# Patient Record
Sex: MALE | Race: WHITE | NOT HISPANIC OR LATINO | Employment: FULL TIME | URBAN - METROPOLITAN AREA
[De-identification: names, ages, dates, MRNs, and addresses within clinical notes are randomized per-mention and may not be internally consistent; named-entity substitution may affect disease eponyms.]

---

## 2020-07-28 PROCEDURE — U0003 INFECTIOUS AGENT DETECTION BY NUCLEIC ACID (DNA OR RNA); SEVERE ACUTE RESPIRATORY SYNDROME CORONAVIRUS 2 (SARS-COV-2) (CORONAVIRUS DISEASE [COVID-19]), AMPLIFIED PROBE TECHNIQUE, MAKING USE OF HIGH THROUGHPUT TECHNOLOGIES AS DESCRIBED BY CMS-2020-01-R: HCPCS | Performed by: PHYSICIAN ASSISTANT

## 2020-07-30 ENCOUNTER — TELEPHONE (OUTPATIENT)
Dept: URGENT CARE | Facility: CLINIC | Age: 50
End: 2020-07-30

## 2020-08-12 ENCOUNTER — OFFICE VISIT (OUTPATIENT)
Dept: INTERNAL MEDICINE | Facility: CLINIC | Age: 50
End: 2020-08-12

## 2020-08-12 VITALS
OXYGEN SATURATION: 98 % | BODY MASS INDEX: 28.92 KG/M2 | RESPIRATION RATE: 18 BRPM | WEIGHT: 202 LBS | HEIGHT: 70 IN | HEART RATE: 76 BPM | DIASTOLIC BLOOD PRESSURE: 98 MMHG | TEMPERATURE: 98 F | SYSTOLIC BLOOD PRESSURE: 170 MMHG

## 2020-08-12 DIAGNOSIS — M54.50 ACUTE RIGHT-SIDED LOW BACK PAIN WITHOUT SCIATICA: Primary | ICD-10-CM

## 2020-08-12 DIAGNOSIS — I10 ESSENTIAL HYPERTENSION: ICD-10-CM

## 2020-08-12 DIAGNOSIS — F40.298 NEEDLE PHOBIA: ICD-10-CM

## 2020-08-12 PROCEDURE — 99214 OFFICE O/P EST MOD 30 MIN: CPT | Performed by: NURSE PRACTITIONER

## 2020-08-12 RX ORDER — CYCLOBENZAPRINE HCL 5 MG
5 TABLET ORAL 3 TIMES DAILY PRN
Qty: 15 TABLET | Refills: 0 | Status: SHIPPED | OUTPATIENT
Start: 2020-08-12 | End: 2020-09-09

## 2020-08-12 RX ORDER — PREDNISONE 10 MG/1
10 TABLET ORAL DAILY
Qty: 5 TABLET | Refills: 0 | Status: SHIPPED | OUTPATIENT
Start: 2020-08-12 | End: 2020-09-09

## 2020-08-12 RX ORDER — LISINOPRIL 20 MG/1
20 TABLET ORAL DAILY
Qty: 30 TABLET | Refills: 1 | Status: SHIPPED | OUTPATIENT
Start: 2020-08-12 | End: 2020-10-02

## 2020-08-12 NOTE — PROGRESS NOTES
Subjective   Denny Hutchins is a 50 y.o. male here to establish care.  Chief Complaint   Patient presents with   • [facility] on 7/28   • Hypertension   • Back Pain     lower right back pain pain started this morning.  Had another episode last week       Hypertension   This is a new problem. The current episode started 1 to 4 weeks ago. The problem is unchanged. The problem is uncontrolled. Pertinent negatives include no anxiety, blurred vision, chest pain, headaches, malaise/fatigue, neck pain, orthopnea, palpitations, peripheral edema, PND, shortness of breath or sweats. Agents associated with hypertension include NSAIDs. Past treatments include nothing. Current antihypertension treatment includes nothing. The current treatment provides no improvement. Compliance problems include exercise and diet.  There is no history of angina, CAD/MI, CVA or heart failure. Identifiable causes of hypertension include a hypertension causing med.   Back Pain   This is a new problem. The current episode started today. The problem occurs constantly. The problem has been gradually worsening since onset. The pain is present in the lumbar spine. The quality of the pain is described as aching and cramping. The pain does not radiate. The pain is at a severity of 8/10. The pain is the same all the time. The symptoms are aggravated by bending, sitting and twisting. Stiffness is present all day. Pertinent negatives include no abdominal pain, bladder incontinence, bowel incontinence, chest pain, fever, headaches, leg pain, numbness, paresis, pelvic pain, perianal numbness, tingling, weakness or weight loss. Risk factors include lack of exercise and poor posture. He has tried NSAIDs for the symptoms. The treatment provided mild relief.      He has had no recent labs or work-up from primary care provider.  He is reluctant to have these done as he is afraid of needles.  He is not fasting today.  He has had no evaluation of his lipids in  the past.        The following portions of the patient's history were reviewed and updated as appropriate: allergies, current medications, past family history, past medical history, past social history, past surgical history and problem list.    Review of Systems   Constitutional: Negative for fatigue, fever, malaise/fatigue and unexpected weight loss.   HENT: Negative.    Eyes: Negative for blurred vision, pain and visual disturbance.   Respiratory: Negative for cough, chest tightness and shortness of breath.    Cardiovascular: Negative for chest pain, palpitations, orthopnea, leg swelling and PND.   Gastrointestinal: Negative for abdominal pain, bowel incontinence, constipation and diarrhea.   Genitourinary: Negative.  Negative for difficulty urinating, pelvic pain and urinary incontinence.   Musculoskeletal: Positive for back pain. Negative for arthralgias, myalgias and neck pain.   Skin: Negative for color change and rash.   Neurological: Negative for dizziness, tingling, weakness, light-headedness, numbness, headache and confusion.   Hematological: Does not bruise/bleed easily.           No Known Allergies  History reviewed. No pertinent past medical history.  Past Surgical History:   Procedure Laterality Date   • NO PAST SURGERIES       Family History   Problem Relation Age of Onset   • Diabetes Mother      Social History     Socioeconomic History   • Marital status: Single     Spouse name: Not on file   • Number of children: Not on file   • Years of education: Not on file   • Highest education level: Not on file   Occupational History   • Occupation: carpet installing   Social Needs   • Financial resource strain: Not hard at all   • Food insecurity:     Worry: Never true     Inability: Never true   • Transportation needs:     Medical: No     Non-medical: No   Tobacco Use   • Smoking status: Current Every Day Smoker     Packs/day: 1.00     Types: Cigarettes   • Smokeless tobacco: Never Used   Substance and  "Sexual Activity   • Alcohol use: Yes     Alcohol/week: 18.0 standard drinks     Types: 18 Cans of beer per week     Frequency: 4 or more times a week     Binge frequency: Weekly   • Drug use: Never   • Sexual activity: Yes   Lifestyle   • Physical activity:     Days per week: 4 days     Minutes per session: Not on file   • Stress: Only a little       There is no immunization history on file for this patient.    Current Outpatient Medications:   •  cyclobenzaprine (FLEXERIL) 5 MG tablet, Take 1 tablet by mouth 3 (Three) Times a Day As Needed for Muscle Spasms., Disp: 15 tablet, Rfl: 0  •  lisinopril (PRINIVIL,ZESTRIL) 20 MG tablet, Take 1 tablet by mouth Daily., Disp: 30 tablet, Rfl: 1  •  predniSONE (DELTASONE) 10 MG tablet, Take 1 tablet by mouth Daily., Disp: 5 tablet, Rfl: 0    Objective   Blood pressure 170/98, pulse 76, temperature 98 °F (36.7 °C), resp. rate 18, height 176.8 cm (69.6\"), weight 91.6 kg (202 lb), SpO2 98 %.  Physical Exam   Constitutional: He is oriented to person, place, and time. He appears well-developed and well-nourished. No distress.   HENT:   Head: Normocephalic and atraumatic.   Eyes: Pupils are equal, round, and reactive to light. Conjunctivae are normal.   Neck: Normal range of motion. No JVD present. No thyroid mass and no thyromegaly present.   Cardiovascular: Normal rate, regular rhythm, normal heart sounds and intact distal pulses.   No murmur heard.  Pulmonary/Chest: Effort normal and breath sounds normal. No respiratory distress. He exhibits no tenderness.   Abdominal: Soft. Normal appearance and bowel sounds are normal. He exhibits no distension. There is no tenderness.   Musculoskeletal: He exhibits no edema.        Lumbar back: He exhibits decreased range of motion, tenderness, pain and spasm. He exhibits no bony tenderness, no swelling, no edema, no deformity, no laceration and normal pulse.   Neurological: He is alert and oriented to person, place, and time. He has normal " strength and normal reflexes. No cranial nerve deficit or sensory deficit.   Skin: Skin is warm and dry. He is not diaphoretic. No erythema. No pallor.   Psychiatric: He has a normal mood and affect. His speech is normal and behavior is normal. Judgment and thought content normal. His affect is not angry and not inappropriate. Cognition and memory are normal. He does not exhibit a depressed mood. He is attentive.   Nursing note and vitals reviewed.      Assessment/Plan   Diagnoses and all orders for this visit:    1. Acute right-sided low back pain without sciatica (Primary)  -     predniSONE (DELTASONE) 10 MG tablet; Take 1 tablet by mouth Daily.  Dispense: 5 tablet; Refill: 0  -     cyclobenzaprine (FLEXERIL) 5 MG tablet; Take 1 tablet by mouth 3 (Three) Times a Day As Needed for Muscle Spasms.  Dispense: 15 tablet; Refill: 0  We will do a short course of prednisone for his low back pain.  Can also use Flexeril as needed.  Discussed adverse effects of both medications.  He may want reserve the Flexeril for nighttime use only have advised on no driving or operating heavy machinery while taking.  Encouraged gentle back stretches.  Could benefit from physical therapy if pain persists.  Advised to stop taking NSAIDs.  Can supplement with over-the-counter Tylenol if needed.    2. Essential hypertension  -     CBC (No Diff); Future  -     Comprehensive Metabolic Panel; Future  -     TSH Rfx On Abnormal To Free T4; Future  -     Lipid Panel; Future  -     lisinopril (PRINIVIL,ZESTRIL) 20 MG tablet; Take 1 tablet by mouth Daily.  Dispense: 30 tablet; Refill: 1    Significant elevation in blood pressure.  Patient is asymptomatic.  We will get him started on lisinopril.  Adverse effects and dosing discussed.  We will also check labs as above to evaluate for underlying causes.    3.  Needle phobia  Patient very reluctant to get his labs done as he reports fear of needles.  We will go ahead and order all of his labs to get  these done at the same time.  He will return to get these done 1 day next week when he is fasting.         Return in about 4 weeks (around 9/9/2020) for Recheck., needs labs and BP check next week. .  Plan of care discussed with pt. They verbalized understanding and agreement.       * Please note that portions of this note were completed with a voice recognition program. Efforts were made to edit the dictation but occasionally words are erroneously transcribed.

## 2020-08-17 PROBLEM — I10 ESSENTIAL HYPERTENSION: Status: ACTIVE | Noted: 2020-08-17

## 2020-08-17 PROBLEM — F40.298 NEEDLE PHOBIA: Status: ACTIVE | Noted: 2020-08-17

## 2020-08-17 PROBLEM — M54.50 ACUTE RIGHT-SIDED LOW BACK PAIN WITHOUT SCIATICA: Status: ACTIVE | Noted: 2020-08-17

## 2020-08-21 ENCOUNTER — LAB (OUTPATIENT)
Dept: LAB | Facility: HOSPITAL | Age: 50
End: 2020-08-21

## 2020-08-21 DIAGNOSIS — I10 ESSENTIAL HYPERTENSION: ICD-10-CM

## 2020-08-21 LAB
ALBUMIN SERPL-MCNC: 4.5 G/DL (ref 3.5–5.2)
ALBUMIN/GLOB SERPL: 1.6 G/DL
ALP SERPL-CCNC: 52 U/L (ref 39–117)
ALT SERPL W P-5'-P-CCNC: 29 U/L (ref 1–41)
ANION GAP SERPL CALCULATED.3IONS-SCNC: 12.6 MMOL/L (ref 5–15)
AST SERPL-CCNC: 21 U/L (ref 1–40)
BILIRUB SERPL-MCNC: 0.4 MG/DL (ref 0–1.2)
BUN SERPL-MCNC: 15 MG/DL (ref 6–20)
BUN/CREAT SERPL: 13.3 (ref 7–25)
CALCIUM SPEC-SCNC: 9.2 MG/DL (ref 8.6–10.5)
CHLORIDE SERPL-SCNC: 95 MMOL/L (ref 98–107)
CHOLEST SERPL-MCNC: 173 MG/DL (ref 0–200)
CO2 SERPL-SCNC: 23.4 MMOL/L (ref 22–29)
CREAT SERPL-MCNC: 1.13 MG/DL (ref 0.76–1.27)
DEPRECATED RDW RBC AUTO: 43.5 FL (ref 37–54)
ERYTHROCYTE [DISTWIDTH] IN BLOOD BY AUTOMATED COUNT: 11.9 % (ref 12.3–15.4)
GFR SERPL CREATININE-BSD FRML MDRD: 69 ML/MIN/1.73
GLOBULIN UR ELPH-MCNC: 2.8 GM/DL
GLUCOSE SERPL-MCNC: 94 MG/DL (ref 65–99)
HCT VFR BLD AUTO: 44.5 % (ref 37.5–51)
HDLC SERPL-MCNC: 49 MG/DL (ref 40–60)
HGB BLD-MCNC: 15.2 G/DL (ref 13–17.7)
LDLC SERPL CALC-MCNC: 107 MG/DL (ref 0–100)
LDLC/HDLC SERPL: 2.19 {RATIO}
MCH RBC QN AUTO: 33.4 PG (ref 26.6–33)
MCHC RBC AUTO-ENTMCNC: 34.2 G/DL (ref 31.5–35.7)
MCV RBC AUTO: 97.8 FL (ref 79–97)
PLATELET # BLD AUTO: 220 10*3/MM3 (ref 140–450)
PMV BLD AUTO: 9.3 FL (ref 6–12)
POTASSIUM SERPL-SCNC: 5.2 MMOL/L (ref 3.5–5.2)
PROT SERPL-MCNC: 7.3 G/DL (ref 6–8.5)
RBC # BLD AUTO: 4.55 10*6/MM3 (ref 4.14–5.8)
SODIUM SERPL-SCNC: 131 MMOL/L (ref 136–145)
TRIGL SERPL-MCNC: 84 MG/DL (ref 0–150)
TSH SERPL DL<=0.05 MIU/L-ACNC: 1.34 UIU/ML (ref 0.27–4.2)
VLDLC SERPL-MCNC: 16.8 MG/DL (ref 5–40)
WBC # BLD AUTO: 5.95 10*3/MM3 (ref 3.4–10.8)

## 2020-08-21 PROCEDURE — 80061 LIPID PANEL: CPT

## 2020-08-21 PROCEDURE — 85027 COMPLETE CBC AUTOMATED: CPT

## 2020-08-21 PROCEDURE — 80053 COMPREHEN METABOLIC PANEL: CPT

## 2020-08-21 PROCEDURE — 84443 ASSAY THYROID STIM HORMONE: CPT

## 2020-08-25 DIAGNOSIS — E87.1 HYPONATREMIA: Primary | ICD-10-CM

## 2020-08-26 ENCOUNTER — TELEPHONE (OUTPATIENT)
Dept: INTERNAL MEDICINE | Facility: CLINIC | Age: 50
End: 2020-08-26

## 2020-08-26 NOTE — TELEPHONE ENCOUNTER
----- Message from JW Pack sent at 8/25/2020  6:21 PM EDT -----  Please let him know labs showed a low sodium level. This eliot to be rechecked. Orders are in if he can stop by this week

## 2020-08-27 ENCOUNTER — LAB (OUTPATIENT)
Dept: LAB | Facility: HOSPITAL | Age: 50
End: 2020-08-27

## 2020-08-27 DIAGNOSIS — E87.1 HYPONATREMIA: ICD-10-CM

## 2020-08-27 LAB
ANION GAP SERPL CALCULATED.3IONS-SCNC: 9.4 MMOL/L (ref 5–15)
BUN SERPL-MCNC: 16 MG/DL (ref 6–20)
BUN/CREAT SERPL: 15.2 (ref 7–25)
CALCIUM SPEC-SCNC: 9.5 MG/DL (ref 8.6–10.5)
CHLORIDE SERPL-SCNC: 100 MMOL/L (ref 98–107)
CO2 SERPL-SCNC: 24.6 MMOL/L (ref 22–29)
CREAT SERPL-MCNC: 1.05 MG/DL (ref 0.76–1.27)
GFR SERPL CREATININE-BSD FRML MDRD: 75 ML/MIN/1.73
GLUCOSE SERPL-MCNC: 100 MG/DL (ref 65–99)
OSMOLALITY SERPL: 292 MOSM/KG (ref 275–300)
POTASSIUM SERPL-SCNC: 4.9 MMOL/L (ref 3.5–5.2)
SODIUM SERPL-SCNC: 134 MMOL/L (ref 136–145)

## 2020-08-27 PROCEDURE — 80048 BASIC METABOLIC PNL TOTAL CA: CPT

## 2020-08-27 PROCEDURE — 36415 COLL VENOUS BLD VENIPUNCTURE: CPT

## 2020-08-27 PROCEDURE — 83930 ASSAY OF BLOOD OSMOLALITY: CPT

## 2020-09-08 ENCOUNTER — TELEPHONE (OUTPATIENT)
Dept: INTERNAL MEDICINE | Facility: CLINIC | Age: 50
End: 2020-09-08

## 2020-09-08 NOTE — TELEPHONE ENCOUNTER
----- Message from JW Pack sent at 9/7/2020  3:46 PM EDT -----  Please let him know that his labs look better on the low sodium.  No interventions at this time

## 2020-09-09 ENCOUNTER — OFFICE VISIT (OUTPATIENT)
Dept: INTERNAL MEDICINE | Facility: CLINIC | Age: 50
End: 2020-09-09

## 2020-09-09 VITALS
RESPIRATION RATE: 16 BRPM | HEIGHT: 70 IN | HEART RATE: 79 BPM | DIASTOLIC BLOOD PRESSURE: 90 MMHG | SYSTOLIC BLOOD PRESSURE: 148 MMHG | BODY MASS INDEX: 28.77 KG/M2 | WEIGHT: 201 LBS | TEMPERATURE: 97.8 F | OXYGEN SATURATION: 97 %

## 2020-09-09 DIAGNOSIS — I10 ESSENTIAL HYPERTENSION: Primary | ICD-10-CM

## 2020-09-09 DIAGNOSIS — E87.1 HYPONATREMIA: ICD-10-CM

## 2020-09-09 DIAGNOSIS — Z91.14 NON COMPLIANCE W MEDICATION REGIMEN: ICD-10-CM

## 2020-09-09 DIAGNOSIS — M54.50 ACUTE RIGHT-SIDED LOW BACK PAIN WITHOUT SCIATICA: ICD-10-CM

## 2020-09-09 PROCEDURE — 99213 OFFICE O/P EST LOW 20 MIN: CPT | Performed by: NURSE PRACTITIONER

## 2020-09-09 RX ORDER — CYCLOBENZAPRINE HCL 5 MG
5 TABLET ORAL 3 TIMES DAILY PRN
Qty: 30 TABLET | Refills: 0 | Status: SHIPPED | OUTPATIENT
Start: 2020-09-09 | End: 2020-12-02

## 2020-09-09 NOTE — PROGRESS NOTES
Subjective   Denny Hutchins is a 50 y.o. male.     Chief Complaint   Patient presents with   • Hypertension   • Back Pain     much better       History of Present Illness     Hypertension-this is been a problem in the last month.  At his last visit on 8/12/2020 we started him on lisinopril.  Has not been checking BP at home. Has cuff. Has been forgetting to take his BP medication regularly. Did not take yet today.   Denies headaches, dizziness, visual disturbances, chest pain, dyspnea, TIA, leg pain/claudication symptoms, and edema.       Back pain-at his last visit he was experiencing some right-sided low back pain.  He was treated with prednisone and cyclobenzaprine and has had significant improvement in the pain. He used all of the flexeril and would like to have them on hand for PRN use since he tends to have intermittent back pain.     Hyponatremia noted on labs last visit.  Repeat sodium was better.   Lab Results   Component Value Date    GLUCOSE 100 (H) 08/27/2020    CALCIUM 9.5 08/27/2020     (L) 08/27/2020    K 4.9 08/27/2020    CO2 24.6 08/27/2020     08/27/2020    BUN 16 08/27/2020    CREATININE 1.05 08/27/2020    EGFRIFNONA 75 08/27/2020    BCR 15.2 08/27/2020    ANIONGAP 9.4 08/27/2020           The following portions of the patient's history were reviewed and updated as appropriate: allergies, current medications, past family history, past medical history, past social history, past surgical history and problem list.        Review of Systems   Constitutional: Negative for fatigue, fever and unexpected weight loss.   HENT: Negative.    Eyes: Negative for pain and visual disturbance.   Respiratory: Negative for cough, chest tightness and shortness of breath.    Cardiovascular: Negative for chest pain, palpitations and leg swelling.   Gastrointestinal: Negative for abdominal pain, constipation and diarrhea.   Genitourinary: Negative.  Negative for difficulty urinating.   Musculoskeletal: Positive  "for back pain. Negative for arthralgias and myalgias.   Skin: Negative for color change and rash.   Neurological: Negative for dizziness, weakness, light-headedness, headache and confusion.   Hematological: Does not bruise/bleed easily.           Outpatient Medications Marked as Taking for the 9/9/20 encounter (Office Visit) with Bere Dowling APRN   Medication Sig Dispense Refill   • lisinopril (PRINIVIL,ZESTRIL) 20 MG tablet Take 1 tablet by mouth Daily. 30 tablet 1     No Known Allergies        Objective   Physical Exam   Constitutional: He is oriented to person, place, and time. He appears well-developed and well-nourished. No distress.   HENT:   Head: Normocephalic and atraumatic.   Eyes: Pupils are equal, round, and reactive to light. Conjunctivae are normal.   Neck: Normal range of motion. No JVD present.   Cardiovascular: Normal rate, regular rhythm, normal heart sounds and intact distal pulses.   No murmur heard.  Pulmonary/Chest: Effort normal and breath sounds normal. No respiratory distress. He exhibits no tenderness.   Abdominal: Soft. Normal appearance and bowel sounds are normal. He exhibits no distension. There is no tenderness.   Musculoskeletal: He exhibits no edema.   Neurological: He is alert and oriented to person, place, and time.   Skin: Skin is warm and dry. He is not diaphoretic. No erythema. No pallor.   Psychiatric: He has a normal mood and affect. His speech is normal and behavior is normal. Judgment and thought content normal.   Nursing note and vitals reviewed.      Vitals:    09/09/20 0802   BP: 148/90   Pulse: 79   Resp: 16   Temp: 97.8 °F (36.6 °C)   SpO2: 97%   Weight: 91.2 kg (201 lb)   Height: 176.8 cm (69.6\")     Body mass index is 29.17 kg/m².  Wt Readings from Last 3 Encounters:   09/09/20 91.2 kg (201 lb)   08/12/20 91.6 kg (202 lb)   10/21/19 88.5 kg (195 lb)             Assessment/Plan       Diagnoses and all orders for this visit:    1. Essential hypertension " (Primary)  BP still up, he has not had meds yet. He will go straight home and take the lisinopril.   Patient to monitor BP at home, instructed on goal BP <130/80, patient will let me know if not meeting goal.   Nurse BP check in 1-2 weeks and can adjust lisinopril at that time if needed, bring log of BP with him    2. Acute right-sided low back pain without sciatica  Comments:  better  Orders:  -     cyclobenzaprine (FLEXERIL) 5 MG tablet; Take 1 tablet by mouth 3 (Three) Times a Day As Needed for Muscle Spasms.  Dispense: 30 tablet; Refill: 0    3. Hyponatremia  Comments:  improved on recheck       4. Non compliance w medication regimen  Comments:  having trouble remembering to take lisinopril every day  Encouraged to put bottle near a daily activity such as brushing teeth or coffee pot or set an alert/reminder on smart phone         Return in about 3 months (around 12/9/2020) for Annual, and in 2 weeks for nurse BP check.    I discussed my findings,recommendations, and plan of care was with the patient. They verbalized understanding and agreement.  Patient was encouraged to keep me informed of any acute changes, lack of improvement, or any new concerning symptoms.       * Please note that portions of this note were completed with a voice recognition program. Efforts were made to edit the dictation but occasionally words are erroneously transcribed.

## 2020-10-02 DIAGNOSIS — I10 ESSENTIAL HYPERTENSION: ICD-10-CM

## 2020-10-02 RX ORDER — LISINOPRIL 20 MG/1
TABLET ORAL
Qty: 30 TABLET | Refills: 2 | Status: SHIPPED | OUTPATIENT
Start: 2020-10-02 | End: 2020-12-02

## 2020-10-02 NOTE — TELEPHONE ENCOUNTER
Refills sent, Please remind him to stop in for the nurse BP check or at least let us know recent  BP readings on the med

## 2020-12-02 ENCOUNTER — OFFICE VISIT (OUTPATIENT)
Dept: INTERNAL MEDICINE | Facility: CLINIC | Age: 50
End: 2020-12-02

## 2020-12-02 ENCOUNTER — RESULTS ENCOUNTER (OUTPATIENT)
Dept: INTERNAL MEDICINE | Facility: CLINIC | Age: 50
End: 2020-12-02

## 2020-12-02 VITALS
DIASTOLIC BLOOD PRESSURE: 88 MMHG | HEART RATE: 84 BPM | OXYGEN SATURATION: 99 % | TEMPERATURE: 96.8 F | BODY MASS INDEX: 29.92 KG/M2 | HEIGHT: 69 IN | SYSTOLIC BLOOD PRESSURE: 140 MMHG | RESPIRATION RATE: 18 BRPM | WEIGHT: 202 LBS

## 2020-12-02 DIAGNOSIS — Z12.11 COLON CANCER SCREENING: ICD-10-CM

## 2020-12-02 DIAGNOSIS — Z28.21 INFLUENZA VACCINATION DECLINED: ICD-10-CM

## 2020-12-02 DIAGNOSIS — I10 ESSENTIAL HYPERTENSION: ICD-10-CM

## 2020-12-02 DIAGNOSIS — Z72.0 TOBACCO ABUSE: ICD-10-CM

## 2020-12-02 DIAGNOSIS — Z78.9 ALCOHOL USE: ICD-10-CM

## 2020-12-02 DIAGNOSIS — Z11.59 ENCOUNTER FOR HEPATITIS C SCREENING TEST FOR LOW RISK PATIENT: ICD-10-CM

## 2020-12-02 DIAGNOSIS — Z13.31 NEGATIVE DEPRESSION SCREENING: ICD-10-CM

## 2020-12-02 DIAGNOSIS — Z23 NEED FOR PNEUMOCOCCAL VACCINATION: ICD-10-CM

## 2020-12-02 DIAGNOSIS — Z00.00 ANNUAL PHYSICAL EXAM: Primary | ICD-10-CM

## 2020-12-02 PROCEDURE — 90732 PPSV23 VACC 2 YRS+ SUBQ/IM: CPT | Performed by: NURSE PRACTITIONER

## 2020-12-02 PROCEDURE — 99396 PREV VISIT EST AGE 40-64: CPT | Performed by: NURSE PRACTITIONER

## 2020-12-02 PROCEDURE — 90471 IMMUNIZATION ADMIN: CPT | Performed by: NURSE PRACTITIONER

## 2020-12-02 RX ORDER — LISINOPRIL 40 MG/1
40 TABLET ORAL DAILY
Qty: 30 TABLET | Refills: 3 | Status: SHIPPED | OUTPATIENT
Start: 2020-12-02 | End: 2021-03-08 | Stop reason: DRUGHIGH

## 2020-12-02 NOTE — PROGRESS NOTES
Patient Care Team:  Bere Dowling APRN as PCP - General (Nurse Practitioner)  Bere Dowling APRN as Nurse Practitioner (Nurse Practitioner)     Chief complaint: Patient is in today for a physical          Patient is a 50 y.o. male who presents for his yearly physical exam.     HPI      Hypertension-chronic.  Uncontrolled.  He is currently lisinopril 20 mg.  Reports he is taking it most the time but he did forget yesterday.  He follows unhealthy diet and does not exercise routinely.  He does drink about 18 beers a  week,sometimes on holidays he will have more.  Does not feel as though he has an alcohol problem.  CAGE Questionnaire:  Have you ever felt you should Cut down on your drinking?  No   Have people Annoyed you by criticizing your drinking?  No  Have you ever felt bad or Guilty about your drinking?  No  Have you ever had a drink first thing in the morning to steady your nerves or to get rid of a hangover (Eye opener)?  No  SCORE 0/4    Health maintenance/lifestyle:  Immunization History   Administered Date(s) Administered   • Pneumococcal Polysaccharide (PPSV23) 12/02/2020       Pneumococcal: Due, will update today  Influenza: Refused  Tdap: Unknown, he refused  Hep A: Declined  Hep C screening: Never    Colon cancer screening: He has no history of colon cancer, no familial history of colon cancer, denies any melena, hematochezia, or changes in bowel pattern or consistency.  He would like to do Cologuard.    *    Diet: eats out rarely, limits junk foods, not much fruits and veggies.   Exercise: Rare    Social History     Tobacco Use   Smoking Status Current Every Day Smoker   • Packs/day: 1.00   • Types: Cigarettes   Smokeless Tobacco Never Used     Social History     Substance and Sexual Activity   Alcohol Use Yes   • Alcohol/week: 18.0 standard drinks   • Types: 18 Cans of beer per week   • Frequency: 4 or more times a week   • Binge frequency: Weekly       PHQ-2 Depression Screening  Little  interest or pleasure in doing things? 0   Feeling down, depressed, or hopeless? 0   PHQ-2 Total Score 0         Review of Systems   Constitutional: Negative for activity change, appetite change, chills, diaphoresis, fatigue, fever, unexpected weight gain and unexpected weight loss.   HENT: Negative for voice change.    Eyes: Negative for blurred vision, double vision, pain and visual disturbance.   Respiratory: Negative for cough, chest tightness, shortness of breath and wheezing.    Cardiovascular: Negative for chest pain, palpitations and leg swelling.   Gastrointestinal: Negative for abdominal distention, abdominal pain, constipation, diarrhea, nausea and vomiting.   Endocrine: Negative for cold intolerance, heat intolerance, polydipsia, polyphagia and polyuria.   Genitourinary: Negative for difficulty urinating, frequency and urgency.   Musculoskeletal: Positive for arthralgias. Negative for gait problem and myalgias.   Skin: Negative for color change, dry skin and rash.   Neurological: Negative for dizziness, facial asymmetry, weakness, light-headedness, numbness, headache and confusion.   Hematological: Negative for adenopathy. Does not bruise/bleed easily.   Psychiatric/Behavioral: Negative for decreased concentration and sleep disturbance. The patient is not nervous/anxious.    All other systems reviewed and are negative.        History  History reviewed. No pertinent past medical history.   Past Surgical History:   Procedure Laterality Date   • NO PAST SURGERIES        No Known Allergies   Family History   Problem Relation Age of Onset   • Diabetes Mother      Social History     Socioeconomic History   • Marital status: Single     Spouse name: Not on file   • Number of children: Not on file   • Years of education: Not on file   • Highest education level: Not on file   Occupational History   • Occupation: carpet installing   Social Needs   • Financial resource strain: Not hard at all   • Food insecurity      "Worry: Never true     Inability: Never true   • Transportation needs     Medical: No     Non-medical: No   Tobacco Use   • Smoking status: Current Every Day Smoker     Packs/day: 1.00     Types: Cigarettes   • Smokeless tobacco: Never Used   Substance and Sexual Activity   • Alcohol use: Yes     Alcohol/week: 18.0 standard drinks     Types: 18 Cans of beer per week     Frequency: 4 or more times a week     Binge frequency: Weekly   • Drug use: Never   • Sexual activity: Yes   Lifestyle   • Physical activity     Days per week: 4 days     Minutes per session: Not on file   • Stress: Only a little        Current Outpatient Medications:   •  lisinopril (PRINIVIL,ZESTRIL) 40 MG tablet, Take 1 tablet by mouth Daily., Disp: 30 tablet, Rfl: 3                /88   Pulse 84   Temp 96.8 °F (36 °C)   Resp 18   Ht 175.8 cm (69.2\")   Wt 91.6 kg (202 lb)   SpO2 99%   BMI 29.66 kg/m²       Physical Exam  Vitals signs and nursing note reviewed.   Constitutional:       General: He is not in acute distress.     Appearance: Normal appearance. He is well-developed. He is not diaphoretic.   HENT:      Head: Normocephalic and atraumatic.      Right Ear: External ear normal.      Left Ear: External ear normal.      Nose: Nose normal.   Eyes:      General: No scleral icterus.        Right eye: No discharge.         Left eye: No discharge.      Conjunctiva/sclera: Conjunctivae normal.      Pupils: Pupils are equal, round, and reactive to light.   Neck:      Musculoskeletal: Normal range of motion and neck supple.      Thyroid: No thyroid mass, thyromegaly or thyroid tenderness.      Vascular: Normal carotid pulses. No carotid bruit or JVD.      Trachea: No tracheal deviation.   Cardiovascular:      Rate and Rhythm: Normal rate and regular rhythm.      Heart sounds: No murmur. No friction rub. No gallop.    Pulmonary:      Effort: Pulmonary effort is normal. No respiratory distress.      Breath sounds: Normal breath sounds. No " wheezing or rales.   Chest:      Chest wall: No tenderness.   Abdominal:      General: Bowel sounds are normal. There is no distension.      Palpations: Abdomen is soft. There is no mass.      Tenderness: There is no abdominal tenderness. There is no guarding or rebound.      Hernia: No hernia is present.   Genitourinary:     Comments: deferred  Musculoskeletal: Normal range of motion.         General: No tenderness or deformity.   Lymphadenopathy:      Cervical: No cervical adenopathy.   Skin:     General: Skin is warm and dry.      Coloration: Skin is not pale.      Findings: No erythema or rash.   Neurological:      Mental Status: He is alert and oriented to person, place, and time.      Motor: No abnormal muscle tone.      Deep Tendon Reflexes: Reflexes are normal and symmetric. Reflexes normal.   Psychiatric:         Behavior: Behavior normal.         Thought Content: Thought content normal.         Judgment: Judgment normal.                   Diagnoses and all orders for this visit:    1. Annual physical exam (Primary)    2. Essential hypertension  -     lisinopril (PRINIVIL,ZESTRIL) 40 MG tablet; Take 1 tablet by mouth Daily.  Dispense: 30 tablet; Refill: 3    3. Influenza vaccination declined    4. Negative depression screening    5. Tobacco abuse    6. Alcohol use    7. Colon cancer screening  -     Cologuard - Stool, Per Rectum; Future    8. Need for pneumococcal vaccination  -     Pneumococcal Polysaccharide Vaccine 23-Valent Greater Than or Equal To 1yo Subcutaneous / IM    9. Encounter for hepatitis C screening test for low risk patient  -     HCV Antibody Rfx To Qnt PCR; Future    Blood pressure is uncontrolled.  We will increase his lisinopril to 40 mg daily.  Patient to monitor BP at home, instructed on goal BP <130/80, patient will let me know if not meeting goal.   Encouraged low-sodium diet.    Encouraged to decrease alcohol consumption      Patient has had labs in August.  He declines any  additional labs today.  He is due for hepatitis C screening.  I will order this so that we can collect with his next set of labs.  Immunizations and screenings he declines flu vaccination and tetanus vaccination.  Willing to do pneumococcal vaccination.  Cologuard ordered.  Counseling: The patient is advised to quit smoking but he is not ready to do so at this time, begin progressive daily aerobic exercise program Follow up: Return in about 3 months (around 3/2/2021) for chronic condition follow up.  Plan of care discussed with pt. They verbalized understanding and agreement.     Bere Dowling, JW   12/2/2020   09:00 EST              * Please note that portions of this note were completed with a voice recognition program. Efforts were made to edit the dictation but occasionally words are erroneously transcribed.

## 2021-03-08 ENCOUNTER — OFFICE VISIT (OUTPATIENT)
Dept: INTERNAL MEDICINE | Facility: CLINIC | Age: 51
End: 2021-03-08

## 2021-03-08 VITALS
RESPIRATION RATE: 18 BRPM | TEMPERATURE: 97.5 F | HEART RATE: 65 BPM | HEIGHT: 69 IN | OXYGEN SATURATION: 97 % | BODY MASS INDEX: 30.66 KG/M2 | DIASTOLIC BLOOD PRESSURE: 74 MMHG | WEIGHT: 207 LBS | SYSTOLIC BLOOD PRESSURE: 122 MMHG

## 2021-03-08 DIAGNOSIS — I48.91 NEW ONSET A-FIB (HCC): ICD-10-CM

## 2021-03-08 DIAGNOSIS — I21.4 NSTEMI (NON-ST ELEVATED MYOCARDIAL INFARCTION) (HCC): ICD-10-CM

## 2021-03-08 DIAGNOSIS — Z72.0 TOBACCO ABUSE: ICD-10-CM

## 2021-03-08 DIAGNOSIS — I10 ESSENTIAL HYPERTENSION: Primary | Chronic | ICD-10-CM

## 2021-03-08 DIAGNOSIS — Z09 HOSPITAL DISCHARGE FOLLOW-UP: ICD-10-CM

## 2021-03-08 PROBLEM — R07.9 CHEST PAIN: Status: ACTIVE | Noted: 2021-02-25

## 2021-03-08 PROBLEM — R07.9 CHEST PAIN: Status: RESOLVED | Noted: 2021-02-25 | Resolved: 2021-03-08

## 2021-03-08 PROBLEM — Z91.89 AT RISK OF DISEASE: Status: ACTIVE | Noted: 2021-03-08

## 2021-03-08 PROCEDURE — 99214 OFFICE O/P EST MOD 30 MIN: CPT | Performed by: NURSE PRACTITIONER

## 2021-03-08 RX ORDER — TICAGRELOR 90 MG/1
1 TABLET ORAL 2 TIMES DAILY
COMMUNITY
Start: 2021-02-25 | End: 2021-05-07 | Stop reason: SDUPTHER

## 2021-03-08 RX ORDER — LISINOPRIL 20 MG/1
1 TABLET ORAL DAILY
COMMUNITY
Start: 2021-02-25 | End: 2021-03-29 | Stop reason: SDUPTHER

## 2021-03-08 RX ORDER — NICOTINE 21 MG/24HR
PATCH, TRANSDERMAL 24 HOURS TRANSDERMAL
COMMUNITY
Start: 2021-02-26 | End: 2021-05-07

## 2021-03-08 RX ORDER — METOPROLOL TARTRATE 50 MG/1
25 TABLET, FILM COATED ORAL 2 TIMES DAILY
COMMUNITY
Start: 2021-02-25 | End: 2021-03-29 | Stop reason: SDUPTHER

## 2021-03-08 RX ORDER — AMIODARONE HYDROCHLORIDE 200 MG/1
1 TABLET ORAL DAILY
COMMUNITY
Start: 2021-02-25 | End: 2021-04-05 | Stop reason: ALTCHOICE

## 2021-03-08 RX ORDER — APIXABAN 5 MG/1
1 TABLET, FILM COATED ORAL 2 TIMES DAILY
COMMUNITY
Start: 2021-02-25 | End: 2021-04-05 | Stop reason: ALTCHOICE

## 2021-03-08 RX ORDER — ATORVASTATIN CALCIUM 80 MG/1
80 TABLET, FILM COATED ORAL DAILY
COMMUNITY
Start: 2021-02-25 | End: 2021-03-29 | Stop reason: SDUPTHER

## 2021-03-08 NOTE — ASSESSMENT & PLAN NOTE
Coronary artery disease is newly identified.  Continue current treatment regimen.  Dietary sodium restriction.  Weight loss.  Regular aerobic exercise.  Stop smoking.  Refer to cardiology as Dr. Michaels does not take his insurance  Cardiac status will be reassessed in 1 month.

## 2021-03-08 NOTE — PROGRESS NOTES
"Chief Complaint  heart attack ( hospital follow)    Subjective     {CC  Problem List  Visit Diagnosis   Encounters  Notes  Medications  Labs  Result Review Imaging  Media :23}     Denny Hutchins presents to [facility] for   History of Present Illness    Pt here for hospital follow up. He was in [facility]   2/22/21-2/25/2021 for MI. He reports he was working and he started having pain/cramping in his neck and jaw with some nausea.   He never had any CP.  He actually went to the emergency department as he thought he might have food poisoning.    He was noted to have elevated troponin and was transferred over to [facility].  EKG showed no acute changes, troponin went from 1.4 up to 10.  D-dimer was negative. Was started on heparin drip and loaded on aspirin and Brilinta.  He was on IV nitroglycerin   he did have LHC with drug eluting stent placed.      Cardiology there recommend amiodarone, aspirin, Brilinta, and Eliquis as he also had new onset atrial fibrillation during his hospitalization.  He was started on Lopressor as well.  His echo showed mild left ventricular hypertrophy with an estimated EF of 50%  He has known HTN. Is on lisinopril. Reports they decreased the dose of lisinopril to 20 mg while he was there. He does not recall what medications he is on otherwise.      He has appt to see cardiology 3/25/2021 with Dr. Michaels.  Unfortunately they do not accept his insurance so he will need to arrange continued follow-up with a different cardiologist.    Since being back home her feels \"great.\"   no CP, arm pain, neck pain, nausea, palpations, dyspnea.   He is back to work and doing okay with that.    Current outpatient and discharge medications have been reconciled for the patient.  Reviewed by: JW Pack    Health Maintenance:   Got covid vaccine 3/6/2021     Review of Systems - General ROS: negative for - chills, fatigue, fever, hot flashes, malaise, night " sweats or sleep disturbance  Respiratory ROS: no cough, shortness of breath, or wheezing  Cardiovascular ROS: no chest pain or dyspnea on exertion  Neurological ROS: no TIA or stroke symptoms  negative      No Known Allergies  Current Outpatient Medications on File Prior to Visit   Medication Sig Dispense Refill   • amiodarone (PACERONE) 200 MG tablet Take 1 tablet by mouth Daily.     • atorvastatin (LIPITOR) 80 MG tablet Take 80 mg by mouth Daily.     • Brilinta 90 MG tablet tablet Take 1 tablet by mouth 2 (two) times a day.     • Eliquis 5 MG tablet tablet Take 1 tablet by mouth 2 (two) times a day.     • lisinopril (PRINIVIL,ZESTRIL) 20 MG tablet Take 1 tablet by mouth Daily.     • metoprolol tartrate (LOPRESSOR) 50 MG tablet Take 25 mg by mouth 2 (two) times a day.     • nicotine (NICODERM CQ) 21 MG/24HR patch APPLY ONE PATCH TOPICALLY EVERY 24 HOURS FOR 21 DAYS     • [DISCONTINUED] lisinopril (PRINIVIL,ZESTRIL) 40 MG tablet Take 1 tablet by mouth Daily. 30 tablet 3     No current facility-administered medications on file prior to visit.     History reviewed. No pertinent past medical history.   Past Surgical History:   Procedure Laterality Date   • NO PAST SURGERIES       Social History     Socioeconomic History   • Marital status: Single     Spouse name: Not on file   • Number of children: Not on file   • Years of education: Not on file   • Highest education level: Not on file   Tobacco Use   • Smoking status: Current Every Day Smoker     Packs/day: 0.50     Types: Cigarettes   • Smokeless tobacco: Never Used   Substance and Sexual Activity   • Alcohol use: Yes     Alcohol/week: 18.0 standard drinks     Types: 18 Cans of beer per week   • Drug use: Never   • Sexual activity: Yes     Family History   Problem Relation Age of Onset   • Diabetes Mother          The following portions of the patient's history were reviewed and updated as appropriate: allergies, current medications, past family history, past medical  "history, past social history, past surgical history and problem list and are available for review within electronic record    Objective     Vital Signs:   /74   Pulse 65   Temp 97.5 °F (36.4 °C)   Resp 18   Ht 175.8 cm (69.2\")   Wt 93.9 kg (207 lb)   SpO2 97%   BMI 30.39 kg/m²         Physical Exam  Vitals and nursing note reviewed.   Constitutional:       General: He is not in acute distress.     Appearance: Normal appearance. He is well-developed. He is not diaphoretic.   HENT:      Head: Normocephalic and atraumatic.   Eyes:      Conjunctiva/sclera: Conjunctivae normal.      Pupils: Pupils are equal, round, and reactive to light.   Neck:      Vascular: No JVD.   Cardiovascular:      Rate and Rhythm: Normal rate and regular rhythm.      Chest Wall: PMI is not displaced. No thrill.      Pulses: Normal pulses.      Heart sounds: Normal heart sounds. No murmur.   Pulmonary:      Effort: Pulmonary effort is normal. No respiratory distress.      Breath sounds: Normal breath sounds.   Chest:      Chest wall: No tenderness.   Abdominal:      General: Bowel sounds are normal. There is no distension.      Palpations: Abdomen is soft.      Tenderness: There is no abdominal tenderness.   Musculoskeletal:      Cervical back: Normal range of motion.      Right lower leg: No edema.      Left lower leg: No edema.   Skin:     General: Skin is warm and dry.      Coloration: Skin is not pale.      Findings: No erythema.   Neurological:      Mental Status: He is alert and oriented to person, place, and time.   Psychiatric:         Speech: Speech normal.         Behavior: Behavior normal.         Thought Content: Thought content normal.         Judgment: Judgment normal.          Result Review :     The following data was reviewed by: JW Roberson on 03/08/2021:      Data reviewed: Cardiology studies echo report form [facility] and Recent hospitalization notes 2/22-2/25/2021              Assessment and Plan  "     Diagnoses and all orders for this visit:    1. Essential hypertension (Primary)  Assessment & Plan:  Hypertension is improving with treatment.  Continue current treatment regimen.  Weight loss.  Regular aerobic exercise.  Stop smoking.  Continue current medications.  Blood pressure will be reassessed in 4 weeks.    Orders:  -     Ambulatory Referral to Cardiology    2. NSTEMI (non-ST elevated myocardial infarction) (CMS/[facility])  Assessment & Plan:  Coronary artery disease is newly identified.  Continue current treatment regimen.  Dietary sodium restriction.  Weight loss.  Regular aerobic exercise.  Stop smoking.  Refer to cardiology as Dr. Michaels does not take his insurance  Cardiac status will be reassessed in 1 month.    Orders:  -     Ambulatory Referral to Cardiology    3. New onset a-fib (CMS/[facility])  Assessment & Plan:  Regular rate and rhythm noted in office to auscultation.  Agree with current medications.  Will refer to cardiology for routine follow-up as well since Dr. Michaels does not take his insurance    Orders:  -     Ambulatory Referral to Cardiology    4. Tobacco abuse  Assessment & Plan:  Encourage continued cessation efforts.  He will continue the NicoDerm 21 mg until he sees me again in 1 month      5. Hospital discharge follow-up            Follow Up     Patient was given instructions and counseling regarding his condition or for health maintenance advice. Please see specific information pulled into the AVS if appropriate.   Any new medication's adverse effects have been discussed in detail with patient  Patient was encouraged to keep me informed of any acute changes, lack of improvement, or any new concerning symptoms.    Return in about 4 weeks (around 4/5/2021) for Recheck.    * Please note that portions of this note were completed with a voice recognition program. Efforts were made to edit the dictation but occasionally words are erroneously transcribed.

## 2021-03-08 NOTE — ASSESSMENT & PLAN NOTE
Regular rate and rhythm noted in office to auscultation.  Agree with current medications.  Will refer to cardiology for routine follow-up as well since Dr. Michaels does not take his insurance

## 2021-03-08 NOTE — ASSESSMENT & PLAN NOTE
Encourage continued cessation efforts.  He will continue the NicoDerm 21 mg until he sees me again in 1 month

## 2021-03-08 NOTE — ASSESSMENT & PLAN NOTE
Hypertension is improving with treatment.  Continue current treatment regimen.  Weight loss.  Regular aerobic exercise.  Stop smoking.  Continue current medications.  Blood pressure will be reassessed in 4 weeks.

## 2021-03-29 ENCOUNTER — TELEPHONE (OUTPATIENT)
Dept: INTERNAL MEDICINE | Facility: CLINIC | Age: 51
End: 2021-03-29

## 2021-03-29 RX ORDER — METOPROLOL TARTRATE 50 MG/1
25 TABLET, FILM COATED ORAL 2 TIMES DAILY
Qty: 30 TABLET | Refills: 3 | Status: SHIPPED | OUTPATIENT
Start: 2021-03-29 | End: 2021-07-12 | Stop reason: SDUPTHER

## 2021-03-29 RX ORDER — LISINOPRIL 20 MG/1
20 TABLET ORAL DAILY
Qty: 30 TABLET | Refills: 3 | Status: SHIPPED | OUTPATIENT
Start: 2021-03-29 | End: 2021-07-12 | Stop reason: SDUPTHER

## 2021-03-29 RX ORDER — ATORVASTATIN CALCIUM 80 MG/1
80 TABLET, FILM COATED ORAL DAILY
Qty: 30 TABLET | Refills: 3 | Status: SHIPPED | OUTPATIENT
Start: 2021-03-29 | End: 2021-07-12 | Stop reason: SDUPTHER

## 2021-03-29 NOTE — TELEPHONE ENCOUNTER
Caller: Denny Hutchins    Relationship: Self    Best call back number: [phone]    Medication needed:   Requested Prescriptions     Pending Prescriptions Disp Refills   • lisinopril (PRINIVIL,ZESTRIL) 20 MG tablet       Sig: Take 1 tablet by mouth Daily.   • metoprolol tartrate (LOPRESSOR) 50 MG tablet       Sig: Take 0.5 tablets by mouth 2 (two) times a day.   • atorvastatin (LIPITOR) 80 MG tablet       Sig: Take 1 tablet by mouth Daily.       When do you need the refill by: 3/29/21    Does the patient have less than a 3 day supply:  [x] Yes  [] No    What is the patient's preferred pharmacy: [id number] - [facility] [address] [phone] PH - [phone]      ADDITIONAL REQUEST: PATIENT REQUESTED A PRESCRIPTION FOR ASPIRIN 81 MG TABLETS, PATIENT STATED HE TAKES 1 DAILY

## 2021-03-29 NOTE — TELEPHONE ENCOUNTER
Last appt: 3/8/2021  Next appt: 4/5/2021  Last refill:    Atorvastatin - 2/25/2021   Metoprolol Tatrate - 2/25/2021   Lisinopril - 2/25/2021     I do not believe you initially fill these medications. They were brought to you from a patient.

## 2021-04-05 ENCOUNTER — OFFICE VISIT (OUTPATIENT)
Dept: INTERNAL MEDICINE | Facility: CLINIC | Age: 51
End: 2021-04-05

## 2021-04-05 VITALS
DIASTOLIC BLOOD PRESSURE: 74 MMHG | TEMPERATURE: 97.1 F | HEIGHT: 69 IN | BODY MASS INDEX: 30.07 KG/M2 | WEIGHT: 203 LBS | SYSTOLIC BLOOD PRESSURE: 124 MMHG | OXYGEN SATURATION: 98 % | RESPIRATION RATE: 18 BRPM | HEART RATE: 69 BPM

## 2021-04-05 DIAGNOSIS — I21.4 NSTEMI (NON-ST ELEVATED MYOCARDIAL INFARCTION) (HCC): Chronic | ICD-10-CM

## 2021-04-05 DIAGNOSIS — Z12.11 COLON CANCER SCREENING: ICD-10-CM

## 2021-04-05 DIAGNOSIS — I48.91 NEW ONSET A-FIB (HCC): Chronic | ICD-10-CM

## 2021-04-05 DIAGNOSIS — I10 ESSENTIAL HYPERTENSION: Primary | Chronic | ICD-10-CM

## 2021-04-05 DIAGNOSIS — Z72.0 TOBACCO ABUSE: Chronic | ICD-10-CM

## 2021-04-05 PROBLEM — E78.5 HYPERLIPIDEMIA: Status: ACTIVE | Noted: 2021-03-24

## 2021-04-05 PROBLEM — I48.0 PAROXYSMAL ATRIAL FIBRILLATION (HCC): Status: ACTIVE | Noted: 2021-03-08

## 2021-04-05 PROBLEM — Z98.61 HISTORY OF PERCUTANEOUS CORONARY INTERVENTION: Status: ACTIVE | Noted: 2021-02-24

## 2021-04-05 PROBLEM — E78.5 HYPERLIPIDEMIA: Chronic | Status: ACTIVE | Noted: 2021-03-24

## 2021-04-05 PROBLEM — I48.0 PAROXYSMAL ATRIAL FIBRILLATION: Chronic | Status: ACTIVE | Noted: 2021-03-08

## 2021-04-05 PROCEDURE — 99406 BEHAV CHNG SMOKING 3-10 MIN: CPT | Performed by: NURSE PRACTITIONER

## 2021-04-05 PROCEDURE — 99214 OFFICE O/P EST MOD 30 MIN: CPT | Performed by: NURSE PRACTITIONER

## 2021-04-05 RX ORDER — ASPIRIN 81 MG/1
TABLET ORAL
COMMUNITY

## 2021-04-05 NOTE — ASSESSMENT & PLAN NOTE
Coronary artery disease is stable.  He should continue current treatment regimen.  Encouraged DASH diet and regular physical activity.  Will refer to cardiology since Dr. Michaels does not take his insurance.  Will reassess in 3 months.

## 2021-04-05 NOTE — ASSESSMENT & PLAN NOTE
Regular rate and rhythm noted in office to auscultation.  Agree with current medications.  Will refer to cardiology

## 2021-04-05 NOTE — ASSESSMENT & PLAN NOTE
Lipid abnormalities are unchanged.  Nutritional counseling was provided. and Pharmacotherapy as ordered.  Continue high-dose statin.  We will recheck lipids at follow-up  Lipids will be reassessed in 3 months.

## 2021-04-05 NOTE — PROGRESS NOTES
Chief Complaint  Hypertension and Nicotine Dependence (discuss script for nicotine gum)    Subjective          Denny Hutchins presents to [facility] for   History of Present Illness     Denny Hutchins is a 50 y.o. male who is here today to follow-up on hypertension, hyperlipidemia,  and MI.  He was noted to have an elevated troponin but no EKG changes in February 2021.  He was evaluated at [facility].  He did have a left heart cath with a drug-eluting stent placed.  He is followed by cardiology.  They recommend Lopressor, Amiodarone, aspirin, Brilinta, and Eliquis as he also had a new onset of atrial fibrillation during his hospitalization.  He has seen Dr. Michaels since discharge.  Dr. Michaels DC'd his amiodarone and his Eliquis.  At his last visit I referred him to cardiology since Dr. Michaels does not take his insurance.  This referral has been closed by the scheduling team and indicates that the patient refused.  Patient reports that he has not refused this referral and would like to see cardiology.  Denies headaches, dizziness, visual disturbances, palpitations chest pain, dyspnea, TIA or CVA symptoms, leg pain/claudication symptoms, and edema.   He has been off of his metoprolol for about a week. He has refills at his pharmacy already     Tobacco abuse-at his last visit he was on NicoDerm 21 mg.he does not feel like this has helped.   He is smoking 0.5ppd.   He would like the nicotine gum  Social History     Tobacco Use   Smoking Status Current Every Day Smoker   • Packs/day: 0.50   • Types: Cigarettes   Smokeless Tobacco Never Used        Health Maintenance:   boyd ordered 12/3/2020-he reports that no one contacted him regarding this.  He is willing to complete this  HCV screening ordered 12/3/2020- he has not completed yet. Has a fear of needles and will collect at next lab draw.     No Known Allergies  Current Outpatient Medications on File Prior to Visit   Medication Sig  "Dispense Refill   • atorvastatin (LIPITOR) 80 MG tablet Take 1 tablet by mouth Daily. 30 tablet 3   • Brilinta 90 MG tablet tablet Take 1 tablet by mouth 2 (two) times a day.     • lisinopril (PRINIVIL,ZESTRIL) 20 MG tablet Take 1 tablet by mouth Daily. 30 tablet 3   • metoprolol tartrate (LOPRESSOR) 50 MG tablet Take 0.5 tablets by mouth 2 (two) times a day. 30 tablet 3   • nicotine (NICODERM CQ) 21 MG/24HR patch APPLY ONE PATCH TOPICALLY EVERY 24 HOURS FOR 21 DAYS     • aspirin (aspirin) 81 MG EC tablet aspirin 81 mg tablet,delayed release   Take 1 tablet every day by oral route.     • [DISCONTINUED] amiodarone (PACERONE) 200 MG tablet Take 1 tablet by mouth Daily.     • [DISCONTINUED] Eliquis 5 MG tablet tablet Take 1 tablet by mouth 2 (two) times a day.       No current facility-administered medications on file prior to visit.         The following portions of the patient's history were reviewed and updated as appropriate: allergies, current medications, past family history, past medical history, past social history, past surgical history and problem list and are available for review within electronic record    Objective     Vital Signs:   /74   Pulse 69   Temp 97.1 °F (36.2 °C)   Resp 18   Ht 175.8 cm (69.2\")   Wt 92.1 kg (203 lb)   SpO2 98%   BMI 29.80 kg/m²         Physical Exam  Vitals and nursing note reviewed.   Constitutional:       General: He is not in acute distress.     Appearance: Normal appearance. He is well-developed. He is not diaphoretic.   HENT:      Head: Normocephalic and atraumatic.   Eyes:      Conjunctiva/sclera: Conjunctivae normal.      Pupils: Pupils are equal, round, and reactive to light.   Neck:      Vascular: No JVD.   Cardiovascular:      Rate and Rhythm: Normal rate and regular rhythm.      Chest Wall: PMI is not displaced. No thrill.      Pulses: Normal pulses.      Heart sounds: Normal heart sounds. No murmur heard.     Pulmonary:      Effort: Pulmonary effort is " normal. No respiratory distress.      Breath sounds: Normal breath sounds.   Chest:      Chest wall: No tenderness.   Abdominal:      General: Bowel sounds are normal. There is no distension.      Palpations: Abdomen is soft.      Tenderness: There is no abdominal tenderness.   Musculoskeletal:      Cervical back: Normal range of motion.      Right lower leg: No edema.      Left lower leg: No edema.   Skin:     General: Skin is warm and dry.      Coloration: Skin is not pale.      Findings: No erythema.   Neurological:      Mental Status: He is alert and oriented to person, place, and time.   Psychiatric:         Speech: Speech normal.         Behavior: Behavior normal.         Thought Content: Thought content normal.         Judgment: Judgment normal.          Result Review :                         Assessment and Plan      Diagnoses and all orders for this visit:    1. Essential hypertension (Primary)  Assessment & Plan:  Hypertension is improving with treatment.  We will plan to continue his current treatment regimen.  Will reassess in 3 months.  No refills needed today these were already sent last week.  Patient encouraged to pick them up from pharmacy    Orders:  -     Ambulatory Referral to Cardiology    2. NSTEMI (non-ST elevated myocardial infarction) (CMS/[facility])  Assessment & Plan:  Coronary artery disease is stable.  He should continue current treatment regimen.  Encouraged DASH diet and regular physical activity.  Will refer to cardiology since Dr. Michaels does not take his insurance.  Will reassess in 3 months.    Orders:  -     Ambulatory Referral to Cardiology    3. New onset a-fib (CMS/HCC)  Assessment & Plan:  Regular rate and rhythm noted in office to auscultation.  Agree with current medications.  Will refer to cardiology    Orders:  -     Ambulatory Referral to Cardiology    4. Tobacco abuse  Assessment & Plan:  Denny Hutchins  reports that he has been smoking cigarettes. He has been smoking about 0.50  packs per day. He has never used smokeless tobacco.. I have educated him on the risk of diseases from using tobacco products such as cancer, COPD and heart disease.     I advised him to quit and he is willing to quit. We have discussed the following method/s for tobacco cessation:  Nicorette gum.  Together we have set a quit date for 1 week from today.  He will follow up with me in 3 months or sooner to check on his progress.    I spent 3.5 minutes counseling the patient.           Orders:  -     nicotine polacrilex (NICORETTE) 2 MG gum; Chew 1 each As Needed for Smoking Cessation (may use 1 piece every 1-2 hours. Max of 24 pieces in 24 hours.).  Dispense: 220 each; Refill: 0    5. Colon cancer screening  -     Cologuard - Stool, Per Rectum; Future          Follow Up     Patient was given instructions and counseling regarding his condition or for health maintenance advice. Please see specific information pulled into the AVS if appropriate.   Any new medication's adverse effects have been discussed in detail with patient  Patient was encouraged to keep me informed of any acute changes, lack of improvement, or any new concerning symptoms.    Return in about 3 months (around 7/5/2021) for chronic condition follow up.    * Please note that portions of this note were completed with a voice recognition program. Efforts were made to edit the dictation but occasionally words are erroneously transcribed.

## 2021-04-05 NOTE — ASSESSMENT & PLAN NOTE
Denny Hutchins  reports that he has been smoking cigarettes. He has been smoking about 0.50 packs per day. He has never used smokeless tobacco.. I have educated him on the risk of diseases from using tobacco products such as cancer, COPD and heart disease.     I advised him to quit and he is willing to quit. We have discussed the following method/s for tobacco cessation:  Nicorette gum.  Together we have set a quit date for 1 week from today.  He will follow up with me in 3 months or sooner to check on his progress.    I spent 3.5 minutes counseling the patient.

## 2021-04-05 NOTE — ASSESSMENT & PLAN NOTE
Hypertension is improving with treatment.  We will plan to continue his current treatment regimen.  Will reassess in 3 months.  No refills needed today these were already sent last week.  Patient encouraged to pick them up from pharmacy

## 2021-05-04 RX ORDER — TICAGRELOR 90 MG/1
90 TABLET ORAL 2 TIMES DAILY
Qty: 60 TABLET | OUTPATIENT
Start: 2021-05-04

## 2021-05-06 PROBLEM — M54.50 ACUTE RIGHT-SIDED LOW BACK PAIN WITHOUT SCIATICA: Status: RESOLVED | Noted: 2020-08-17 | Resolved: 2021-05-06

## 2021-05-06 PROBLEM — Z91.89 AT RISK OF DISEASE: Status: RESOLVED | Noted: 2021-03-08 | Resolved: 2021-05-06

## 2021-05-06 PROBLEM — R93.1 ABNORMAL ECHOCARDIOGRAM: Status: ACTIVE | Noted: 2021-05-06

## 2021-05-06 PROBLEM — I25.10 CAD (CORONARY ARTERY DISEASE): Status: ACTIVE | Noted: 2021-03-08

## 2021-05-06 NOTE — PROGRESS NOTES
Subjective   Denny Hutchins is a 50 y.o. male.  Primary Care: Bere Dowling, APRN  Referring: Bere Dowling, APRN  [address]  [address]  [place] [address]      Chief Complaint   Patient presents with   • Atrial Fibrillation     Consult   • Coronary Artery Disease       Patient Active Problem List    Diagnosis    • CAD (coronary artery disease)      · NSTEMI, LHC 2-24-21: Subtotal proximal RCA occlusion. 3.5 x 18 mm [facility] stent  · Echo 2-24-21: Normal LV size.  Mild LVH.  EF 50% with inferobasal akinesia.  Abnormal systolic strain pattern.  Abnormal right atrial size.  Dilated right ventricle with abnormal right ventricular function.  Mildly dilated aortic root 3.89 cm.  Mildly dilated ascending aorta 3.92 cm.  Normal valvular morphology.   • Paroxysmal atrial fibrillation (CMS/HCC)      New onset 2/2021 during hospitalization for MI at [facility].  Started on amiodarone, metoprolol, and Eliquis.   Amiodarone and Eliquis discontinued by Dr. Michaels at follow-up.   • Hyperlipidemia    • Essential hypertension    • Tobacco user      Priority: Low   • Needle phobia       History of Present Illness   Is a 50-year-old hypertensive dyslipidemic male smoker who prior to February took no medications. He presented to [facility] with a complaint of neck pain. He ruled in for non-ST elevation MI and underwent cardiac catheterization and received a stent to the RCA. He had an echocardiogram which showed ejection fraction of 50%, right atrial and ventricular enlargement, mildly dilated aortic root and ascending aorta.  At some point during his hospitalization he had atrial fibrillation.  He was initially treated with amiodarone and started on Eliquis however these were discontinued by [facility] cardiology.  He was discharged to home on the below medications. The cardiologist from [facility] does not take his insurance and he was referred by primary care to our service to establish  primary cardiology follow-up since discharge he has had no complaint of exertional chest pain or dyspnea, denies orthopnea, PND, claudication, lower extremity edema.  He does not check his blood pressure at home.  He is tolerating statin therapy.  He has reduced his smoking from about 1 pack/day to a half a pack per day.  He drinks 4-6 beers approximately 5 days/week.  He ran out of Brilinta approximately 4 days ago which has not been refilled.      Past Surgical History:   Procedure Laterality Date   • NO PAST SURGERIES         The following portions of the patient's history were reviewed and updated as appropriate: allergies, current medications, past family history, past medical history, past social history, past surgical history and problem list.    No Known Allergies      Current Outpatient Medications   Medication Instructions   • aspirin (aspirin) 81 MG EC tablet aspirin 81 mg tablet,delayed release   Take 1 tablet every day by oral route.   • atorvastatin (LIPITOR) 80 mg, Oral, Daily   • Brilinta 90 MG tablet tablet 1 tablet, Oral, 2 times daily, NOT TAKING   • lisinopril (PRINIVIL,ZESTRIL) 20 mg, Oral, Daily   • metoprolol tartrate (LOPRESSOR) 25 mg, Oral, 2 times daily   • nicotine polacrilex (NICORETTE) 2 mg, Mouth/Throat, As Needed         Social History     Socioeconomic History   • Marital status: Single     Spouse name: Not on file   • Number of children: Not on file   • Years of education: Not on file   • Highest education level: Not on file   Tobacco Use   • Smoking status: Current Every Day Smoker     Packs/day: 0.50     Types: Cigarettes   • Smokeless tobacco: Never Used   Substance and Sexual Activity   • Alcohol use: Yes     Alcohol/week: 18.0 standard drinks     Types: 18 Cans of beer per week     Comment: social   • Drug use: Never   • Sexual activity: Yes       Family History   Problem Relation Age of Onset   • Diabetes Mother        Objective      /80 (BP Location: Right arm, Patient  "Position: Sitting)   Pulse 66   Ht 180.3 cm (71\")   Wt 93.4 kg (206 lb)   SpO2 97%   BMI 28.73 kg/m²     Constitutional:       Appearance: Well-developed.   Pulmonary:      Effort: Pulmonary effort is normal. No respiratory distress.      Breath sounds: Normal breath sounds. No wheezing. No rales.      Comments: Bases clear  Chest:      Chest wall: Not tender to palpatation.   Cardiovascular:      Normal rate. Regular rhythm.      Murmurs: There is no murmur.      No gallop. No click. No rub.   Pulses:     Intact distal pulses.   Edema:     Peripheral edema absent.   Musculoskeletal: Normal range of motion.         ECG 12 Lead    Date/Time: 5/6/2021 1:58 PM  Performed by: Gutierrez Headley MD  Authorized by: Gutierrez Headley MD   Previous ECG: no previous ECG available  Rhythm: sinus rhythm  Rate: normal  BPM: 66  Conduction: conduction normal  ST Segments: ST segments normal  T Waves: T waves normal  T inversion: III  QRS axis: normal  Other: no other findings    Clinical impression: normal ECG            Lab Review:   Lab Results   Component Value Date    GLUCOSE 100 (H) 08/27/2020    BUN 16 08/27/2020    CREATININE 1.05 08/27/2020    EGFRIFNONA 75 08/27/2020    BCR 15.2 08/27/2020    CO2 24.6 08/27/2020    CALCIUM 9.5 08/27/2020    ALBUMIN 4.50 08/21/2020    AST 21 08/21/2020    ALT 29 08/21/2020       Lab Results   Component Value Date    WBC 5.95 08/21/2020    HGB 15.2 08/21/2020    HCT 44.5 08/21/2020    MCV 97.8 (H) 08/21/2020     08/21/2020       Lab Results   Component Value Date    TSH 1.340 08/21/2020       CARDIAC LABS:      Lab Results   Component Value Date    CHOL 173 08/21/2020    TRIG 84 08/21/2020    HDL 49 08/21/2020     (H) 08/21/2020         SARS-CoV-2, ALISON   Date Value Ref Range Status   07/28/2020 Not Detected Not Detected Final     Comment:                Result Review:    [x]  Laboratory  []  Radiology  [x]  EKG/Telemetry   []  Pathology  [x]  Old records heart catheterization " report and [facility] from [facility]. Primary care notes.   []  Other:        Assessment:   Diagnosis Plan   1. Coronary artery disease involving native coronary artery of native heart without angina pectoris  no current anginal symptoms, continue current medical regimen.  I have represcribed Brilinta and will give him some samples here in the office today for immediate use.   2. Paroxysmal atrial fibrillation (CMS/[facility])  Holter Monitor - 72 Hour Up To 15 Days   3. Essential hypertension   well managed, continue current medical regimen   4. Mixed hyperlipidemia   tolerating high-dose statin therapy   5. Abnormal echocardiogram   continue risk factor management to include blood pressure management and lipid management with future monitoring.   6. Tobacco user   counseled for less than 3 minutes      Plan:  The diagnosis of coronary artery disease, recent stent and paroxysmal atrial fibrillation discussed with the patient with future plan of care.  We will obtain a cardiac monitor to assess any paroxysmal atrial fibrillation burden.  He was strongly advised to cut back on his alcohol consumption.  Importance of medication compliance specially dual antiplatelet therapy strongly emphasized, we gave him samples and prescription refill for Brilinta which he has not taken for 4 days.  Importance of lipid management and smoking cessation strongly emphasized.    Continue current medications.   Cardiology follow-up in 3 months, sooner as needed.  Thank you for allowing us to participate in the care of your patient.     Scribed for Gutierrez Headley MD by Electronically signed by Electronically signed by ALANNA Ewing, 05/07/21, 9:47 AM EDT.      I, Gutierrez Headley MD, personally performed the services described in this documentation as scribed by the above named individual in my presence, and it is both accurate and complete.  5/7/2021  09:52 EDT

## 2021-05-07 ENCOUNTER — CONSULT (OUTPATIENT)
Dept: CARDIOLOGY | Facility: CLINIC | Age: 51
End: 2021-05-07

## 2021-05-07 VITALS
DIASTOLIC BLOOD PRESSURE: 80 MMHG | WEIGHT: 206 LBS | HEIGHT: 71 IN | BODY MASS INDEX: 28.84 KG/M2 | SYSTOLIC BLOOD PRESSURE: 128 MMHG | HEART RATE: 66 BPM | OXYGEN SATURATION: 97 %

## 2021-05-07 DIAGNOSIS — I48.0 PAROXYSMAL ATRIAL FIBRILLATION (HCC): Chronic | ICD-10-CM

## 2021-05-07 DIAGNOSIS — R93.1 ABNORMAL ECHOCARDIOGRAM: ICD-10-CM

## 2021-05-07 DIAGNOSIS — I10 ESSENTIAL HYPERTENSION: Chronic | ICD-10-CM

## 2021-05-07 DIAGNOSIS — I25.10 CORONARY ARTERY DISEASE INVOLVING NATIVE CORONARY ARTERY OF NATIVE HEART WITHOUT ANGINA PECTORIS: Primary | ICD-10-CM

## 2021-05-07 DIAGNOSIS — Z72.0 TOBACCO USER: Chronic | ICD-10-CM

## 2021-05-07 DIAGNOSIS — E78.2 MIXED HYPERLIPIDEMIA: Chronic | ICD-10-CM

## 2021-05-07 PROCEDURE — 93000 ELECTROCARDIOGRAM COMPLETE: CPT | Performed by: INTERNAL MEDICINE

## 2021-05-07 PROCEDURE — 99243 OFF/OP CNSLTJ NEW/EST LOW 30: CPT | Performed by: INTERNAL MEDICINE

## 2021-05-07 RX ORDER — TICAGRELOR 90 MG/1
90 TABLET ORAL 2 TIMES DAILY
Qty: 60 TABLET | Refills: 11 | Status: SHIPPED | OUTPATIENT
Start: 2021-05-07 | End: 2021-06-08 | Stop reason: SDUPTHER

## 2021-05-10 ENCOUNTER — TELEPHONE (OUTPATIENT)
Dept: INTERNAL MEDICINE | Facility: CLINIC | Age: 51
End: 2021-05-10

## 2021-05-10 NOTE — TELEPHONE ENCOUNTER
Called and spoke to patient, he states that it is located on the bottom right side. It happened this AM. He states that he turned the wrong way and bent over the wrong way and it hurts. He states that he has had this before and that normally he gets steroids and it helps with the overall pain and swelling.

## 2021-05-10 NOTE — TELEPHONE ENCOUNTER
Called and spoke with patient, he is agreeable to having an appointment. Scheduled him for tomorrow 05/11 at 9:45 with Bere Dowling.

## 2021-05-10 NOTE — TELEPHONE ENCOUNTER
Caller: Denny Hutchins    Relationship: Self    Best call back number: [phone]     What medication are you requesting: STERIOD    What are your current symptoms: PULLED BACK MUSCLES    How long have you been experiencing symptoms:     Have you had these symptoms before:    [x] Yes  [] No    Have you been treated for these symptoms before:   [x] Yes  [] No    If a prescription is needed, what is your preferred pharmacy and phone number: [id number] - [place], [place] - [address] [phone] [facility] [phone] FX     Additional notes:

## 2021-05-11 ENCOUNTER — OFFICE VISIT (OUTPATIENT)
Dept: INTERNAL MEDICINE | Facility: CLINIC | Age: 51
End: 2021-05-11

## 2021-05-11 VITALS
BODY MASS INDEX: 28.7 KG/M2 | DIASTOLIC BLOOD PRESSURE: 82 MMHG | SYSTOLIC BLOOD PRESSURE: 116 MMHG | HEIGHT: 71 IN | RESPIRATION RATE: 18 BRPM | HEART RATE: 64 BPM | WEIGHT: 205 LBS | TEMPERATURE: 97.5 F | OXYGEN SATURATION: 98 %

## 2021-05-11 DIAGNOSIS — S39.012A LUMBOSACRAL STRAIN, INITIAL ENCOUNTER: Primary | ICD-10-CM

## 2021-05-11 PROCEDURE — 99213 OFFICE O/P EST LOW 20 MIN: CPT | Performed by: NURSE PRACTITIONER

## 2021-05-11 RX ORDER — CYCLOBENZAPRINE HCL 10 MG
10 TABLET ORAL 3 TIMES DAILY PRN
Qty: 45 TABLET | Refills: 0 | Status: SHIPPED | OUTPATIENT
Start: 2021-05-11 | End: 2021-06-02

## 2021-05-11 NOTE — PATIENT INSTRUCTIONS
Back Exercises  These exercises help to make your trunk and back strong. They also help to keep the lower back flexible. Doing these exercises can help to prevent back pain or lessen existing pain.  · If you have back pain, try to do these exercises 2-3 times each day or as told by your doctor.  · As you get better, do the exercises once each day. Repeat the exercises more often as told by your doctor.  · To stop back pain from coming back, do the exercises once each day, or as told by your doctor.  Exercises  Single knee to chest  Do these steps 3-5 times in a row for each le. Lie on your back on a firm bed or the floor with your legs stretched out.  2. Bring one knee to your chest.  3. Grab your knee or thigh with both hands and hold them it in place.  4. Pull on your knee until you feel a gentle stretch in your lower back or buttocks.  5. Keep doing the stretch for 10-30 seconds.  6. Slowly let go of your leg and straighten it.  Pelvic tilt  Do these steps 5-10 times in a row:  1. Lie on your back on a firm bed or the floor with your legs stretched out.  2. Bend your knees so they point up to the ceiling. Your feet should be flat on the floor.  3. Tighten your lower belly (abdomen) muscles to press your lower back against the floor. This will make your tailbone point up to the ceiling instead of pointing down to your feet or the floor.  4. Stay in this position for 5-10 seconds while you gently tighten your muscles and breathe evenly.  Cat-cow  Do these steps until your lower back bends more easily:  1. Get on your hands and knees on a firm surface. Keep your hands under your shoulders, and keep your knees under your hips. You may put padding under your knees.  2. Let your head hang down toward your chest. Tighten (contract) the muscles in your belly. Point your tailbone toward the floor so your lower back becomes rounded like the back of a cat.  3. Stay in this position for 5 seconds.  4. Slowly lift your  head. Let the muscles of your belly relax. Point your tailbone up toward the ceiling so your back forms a sagging arch like the back of a cow.  5. Stay in this position for 5 seconds.    Press-ups  Do these steps 5-10 times in a row:  1. Lie on your belly (face-down) on the floor.  2. Place your hands near your head, about shoulder-width apart.  3. While you keep your back relaxed and keep your hips on the floor, slowly straighten your arms to raise the top half of your body and lift your shoulders. Do not use your back muscles. You may change where you place your hands in order to make yourself more comfortable.  4. Stay in this position for 5 seconds.  5. Slowly return to lying flat on the floor.    Bridges  Do these steps 10 times in a row:  1. Lie on your back on a firm surface.  2. Bend your knees so they point up to the ceiling. Your feet should be flat on the floor. Your arms should be flat at your sides, next to your body.  3. Tighten your butt muscles and lift your butt off the floor until your waist is almost as high as your knees. If you do not feel the muscles working in your butt and the back of your thighs, slide your feet 1-2 inches farther away from your butt.  4. Stay in this position for 3-5 seconds.  5. Slowly lower your butt to the floor, and let your butt muscles relax.  If this exercise is too easy, try doing it with your arms crossed over your chest.  Belly crunches  Do these steps 5-10 times in a row:  1. Lie on your back on a firm bed or the floor with your legs stretched out.  2. Bend your knees so they point up to the ceiling. Your feet should be flat on the floor.  3. Cross your arms over your chest.  4. Tip your chin a little bit toward your chest but do not bend your neck.  5. Tighten your belly muscles and slowly raise your chest just enough to lift your shoulder blades a tiny bit off of the floor. Avoid raising your body higher than that, because it can put too much stress on your low  back.  6. Slowly lower your chest and your head to the floor.  Back lifts  Do these steps 5-10 times in a row:  1. Lie on your belly (face-down) with your arms at your sides, and rest your forehead on the floor.  2. Tighten the muscles in your legs and your butt.  3. Slowly lift your chest off of the floor while you keep your hips on the floor. Keep the back of your head in line with the curve in your back. Look at the floor while you do this.  4. Stay in this position for 3-5 seconds.  5. Slowly lower your chest and your face to the floor.  Contact a doctor if:  · Your back pain gets a lot worse when you do an exercise.  · Your back pain does not get better 2 hours after you exercise.  If you have any of these problems, stop doing the exercises. Do not do them again unless your doctor says it is okay.  Get help right away if:  · You have sudden, very bad back pain. If this happens, stop doing the exercises. Do not do them again unless your doctor says it is okay.  This information is not intended to replace advice given to you by your health care provider. Make sure you discuss any questions you have with your health care provider.  Document Revised: 09/12/2019 Document Reviewed: 09/12/2019  Elsevier Patient Education © 2021 [employer]

## 2021-05-11 NOTE — PROGRESS NOTES
Chief Complaint  Back Pain (lower right back pain-pulled muscle yesterday.  took tylenol, slept on heating pad)    Subjective          Denny Hutchins presents to [facility] for   Back Pain  This is a new problem. The current episode started yesterday. The problem occurs constantly. The problem has been gradually improving since onset. The pain is present in the lumbar spine. The quality of the pain is described as aching. The pain does not radiate. The pain is at a severity of 7/10. The symptoms are aggravated by bending and twisting. Pertinent negatives include no abdominal pain, bladder incontinence, bowel incontinence, chest pain, dysuria, fever, headaches, leg pain, numbness, paresis, paresthesias, pelvic pain, perianal numbness, tingling, weakness or weight loss. Risk factors include poor posture. He has tried heat (tylenol) for the symptoms. The treatment provided mild relief.   Patient states yesterday morning he bent over and felt pain in his right low back.  He was not lifting anything heavy at the time. Pain is a lot better today.       No Known Allergies  Current Outpatient Medications on File Prior to Visit   Medication Sig Dispense Refill   • aspirin (aspirin) 81 MG EC tablet aspirin 81 mg tablet,delayed release   Take 1 tablet every day by oral route.     • atorvastatin (LIPITOR) 80 MG tablet Take 1 tablet by mouth Daily. 30 tablet 3   • Brilinta 90 MG tablet tablet Take 1 tablet by mouth 2 (two) times a day. 60 tablet 11   • lisinopril (PRINIVIL,ZESTRIL) 20 MG tablet Take 1 tablet by mouth Daily. 30 tablet 3   • metoprolol tartrate (LOPRESSOR) 50 MG tablet Take 0.5 tablets by mouth 2 (two) times a day. 30 tablet 3   • nicotine polacrilex (NICORETTE) 2 MG gum Chew 1 each As Needed for Smoking Cessation (may use 1 piece every 1-2 hours. Max of 24 pieces in 24 hours.). 220 each 0     No current facility-administered medications on file prior to visit.         The  "following portions of the patient's history were reviewed and updated as appropriate: allergies, current medications, past family history, past medical history, past social history, past surgical history and problem list and are available for review within electronic record    Objective     Vital Signs:   /82   Pulse 64   Temp 97.5 °F (36.4 °C)   Resp 18   Ht 180.3 cm (71\")   Wt 93 kg (205 lb)   SpO2 98%   BMI 28.59 kg/m²         Physical Exam  Vitals and nursing note reviewed.   Constitutional:       Appearance: Normal appearance. He is well-developed.   HENT:      Head: Normocephalic and atraumatic.   Eyes:      Conjunctiva/sclera: Conjunctivae normal.      Pupils: Pupils are equal, round, and reactive to light.   Cardiovascular:      Rate and Rhythm: Normal rate and regular rhythm.      Heart sounds: Normal heart sounds.   Pulmonary:      Effort: Pulmonary effort is normal. No respiratory distress.      Breath sounds: Normal breath sounds.   Abdominal:      General: Bowel sounds are normal. There is no distension.      Palpations: Abdomen is soft.      Tenderness: There is no abdominal tenderness.   Musculoskeletal:      Cervical back: Normal range of motion.      Lumbar back: Spasms and tenderness present. No swelling, edema, deformity, signs of trauma, lacerations or bony tenderness. Normal range of motion. Negative right straight leg raise test and negative left straight leg raise test. No scoliosis.      Comments: Normal heel and toe walks.  Normal squat and rise.   Skin:     General: Skin is warm and dry.   Neurological:      Mental Status: He is alert and oriented to person, place, and time.      Cranial Nerves: No cranial nerve deficit.      Sensory: No sensory deficit.      Deep Tendon Reflexes: Reflexes are normal and symmetric.   Psychiatric:         Speech: Speech normal.         Behavior: Behavior normal.         Thought Content: Thought content normal.         Judgment: Judgment normal. "          Result Review :                         Assessment and Plan      Diagnoses and all orders for this visit:    1. Lumbosacral strain, initial encounter (Primary)  -     cyclobenzaprine (FLEXERIL) 10 MG tablet; Take 1 tablet by mouth 3 (Three) Times a Day As Needed for Muscle Spasms.  Dispense: 45 tablet; Refill: 0    He used tylenol and heating pad yesterday which did help somewhat.  We will add on cyclobenzaprine.  Adverse effects discussed.  Warm compress or heating pad to affected area, 3 times daily prn x 20 minutes with barrier between heat source and skin  Discussed the use of TENS unit over-the-counter and back stretches.   Info in AVS related to stretches as well        Follow Up     Patient was given instructions and counseling regarding his condition or for health maintenance advice. Please see specific information pulled into the AVS if appropriate.   Any new medication's adverse effects have been discussed in detail with patient  Patient was encouraged to keep me informed of any acute changes, lack of improvement, or any new concerning symptoms.    Return if symptoms worsen or fail to improve.    * Please note that portions of this note were completed with a voice recognition program. Efforts were made to edit the dictation but occasionally words are erroneously transcribed.

## 2021-06-02 DIAGNOSIS — S39.012A LUMBOSACRAL STRAIN, INITIAL ENCOUNTER: ICD-10-CM

## 2021-06-02 RX ORDER — CYCLOBENZAPRINE HCL 10 MG
10 TABLET ORAL 3 TIMES DAILY PRN
Qty: 45 TABLET | Refills: 0 | Status: SHIPPED | OUTPATIENT
Start: 2021-06-02 | End: 2022-01-25 | Stop reason: SDUPTHER

## 2021-06-08 RX ORDER — TICAGRELOR 90 MG/1
90 TABLET ORAL 2 TIMES DAILY
Qty: 180 TABLET | Refills: 3 | Status: SHIPPED | OUTPATIENT
Start: 2021-06-08 | End: 2021-08-06 | Stop reason: ALTCHOICE

## 2021-07-12 ENCOUNTER — OFFICE VISIT (OUTPATIENT)
Dept: INTERNAL MEDICINE | Facility: CLINIC | Age: 51
End: 2021-07-12

## 2021-07-12 VITALS
RESPIRATION RATE: 18 BRPM | SYSTOLIC BLOOD PRESSURE: 128 MMHG | TEMPERATURE: 97.7 F | DIASTOLIC BLOOD PRESSURE: 76 MMHG | OXYGEN SATURATION: 98 % | HEART RATE: 69 BPM | BODY MASS INDEX: 29.54 KG/M2 | HEIGHT: 71 IN | WEIGHT: 211 LBS

## 2021-07-12 DIAGNOSIS — F41.9 ANXIETY: ICD-10-CM

## 2021-07-12 DIAGNOSIS — E78.2 MIXED HYPERLIPIDEMIA: Primary | ICD-10-CM

## 2021-07-12 DIAGNOSIS — I10 ESSENTIAL HYPERTENSION: ICD-10-CM

## 2021-07-12 DIAGNOSIS — S39.012D LUMBOSACRAL STRAIN, SUBSEQUENT ENCOUNTER: ICD-10-CM

## 2021-07-12 DIAGNOSIS — Z72.0 TOBACCO ABUSE: ICD-10-CM

## 2021-07-12 PROCEDURE — 99214 OFFICE O/P EST MOD 30 MIN: CPT | Performed by: NURSE PRACTITIONER

## 2021-07-12 PROCEDURE — 99406 BEHAV CHNG SMOKING 3-10 MIN: CPT | Performed by: NURSE PRACTITIONER

## 2021-07-12 RX ORDER — ATORVASTATIN CALCIUM 80 MG/1
80 TABLET, FILM COATED ORAL DAILY
Qty: 30 TABLET | Refills: 3 | Status: SHIPPED | OUTPATIENT
Start: 2021-07-12 | End: 2021-11-22

## 2021-07-12 RX ORDER — LISINOPRIL 20 MG/1
20 TABLET ORAL DAILY
Qty: 30 TABLET | Refills: 3 | Status: SHIPPED | OUTPATIENT
Start: 2021-07-12 | End: 2021-10-26 | Stop reason: DRUGHIGH

## 2021-07-12 RX ORDER — METOPROLOL TARTRATE 50 MG/1
25 TABLET, FILM COATED ORAL 2 TIMES DAILY
Qty: 30 TABLET | Refills: 3 | Status: SHIPPED | OUTPATIENT
Start: 2021-07-12 | End: 2021-08-06 | Stop reason: SDUPTHER

## 2021-07-12 RX ORDER — ESCITALOPRAM OXALATE 5 MG/1
5 TABLET ORAL DAILY
Qty: 30 TABLET | Refills: 1 | Status: SHIPPED | OUTPATIENT
Start: 2021-07-12 | End: 2021-08-24 | Stop reason: SDUPTHER

## 2021-07-12 NOTE — PROGRESS NOTES
Denny Hutchins presents to [facility] for     Chief Complaint  Hyperlipidemia and Hypertension    Subjective        History of Present Illness    Denny is a 50-year-old male who is here to follow-up on hypertension and hyperlipidemia.  He did have a non-ST elevated MI in February 2021 with a drug-eluting stent placed to the RCA..  He follows with Dr. Headley for this.  Currently on metoprolol, lisinopril.  Compliant with dosing denies adverse effects.  Denies headaches, dizziness, visual disturbances, palpitations chest pain, dyspnea, TIA or CVA symptoms, leg pain/claudication symptoms, and edema.    Hyperlipidemia-  Currently on statin therapy.      He is still smoking about 1/2 PPD.   He has been using the nicotine gum which helps some.  He did try patches earlier this year and those did not help.  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  Has a couple beers a few nights a week or more when he is with friends.  He tells me that he has been working on quitting smoking but every time he has beer that is a trigger for him to want to smoke.    LS strain pain resolved after our last visit.  Low back pain restarted last week when he got out of the shower and bent over to put on clothes.  Pain is mild and is gradually improving.  He has been using some the cyclobenzaprine he had leftover from the lumbosacral strain as well as 2 extra strength Tylenol every morning.  He reports that pain is improving steadily.  Denies any weakness, numbness, tingling, or bowel or bladder involvement.        Review of Systems   Constitutional: Negative for fatigue, fever and unexpected weight loss.   HENT: Negative.    Eyes: Negative for blurred vision, double vision, pain and visual disturbance.   Respiratory: Negative for cough, chest tightness, shortness of breath and wheezing.    Cardiovascular: Negative for chest pain,  palpitations and leg swelling.   Gastrointestinal: Negative for abdominal pain, constipation, diarrhea, nausea and vomiting.   Genitourinary: Negative.  Negative for difficulty urinating, frequency and urgency.   Musculoskeletal: Positive for back pain. Negative for arthralgias and myalgias.   Skin: Negative for color change and rash.   Neurological: Negative for dizziness, weakness, light-headedness, headache and confusion.   Hematological: Negative for adenopathy. Does not bruise/bleed easily.   Psychiatric/Behavioral: Positive for stress. The patient is nervous/anxious.          No Known Allergies  Current Outpatient Medications on File Prior to Visit   Medication Sig Dispense Refill   • aspirin (aspirin) 81 MG EC tablet aspirin 81 mg tablet,delayed release   Take 1 tablet every day by oral route.     • Brilinta 90 MG tablet tablet Take 1 tablet by mouth 2 (two) times a day. 180 tablet 3   • cyclobenzaprine (FLEXERIL) 10 MG tablet TAKE 1 TABLET BY MOUTH 3 (THREE) TIMES A DAY AS NEEDED FOR MUSCLE SPASMS. 45 tablet 0   • nicotine polacrilex (NICORETTE) 2 MG gum Chew 1 each As Needed for Smoking Cessation (may use 1 piece every 1-2 hours. Max of 24 pieces in 24 hours.). 220 each 0   • [DISCONTINUED] atorvastatin (LIPITOR) 80 MG tablet Take 1 tablet by mouth Daily. 30 tablet 3   • [DISCONTINUED] lisinopril (PRINIVIL,ZESTRIL) 20 MG tablet Take 1 tablet by mouth Daily. 30 tablet 3   • [DISCONTINUED] metoprolol tartrate (LOPRESSOR) 50 MG tablet Take 0.5 tablets by mouth 2 (two) times a day. 30 tablet 3     No current facility-administered medications on file prior to visit.         The following portions of the patient's history were reviewed and updated as appropriate: allergies, current medications, past family history, past medical history, past social history, past surgical history and problem list and are available for review within electronic record    Objective     Vital Signs:   /76   Pulse 69   Temp  "97.7 °F (36.5 °C)   Resp 18   Ht 180.3 cm (71\")   Wt 95.7 kg (211 lb)   SpO2 98%   BMI 29.43 kg/m²         Physical Exam  Vitals and nursing note reviewed.   Constitutional:       General: He is not in acute distress.     Appearance: Normal appearance. He is well-developed. He is not diaphoretic.   HENT:      Head: Normocephalic and atraumatic.   Eyes:      Conjunctiva/sclera: Conjunctivae normal.      Pupils: Pupils are equal, round, and reactive to light.   Neck:      Vascular: No JVD.   Cardiovascular:      Rate and Rhythm: Normal rate and regular rhythm.      Heart sounds: Normal heart sounds. No murmur heard.     Pulmonary:      Effort: Pulmonary effort is normal. No respiratory distress.      Breath sounds: Normal breath sounds.   Chest:      Chest wall: No tenderness.   Abdominal:      General: Bowel sounds are normal. There is no distension.      Palpations: Abdomen is soft.      Tenderness: There is no abdominal tenderness.   Musculoskeletal:      Cervical back: Normal range of motion.      Lumbar back: Spasms and tenderness present. No swelling, edema, deformity, signs of trauma, lacerations or bony tenderness. Normal range of motion. Negative right straight leg raise test and negative left straight leg raise test. No scoliosis.      Comments: Normal heel and toe walks.  Normal squat and rise.   Skin:     General: Skin is warm and dry.      Coloration: Skin is not pale.      Findings: No erythema.   Neurological:      Mental Status: He is alert and oriented to person, place, and time.      Cranial Nerves: No cranial nerve deficit.      Sensory: No sensory deficit.      Deep Tendon Reflexes: Reflexes are normal and symmetric.   Psychiatric:         Speech: Speech normal.         Behavior: Behavior normal.         Thought Content: Thought content normal.         Judgment: Judgment normal.          Result Review :     The following data was reviewed by: JW Roberson on 07/12/2021:  Common labs  "   Common Labsle 8/21/20 8/21/20 8/21/20 8/27/20    0908 [id number]    Glucose  94  100 (A)   BUN  15  16   Creatinine  1.13  1.05   eGFR Non African Am  69  75   Sodium  131 (A)  134 (A)   Potassium  5.2  4.9   Chloride  95 (A)  100   Calcium  9.2  9.5   Albumin  4.50     Total Bilirubin  0.4     Alkaline Phosphatase  52     AST (SGOT)  21     ALT (SGPT)  29     WBC 5.95      Hemoglobin 15.2      Hematocrit 44.5      Platelets 220      Total Cholesterol   173    Triglycerides   84    HDL Cholesterol   49    LDL Cholesterol    107 (A)    (A) Abnormal value            CBC    CBC 8/21/20   WBC 5.95   RBC 4.55   Hemoglobin 15.2   Hematocrit 44.5   MCV 97.8 (A)   MCH 33.4 (A)   MCHC 34.2   RDW 11.9 (A)   Platelets 220   (A) Abnormal value            Lipid Panel    Lipid Panel 8/21/20   Total Cholesterol 173   Triglycerides 84   HDL Cholesterol 49   VLDL Cholesterol 16.8   LDL Cholesterol  107 (A)   LDL/HDL Ratio 2.19   (A) Abnormal value            TSH    TSH 8/21/20   TSH 1.340                             Assessment and Plan      Diagnoses and all orders for this visit:    1. Mixed hyperlipidemia (Primary)  -     atorvastatin (LIPITOR) 80 MG tablet; Take 1 tablet by mouth Daily.  Dispense: 30 tablet; Refill: 3  -     Comprehensive Metabolic Panel; Future  -     Lipid Panel; Future  LDL high on 8/20 labs.  Continue statin therapy.  Low-fat low-cholesterol diet.  2. Essential hypertension  -     metoprolol tartrate (LOPRESSOR) 50 MG tablet; Take 0.5 tablets by mouth 2 (two) times a day.  Dispense: 30 tablet; Refill: 3  -     lisinopril (PRINIVIL,ZESTRIL) 20 MG tablet; Take 1 tablet by mouth Daily.  Dispense: 30 tablet; Refill: 3  -     Comprehensive Metabolic Panel; Future  -     Lipid Panel; Future  Hypertension is well controlled.  Continue lisinopril metoprolol at current doses.  Recheck labs as above.    3. Anxiety  -     escitalopram (Lexapro) 5 MG tablet; Take 1 tablet by mouth Daily.  Dispense: 30 tablet; Refill:  1  Discussed stress reduction techniques.  We will go ahead and get him started on a low-dose of Lexapro to see if this helps.  Hopefully he will be able to decrease alcohol consumption and tobacco use as anxiety improves.  4. Lumbosacral strain, subsequent encounter  Cyclobenzaprine as needed.  No refill needed today.  He will let me know if he needs additional prescription.  5. Tobacco abuse  Denny Hutchins  reports that he has been smoking cigarettes. He has a 12.50 pack-year smoking history. He has never used smokeless tobacco.. I have educated him on the risk of diseases from using tobacco products such as cancer, COPD and heart disease.     I advised him to quit and he is willing to quit. We have discussed the following method/s for tobacco cessation:  Nicotine gum, stress reduction, treat anxiety..  Together we have set a quit date for 6 weeks.  He will follow up with me in 6 weeks or sooner to check on his progress.    I spent 3  minutes counseling the patient.      Follow Up     Patient was given instructions and counseling regarding his condition or for health maintenance advice. Please see specific information pulled into the AVS if appropriate.   Any new medication's adverse effects have been discussed in detail with patient  Patient was encouraged to keep me informed of any acute changes, lack of improvement, or any new concerning symptoms.    Return in about 6 weeks (around 8/23/2021) for chronic condition follow up, Recheck, future labs ordered.    * Please note that portions of this note were completed with a voice recognition program. Efforts were made to edit the dictation but occasionally words are erroneously transcribed.

## 2021-08-05 NOTE — PROGRESS NOTES
[facility]    Encounter Date: 2021    Patient ID: Denny Hutchins is a 51 y.o. male.  : 1970     PCP: Bere Dowling APRN       Chief Complaint: Coronary Artery Disease      PROBLEM LIST:  1. Coronary artery disease  a. NSTEMI, 2021  b. LHC, 2021: Subtotal proximal RCA occlusion. 3.5 x 18 mm Resolute Kennard stent  c. Echo 2021: Normal LV size.  Mild LVH.  EF 50% with inferobasal akinesia. Abnormal systolic strain pattern. Abnormal RA size.  Dilated right ventricle with abnormal right ventricular function. Mildly dilated aortic root 3.89 cm. Mildly dilated ascending aorta 3.92 cm. Normal valvular morphology.  2. Paroxysmal atrial fibrillation  a. New onset 2021 during hospitalization for MI at [place].  Started on amiodarone, metoprolol, and Eliquis. Amiodarone and Eliquis discontinued by Dr. Michaels at follow-up.  b. Event monitor, 2021: Monitored for 12 days. Baseline SR with PAC.   3. Hyperlipidemia   4. Essential hypertension   5. Tobacco use   6. Needle phobia     History of Present Illness  Patient presents today for a 3 month follow-up with a history of coronary artery disease, paroxysmal atrial fibrillation, and cardiac risk factors. Since last visit he has done fairly well from cardiac standpoint.  Denies chest pain or shortness of breath with normal activities.  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  He is unsure if this started since starting Brilinta. He denies experiencing shortness of breath while climbing stairs or walking. Patient denies chest pain, palpitations, edema, dizziness, and syncope.       No Known Allergies      Current Outpatient Medications:   •  aspirin (aspirin) 81 MG EC tablet, aspirin 81 mg tablet,delayed release  Take 1 tablet every day by oral route., Disp: , Rfl:   •  atorvastatin (LIPITOR) 80 MG tablet, Take 1 tablet by mouth Daily., Disp: 30  "tablet, Rfl: 3  •  cyclobenzaprine (FLEXERIL) 10 MG tablet, TAKE 1 TABLET BY MOUTH 3 (THREE) TIMES A DAY AS NEEDED FOR MUSCLE SPASMS., Disp: 45 tablet, Rfl: 0  •  escitalopram (Lexapro) 5 MG tablet, Take 1 tablet by mouth Daily., Disp: 30 tablet, Rfl: 1  •  lisinopril (PRINIVIL,ZESTRIL) 20 MG tablet, Take 1 tablet by mouth Daily., Disp: 30 tablet, Rfl: 3  •  metoprolol tartrate (LOPRESSOR) 50 MG tablet, Take 1 tablet by mouth 2 (Two) Times a Day., Disp: 180 tablet, Rfl: 3  •  nicotine polacrilex (NICORETTE) 2 MG gum, Chew 1 each As Needed for Smoking Cessation (may use 1 piece every 1-2 hours. Max of 24 pieces in 24 hours.)., Disp: 220 each, Rfl: 0  •  clopidogrel (PLAVIX) 75 MG tablet, Take 1 tablet by mouth Daily., Disp: 90 tablet, Rfl: 3    The following portions of the patient's history were reviewed and updated as appropriate: allergies, current medications, past family history, past medical history, past social history, past surgical history and problem list.    ROS  Review of Systems   Constitution: Negative for chills, fever, fatigue, generalized weakness.   Cardiovascular: Negative for chest pain, dyspnea on exertion, leg swelling, palpitations, orthopnea, and syncope.   Respiratory: Negative for cough and wheezing. Positive for shortness of breath  HENT: Negative for ear pain, nosebleeds, and tinnitus.  Gastrointestinal: Negative for abdominal pain, constipation, diarrhea, nausea and vomiting.   Genitourinary: No urinary symptoms.  Musculoskeletal: Negative for muscle cramps.  Neurological: Negative for dizziness, headaches, loss of balance, numbness, and symptoms of stroke.  Psychiatric: Normal mental status.     All other systems reviewed and are negative.        Objective:     /90 (BP Location: Left arm, Patient Position: Sitting, Cuff Size: Adult)   Pulse 66   Ht 180.3 cm (71\")   Wt 97.1 kg (214 lb)   SpO2 99%   BMI 29.85 kg/m²    BP repeat: 140/78    Physical Exam  Constitutional: Patient " appears well-developed and well-nourished.   HENT: HEENT exam unremarkable.   Neck: Neck supple. No JVD present. No carotid bruits.   Cardiovascular: Normal rate, regular rhythm and normal heart sounds. No murmur heard.   2+ symmetric pulses.   Pulmonary/Chest: Breath sounds normal. Does not exhibit tenderness.   Abdominal: Abdomen benign.   Musculoskeletal: Does not exhibit edema.   Neurological: Neurological exam unremarkable.   Vitals reviewed.    Data Review:   Lab Results   Component Value Date    GLUCOSE 100 (H) 08/27/2020    BUN 16 08/27/2020    CREATININE 1.05 08/27/2020    EGFRIFNONA 75 08/27/2020    BCR 15.2 08/27/2020    K 4.9 08/27/2020    CO2 24.6 08/27/2020    CALCIUM 9.5 08/27/2020    ALBUMIN 4.50 08/21/2020    AST 21 08/21/2020    ALT 29 08/21/2020     Lab Results   Component Value Date    CHOL 173 08/21/2020    TRIG 84 08/21/2020    HDL 49 08/21/2020     (H) 08/21/2020      Lab Results   Component Value Date    WBC 5.95 08/21/2020    RBC 4.55 08/21/2020    HGB 15.2 08/21/2020    HCT 44.5 08/21/2020    MCV 97.8 (H) 08/21/2020     08/21/2020     Lab Results   Component Value Date    TSH 1.340 08/21/2020          Procedures       Assessment:      Diagnosis Plan   1. Coronary artery disease involving native coronary artery of native heart without angina pectoris  Discontinue Brilinta 90 mg BID and begin Plavix 75 mg daily for antiplatelet therapy due to shortness of breath. Continue aspirin 81 mg daily   2. Paroxysmal atrial fibrillation (CMS/HCC)  Stable; no recurrence, increase metoprolol tartrate 50 mg from half tablet to full tablet BID for better control of rate and blood pressure.    3.        4. Essential hypertension         Dyslipidemia, goal LDL less than 70. Increase metoprolol tartrate 50 mg from half tablet to full tablet BID for improved control of blood pressure which may be contributing to dyspnea.   Continue statin therapy.     Plan:   Increase metoprolol 0.5 tablet 50 mg  BID to full tablet BID for improved control of blood pressure.  Discontinue Brilinta 90 mg BID which may be contributing to his random nonspecific dyspnea and replace with Plavix 75 mg.    Continue all other current medications.   FU in 6 MO, sooner as needed.  Thank you for allowing us to participate in the care of your patient.     Scribed for Gutierrez Headley MD by Lucía Dinero. 8/6/2021 09:16 EDT    I, Gutierrez Headley MD, personally performed the services described in this documentation as scribed by the above named individual in my presence, and it is both accurate and complete.  8/6/2021  09:26 EDT        Please note that portions of this note may have been completed with a voice recognition program. Efforts were made to edit the dictations, but occasionally words are mistranscribed.

## 2021-08-06 ENCOUNTER — OFFICE VISIT (OUTPATIENT)
Dept: CARDIOLOGY | Facility: CLINIC | Age: 51
End: 2021-08-06

## 2021-08-06 VITALS
OXYGEN SATURATION: 99 % | DIASTOLIC BLOOD PRESSURE: 90 MMHG | BODY MASS INDEX: 29.96 KG/M2 | WEIGHT: 214 LBS | HEART RATE: 66 BPM | HEIGHT: 71 IN | SYSTOLIC BLOOD PRESSURE: 146 MMHG

## 2021-08-06 DIAGNOSIS — I48.0 PAROXYSMAL ATRIAL FIBRILLATION (HCC): ICD-10-CM

## 2021-08-06 DIAGNOSIS — I25.10 CORONARY ARTERY DISEASE INVOLVING NATIVE CORONARY ARTERY OF NATIVE HEART WITHOUT ANGINA PECTORIS: Primary | ICD-10-CM

## 2021-08-06 DIAGNOSIS — Z72.0 TOBACCO USER: ICD-10-CM

## 2021-08-06 DIAGNOSIS — I10 ESSENTIAL HYPERTENSION: ICD-10-CM

## 2021-08-06 PROCEDURE — 99214 OFFICE O/P EST MOD 30 MIN: CPT | Performed by: INTERNAL MEDICINE

## 2021-08-06 RX ORDER — METOPROLOL TARTRATE 50 MG/1
50 TABLET, FILM COATED ORAL 2 TIMES DAILY
Qty: 180 TABLET | Refills: 3 | Status: SHIPPED | OUTPATIENT
Start: 2021-08-06 | End: 2022-08-04

## 2021-08-06 RX ORDER — CLOPIDOGREL BISULFATE 75 MG/1
75 TABLET ORAL DAILY
Qty: 90 TABLET | Refills: 3 | Status: SHIPPED | OUTPATIENT
Start: 2021-08-06 | End: 2022-08-04

## 2021-08-24 ENCOUNTER — OFFICE VISIT (OUTPATIENT)
Dept: INTERNAL MEDICINE | Facility: CLINIC | Age: 51
End: 2021-08-24

## 2021-08-24 VITALS
TEMPERATURE: 97.3 F | WEIGHT: 214.2 LBS | RESPIRATION RATE: 16 BRPM | BODY MASS INDEX: 29.99 KG/M2 | SYSTOLIC BLOOD PRESSURE: 124 MMHG | DIASTOLIC BLOOD PRESSURE: 86 MMHG | HEART RATE: 61 BPM | HEIGHT: 71 IN | OXYGEN SATURATION: 100 %

## 2021-08-24 DIAGNOSIS — K04.7 DENTAL ABSCESS: ICD-10-CM

## 2021-08-24 DIAGNOSIS — F41.9 ANXIETY: Primary | ICD-10-CM

## 2021-08-24 PROCEDURE — 99214 OFFICE O/P EST MOD 30 MIN: CPT | Performed by: NURSE PRACTITIONER

## 2021-08-24 RX ORDER — AMOXICILLIN 500 MG/1
500 CAPSULE ORAL 3 TIMES DAILY
Qty: 9 CAPSULE | Refills: 0 | Status: SHIPPED | OUTPATIENT
Start: 2021-08-24 | End: 2021-08-27

## 2021-08-24 RX ORDER — ESCITALOPRAM OXALATE 10 MG/1
10 TABLET ORAL DAILY
Qty: 30 TABLET | Refills: 3 | Status: SHIPPED | OUTPATIENT
Start: 2021-08-24 | End: 2022-01-04

## 2021-08-24 NOTE — PROGRESS NOTES
Denny Hutchins presents to [facility] for     Chief Complaint  Chief Complaint   Patient presents with   • Anxiety     6 week recheck, symptoms have not improved.          Subjective        History of Present Illness    Denny is a 51-year-old male who is here to follow-up on some increased anxiety.  At his last visit in July 2021 we addressed his stress/anxiety and started him on Lexapro 5 mg.  He tells me that this has not made much of a difference in his symptoms.  However he has not been very diligent about taking it every day.      C/o abscessed tooth.   Pain and swelling to the right lower gums.   Onset: Greater than 1 week  Location: Right lower gum  Duration: Constant  Characteristics: Pain and swelling, no fever  Aggravating factors: Chewing  Alleviating factors: None  Radiation: None  Severity: Mild      Review of Systems   Constitutional: Negative for chills, diaphoresis, fatigue, fever and unexpected weight loss.   HENT: Positive for dental problem.    Eyes: Negative for blurred vision, double vision and visual disturbance.   Respiratory: Negative for cough, shortness of breath and wheezing.    Cardiovascular: Negative for chest pain, palpitations and leg swelling.   Gastrointestinal: Negative for abdominal pain, constipation, diarrhea, nausea and vomiting.   Genitourinary: Negative for difficulty urinating, frequency and urgency.   Musculoskeletal: Negative for arthralgias and myalgias.   Skin: Negative for color change and rash.   Neurological: Negative for dizziness, weakness and headache.   Hematological: Negative for adenopathy. Does not bruise/bleed easily.   Psychiatric/Behavioral: Positive for sleep disturbance and stress. The patient is nervous/anxious.          No Known Allergies  Current Outpatient Medications on File Prior to Visit   Medication Sig Dispense Refill   • aspirin (aspirin) 81 MG EC tablet aspirin 81 mg tablet,delayed release   Take 1 tablet  "every day by oral route.     • atorvastatin (LIPITOR) 80 MG tablet Take 1 tablet by mouth Daily. 30 tablet 3   • clopidogrel (PLAVIX) 75 MG tablet Take 1 tablet by mouth Daily. 90 tablet 3   • cyclobenzaprine (FLEXERIL) 10 MG tablet TAKE 1 TABLET BY MOUTH 3 (THREE) TIMES A DAY AS NEEDED FOR MUSCLE SPASMS. 45 tablet 0   • lisinopril (PRINIVIL,ZESTRIL) 20 MG tablet Take 1 tablet by mouth Daily. 30 tablet 3   • metoprolol tartrate (LOPRESSOR) 50 MG tablet Take 1 tablet by mouth 2 (Two) Times a Day. 180 tablet 3   • nicotine polacrilex (NICORETTE) 2 MG gum Chew 1 each As Needed for Smoking Cessation (may use 1 piece every 1-2 hours. Max of 24 pieces in 24 hours.). 220 each 0     No current facility-administered medications on file prior to visit.         The following portions of the patient's history were reviewed and updated as appropriate: allergies, current medications, past family history, past medical history, past social history, past surgical history and problem list and are available for review within electronic record    Objective     Result Review :                    Vital Signs:   /86   Pulse 61   Temp 97.3 °F (36.3 °C) (Infrared)   Resp 16   Ht 180.3 cm (70.98\")   Wt 97.2 kg (214 lb 3.2 oz)   SpO2 100%   BMI 29.89 kg/m²         Physical Exam  Vitals and nursing note reviewed.   Constitutional:       Appearance: Normal appearance. He is well-developed.   HENT:      Head: Normocephalic and atraumatic.      Mouth/Throat:      Dentition: Abnormal dentition. Dental tenderness and dental abscesses present.     Eyes:      Conjunctiva/sclera: Conjunctivae normal.      Pupils: Pupils are equal, round, and reactive to light.   Neck:      Thyroid: No thyroid mass or thyromegaly.   Cardiovascular:      Rate and Rhythm: Normal rate and regular rhythm.      Heart sounds: Normal heart sounds.   Pulmonary:      Effort: Pulmonary effort is normal. No respiratory distress.      Breath sounds: Normal breath " sounds.   Abdominal:      General: Bowel sounds are normal. There is no distension.      Palpations: Abdomen is soft.      Tenderness: There is no abdominal tenderness.   Musculoskeletal:      Cervical back: Normal range of motion.   Skin:     General: Skin is warm and dry.   Neurological:      Mental Status: He is alert and oriented to person, place, and time.   Psychiatric:         Attention and Perception: He is attentive.         Mood and Affect: Mood is anxious. Mood is not depressed. Affect is not angry or inappropriate.         Speech: Speech normal.         Behavior: Behavior normal.         Thought Content: Thought content normal.         Judgment: Judgment normal.                 Assessment and Plan      Diagnoses and all orders for this visit:    1. Anxiety (Primary)  -     escitalopram (Lexapro) 10 MG tablet; Take 1 tablet by mouth Daily.  Dispense: 30 tablet; Refill: 3  Anxiety is chronic and uncontrolled.  He has been inconsistent with taking the Lexapro.  We will have him consistently take his Lexapro 5 mg every day for the next 2 weeks then he can increase to 10 mg daily after that.  Discussed the importance of consistency with medications.  Encourage stress reduction relaxation.  We will reassess in the next 2 months or sooner if symptoms are worsening in the interim.  2. Dental abscess  -     amoxicillin (AMOXIL) 500 MG capsule; Take 1 capsule by mouth 3 (Three) Times a Day for 3 days.  Dispense: 9 capsule; Refill: 0  Amoxicillin as directed for dental abscess.  Encourage patient to make an appointment with his dental provider as well.  Follow-up with me as needed regarding abscess tooth      Follow Up     Patient was given instructions and counseling regarding his condition or for health maintenance advice. Please see specific information pulled into the AVS if appropriate.   Any new medication's adverse effects have been discussed in detail with patient  Patient was encouraged to keep me informed  of any acute changes, lack of improvement, or any new concerning symptoms.    Return in about 2 months (around 10/24/2021) for chronic condition follow up.          * Please note that portions of this note were completed with a voice recognition program. Efforts were made to edit the dictation but occasionally words are erroneously transcribed.

## 2021-08-25 DIAGNOSIS — F41.9 ANXIETY: ICD-10-CM

## 2021-08-25 RX ORDER — ESCITALOPRAM OXALATE 5 MG/1
TABLET ORAL
Qty: 30 TABLET | Refills: 1 | OUTPATIENT
Start: 2021-08-25

## 2021-10-26 ENCOUNTER — OFFICE VISIT (OUTPATIENT)
Dept: INTERNAL MEDICINE | Facility: CLINIC | Age: 51
End: 2021-10-26

## 2021-10-26 VITALS
WEIGHT: 206.8 LBS | BODY MASS INDEX: 28.95 KG/M2 | DIASTOLIC BLOOD PRESSURE: 86 MMHG | SYSTOLIC BLOOD PRESSURE: 142 MMHG | RESPIRATION RATE: 16 BRPM | HEART RATE: 57 BPM | HEIGHT: 71 IN | TEMPERATURE: 97.5 F | OXYGEN SATURATION: 99 %

## 2021-10-26 DIAGNOSIS — I10 ESSENTIAL HYPERTENSION: Primary | Chronic | ICD-10-CM

## 2021-10-26 DIAGNOSIS — F41.9 ANXIETY: ICD-10-CM

## 2021-10-26 DIAGNOSIS — E78.2 MIXED HYPERLIPIDEMIA: Chronic | ICD-10-CM

## 2021-10-26 DIAGNOSIS — Z72.0 TOBACCO USER: Chronic | ICD-10-CM

## 2021-10-26 PROCEDURE — 99214 OFFICE O/P EST MOD 30 MIN: CPT | Performed by: NURSE PRACTITIONER

## 2021-10-26 RX ORDER — LISINOPRIL 40 MG/1
40 TABLET ORAL DAILY
Qty: 90 TABLET | Refills: 1 | Status: SHIPPED | OUTPATIENT
Start: 2021-10-26 | End: 2022-04-21

## 2021-10-26 NOTE — PROGRESS NOTES
Denny Hutchins presents to [facility] for     Chief Complaint  Chief Complaint   Patient presents with   • Anxiety   • Hypertension   • Hyperlipidemia         Subjective        History of Present Illness    Anxiety- chronic. He is not sure if the lexapro is helping as he still has some slightly anxious feeling at times and has a bit of stress but feels like this is ok. Has some days that are better than others. It depends on who he has to deal with at his work.     Hyperlipemia- chronic. On statin. He never returned for his labs from prior visit. Last LDL was slightly elevated at 107.   Diet unhealthy. No formal exercise.     HTN- chronic.   On lisinopril and metoprolol.   He does not check BP at home  Denies headaches, dizziness, visual disturbances, palpitations, chest pain, dyspnea, TIA or CVA symptoms, leg pain/claudication symptoms, and edema.     Still smoking cigarettes. Not really wanting to quit yet    Review of Systems   Constitutional: Negative for fatigue, fever and unexpected weight loss.   Eyes: Negative for blurred vision, double vision, pain and visual disturbance.   Respiratory: Negative for cough, chest tightness, shortness of breath and wheezing.    Cardiovascular: Negative for chest pain, palpitations and leg swelling.   Gastrointestinal: Negative for abdominal pain, constipation, diarrhea, nausea and vomiting.   Genitourinary: Negative for difficulty urinating, frequency and urgency.   Musculoskeletal: Negative for arthralgias and myalgias.   Skin: Negative for color change and rash.   Neurological: Negative for dizziness, weakness, light-headedness, headache and confusion.   Hematological: Negative for adenopathy. Does not bruise/bleed easily.   Psychiatric/Behavioral: Negative for decreased concentration, self-injury, sleep disturbance, suicidal ideas, depressed mood and stress. The patient is nervous/anxious.          No Known Allergies  Current  "Outpatient Medications on File Prior to Visit   Medication Sig Dispense Refill   • aspirin (aspirin) 81 MG EC tablet aspirin 81 mg tablet,delayed release   Take 1 tablet every day by oral route.     • atorvastatin (LIPITOR) 80 MG tablet Take 1 tablet by mouth Daily. 30 tablet 3   • clopidogrel (PLAVIX) 75 MG tablet Take 1 tablet by mouth Daily. 90 tablet 3   • escitalopram (Lexapro) 10 MG tablet Take 1 tablet by mouth Daily. 30 tablet 3   • metoprolol tartrate (LOPRESSOR) 50 MG tablet Take 1 tablet by mouth 2 (Two) Times a Day. 180 tablet 3   • nicotine polacrilex (NICORETTE) 2 MG gum Chew 1 each As Needed for Smoking Cessation (may use 1 piece every 1-2 hours. Max of 24 pieces in 24 hours.). 220 each 0   • [DISCONTINUED] lisinopril (PRINIVIL,ZESTRIL) 20 MG tablet Take 1 tablet by mouth Daily. 30 tablet 3   • cyclobenzaprine (FLEXERIL) 10 MG tablet TAKE 1 TABLET BY MOUTH 3 (THREE) TIMES A DAY AS NEEDED FOR MUSCLE SPASMS. 45 tablet 0     No current facility-administered medications on file prior to visit.         The following portions of the patient's history were reviewed and updated as appropriate: allergies, current medications, past family history, past medical history, past social history, past surgical history and problem list and are available for review within electronic record    Objective     Result Review :                    Vital Signs:   /86   Pulse 57   Temp 97.5 °F (36.4 °C) (Infrared)   Resp 16   Ht 180.3 cm (70.98\")   Wt 93.8 kg (206 lb 12.8 oz)   SpO2 99%   BMI 28.86 kg/m²         Physical Exam  Vitals and nursing note reviewed.   Constitutional:       General: He is not in acute distress.     Appearance: Normal appearance. He is well-developed. He is not diaphoretic.   HENT:      Head: Normocephalic and atraumatic.   Eyes:      Conjunctiva/sclera: Conjunctivae normal.      Pupils: Pupils are equal, round, and reactive to light.   Neck:      Vascular: No JVD.   Cardiovascular:      " Rate and Rhythm: Normal rate and regular rhythm.      Heart sounds: Normal heart sounds. No murmur heard.      Pulmonary:      Effort: Pulmonary effort is normal. No respiratory distress.      Breath sounds: Normal breath sounds.   Chest:      Chest wall: No tenderness.   Abdominal:      General: Bowel sounds are normal. There is no distension.      Palpations: Abdomen is soft.      Tenderness: There is no abdominal tenderness.   Musculoskeletal:      Cervical back: Normal range of motion.   Skin:     General: Skin is warm and dry.      Coloration: Skin is not pale.      Findings: No erythema.   Neurological:      Mental Status: He is alert and oriented to person, place, and time.   Psychiatric:         Speech: Speech normal.         Behavior: Behavior normal.         Thought Content: Thought content normal.         Judgment: Judgment normal.                 Assessment and Plan      Diagnoses and all orders for this visit:    1. Essential hypertension (Primary)  -     lisinopril (PRINIVIL,ZESTRIL) 40 MG tablet; Take 1 tablet by mouth Daily.  Dispense: 90 tablet; Refill: 1  Uncontrolled. Cont metoprolol and increase lisinopril.   Goal BP < 130/80.   Pt to monitor BP at home and bring to next appt.   2. Anxiety  Stable . Continue lexapro at current dose.   3. Mixed hyperlipidemia  Check lipids. Cont statin.     4. Tobacco user  He is not ready to quit-cessation encouraged.       Follow Up     Patient was given instructions and counseling regarding his condition or for health maintenance advice. Please see specific information pulled into the AVS if appropriate.   Any new medication's adverse effects have been discussed in detail with patient  Patient was encouraged to keep me informed of any acute changes, lack of improvement, or any new concerning symptoms.    Return in about 3 months (around 1/26/2022) for chronic condition follow up.          Dictated Utilizing Dragon Dictation   Please note that portions of this  note were completed with a voice recognition program.   Part of this note may be an electronic transcription/translation of spoken language to printed text using the Dragon Dictation System.

## 2021-11-08 ENCOUNTER — LAB (OUTPATIENT)
Dept: LAB | Facility: HOSPITAL | Age: 51
End: 2021-11-08

## 2021-11-08 DIAGNOSIS — I10 ESSENTIAL HYPERTENSION: ICD-10-CM

## 2021-11-08 DIAGNOSIS — Z11.59 ENCOUNTER FOR HEPATITIS C SCREENING TEST FOR LOW RISK PATIENT: ICD-10-CM

## 2021-11-08 DIAGNOSIS — E78.2 MIXED HYPERLIPIDEMIA: ICD-10-CM

## 2021-11-08 LAB
ALBUMIN SERPL-MCNC: 4.8 G/DL (ref 3.5–5.2)
ALBUMIN/GLOB SERPL: 1.5 G/DL
ALP SERPL-CCNC: 75 U/L (ref 39–117)
ALT SERPL W P-5'-P-CCNC: 25 U/L (ref 1–41)
ANION GAP SERPL CALCULATED.3IONS-SCNC: 8.2 MMOL/L (ref 5–15)
AST SERPL-CCNC: 26 U/L (ref 1–40)
BILIRUB SERPL-MCNC: 0.4 MG/DL (ref 0–1.2)
BUN SERPL-MCNC: 12 MG/DL (ref 6–20)
BUN/CREAT SERPL: 15.8 (ref 7–25)
CALCIUM SPEC-SCNC: 9.6 MG/DL (ref 8.6–10.5)
CHLORIDE SERPL-SCNC: 97 MMOL/L (ref 98–107)
CHOLEST SERPL-MCNC: 136 MG/DL (ref 0–200)
CO2 SERPL-SCNC: 25.8 MMOL/L (ref 22–29)
CREAT SERPL-MCNC: 0.76 MG/DL (ref 0.76–1.27)
GFR SERPL CREATININE-BSD FRML MDRD: 109 ML/MIN/1.73
GLOBULIN UR ELPH-MCNC: 3.2 GM/DL
GLUCOSE SERPL-MCNC: 99 MG/DL (ref 65–99)
HDLC SERPL-MCNC: 46 MG/DL (ref 40–60)
LDLC SERPL CALC-MCNC: 68 MG/DL (ref 0–100)
LDLC/HDLC SERPL: 1.43 {RATIO}
POTASSIUM SERPL-SCNC: 4.7 MMOL/L (ref 3.5–5.2)
PROT SERPL-MCNC: 8 G/DL (ref 6–8.5)
SODIUM SERPL-SCNC: 131 MMOL/L (ref 136–145)
TRIGL SERPL-MCNC: 122 MG/DL (ref 0–150)
VLDLC SERPL-MCNC: 22 MG/DL (ref 5–40)

## 2021-11-08 PROCEDURE — 86803 HEPATITIS C AB TEST: CPT | Performed by: NURSE PRACTITIONER

## 2021-11-08 PROCEDURE — 80061 LIPID PANEL: CPT | Performed by: NURSE PRACTITIONER

## 2021-11-08 PROCEDURE — 36415 COLL VENOUS BLD VENIPUNCTURE: CPT

## 2021-11-08 PROCEDURE — 80053 COMPREHEN METABOLIC PANEL: CPT | Performed by: NURSE PRACTITIONER

## 2021-11-09 LAB
HCV AB S/CO SERPL IA: 0.1 S/CO RATIO (ref 0–0.9)
HCV AB SERPL QL IA: NORMAL

## 2021-11-16 ENCOUNTER — TELEPHONE (OUTPATIENT)
Dept: INTERNAL MEDICINE | Facility: CLINIC | Age: 51
End: 2021-11-16

## 2021-11-16 NOTE — TELEPHONE ENCOUNTER
[facility] for the patient to return our call, office number was provided      HUB TO READ: Please provide the patient with the below provider comments.     ----- Message from JW Pack sent at 11/15/2021  5:22 PM EST -----  Please let him know his labs are stable. The sodium is a touch low so he could  increase his dietary in take of salt as it looks like his sodium is often slightly low.   Can recheck at FU or sooner if he starts to develop : dizziness, fatigue, increased thirst, headaches, decreased urine output, confusion or nausea.

## 2021-11-21 DIAGNOSIS — E78.2 MIXED HYPERLIPIDEMIA: ICD-10-CM

## 2021-11-22 RX ORDER — ATORVASTATIN CALCIUM 80 MG/1
TABLET, FILM COATED ORAL
Qty: 30 TABLET | Refills: 3 | Status: SHIPPED | OUTPATIENT
Start: 2021-11-22 | End: 2022-04-11

## 2021-11-22 NOTE — TELEPHONE ENCOUNTER
Rx Refill Note  Requested Prescriptions     Pending Prescriptions Disp Refills   • atorvastatin (LIPITOR) 80 MG tablet [Pharmacy Med Name: ATORVASTATIN 80 MG TABLET] 30 tablet 3     Sig: TAKE 1 TABLET BY MOUTH EVERY DAY      Last office visit with prescribing clinician: 10/26/2021      Next office visit with prescribing clinician: 1/26/2022     Last Fill Date: 07/12/2021  Quantity: 30  Refills: 3    April MARIE Smith MA  11/22/21, 07:51 EST

## 2022-01-04 DIAGNOSIS — F41.9 ANXIETY: ICD-10-CM

## 2022-01-04 RX ORDER — ESCITALOPRAM OXALATE 10 MG/1
TABLET ORAL
Qty: 30 TABLET | Refills: 0 | Status: SHIPPED | OUTPATIENT
Start: 2022-01-04 | End: 2022-01-27

## 2022-01-04 NOTE — TELEPHONE ENCOUNTER
Rx Refill Note  Requested Prescriptions     Pending Prescriptions Disp Refills   • escitalopram (LEXAPRO) 10 MG tablet [Pharmacy Med Name: ESCITALOPRAM 10 MG TABLET] 30 tablet 3     Sig: TAKE 1 TABLET BY MOUTH EVERY DAY      Last office visit with prescribing clinician: 10/26/2021      Next office visit with prescribing clinician: 1/26/2022     Last Fill Date: 08/24/2021  Quantity: 30  Refills: 3    April MARIE Smith MA  01/04/22, 07:44 EST     Medication refilled to get patient to his next follow up appointment.

## 2022-01-17 DIAGNOSIS — E78.2 MIXED HYPERLIPIDEMIA: ICD-10-CM

## 2022-01-17 RX ORDER — ATORVASTATIN CALCIUM 80 MG/1
80 TABLET, FILM COATED ORAL DAILY
Qty: 30 TABLET | Refills: 3 | Status: CANCELLED | OUTPATIENT
Start: 2022-01-17

## 2022-01-17 NOTE — TELEPHONE ENCOUNTER
Spoke with the patient's pharmacy and they are getting his medication refill ready for him to . LVM notifying the patient of this, office number was provided

## 2022-01-17 NOTE — TELEPHONE ENCOUNTER
Caller: Denny Hutchins    Relationship: Self    Best call back number: [phone]    Requested Prescriptions:   Requested Prescriptions     Pending Prescriptions Disp Refills   • atorvastatin (LIPITOR) 80 MG tablet 30 tablet 3     Sig: Take 1 tablet by mouth Daily.        Pharmacy where request should be sent: [facility][id number] - [address] [phone] PH - [phone]      Additional details provided by patient: WENT TO FILL AND THE PHARMACY TOLD HIM IT COULD NOT BE FILLED TO CALL DOCTOR OFFICE.    Does the patient have less than a 3 day supply:  [x] Yes  [] No    Michael Bernabe Rep   01/17/22 09:56 EST

## 2022-01-25 ENCOUNTER — LAB (OUTPATIENT)
Dept: LAB | Facility: HOSPITAL | Age: 52
End: 2022-01-25

## 2022-01-25 ENCOUNTER — OFFICE VISIT (OUTPATIENT)
Dept: INTERNAL MEDICINE | Facility: CLINIC | Age: 52
End: 2022-01-25

## 2022-01-25 VITALS
OXYGEN SATURATION: 98 % | RESPIRATION RATE: 18 BRPM | BODY MASS INDEX: 29.03 KG/M2 | HEIGHT: 71 IN | SYSTOLIC BLOOD PRESSURE: 122 MMHG | DIASTOLIC BLOOD PRESSURE: 88 MMHG | HEART RATE: 67 BPM | TEMPERATURE: 97.3 F | WEIGHT: 207.4 LBS

## 2022-01-25 DIAGNOSIS — I10 ESSENTIAL HYPERTENSION: Chronic | ICD-10-CM

## 2022-01-25 DIAGNOSIS — M25.512 ACUTE PAIN OF LEFT SHOULDER: ICD-10-CM

## 2022-01-25 DIAGNOSIS — I10 ESSENTIAL HYPERTENSION: Primary | Chronic | ICD-10-CM

## 2022-01-25 PROCEDURE — 99214 OFFICE O/P EST MOD 30 MIN: CPT | Performed by: NURSE PRACTITIONER

## 2022-01-25 PROCEDURE — 80048 BASIC METABOLIC PNL TOTAL CA: CPT | Performed by: NURSE PRACTITIONER

## 2022-01-25 RX ORDER — CYCLOBENZAPRINE HCL 10 MG
10 TABLET ORAL 3 TIMES DAILY PRN
Qty: 45 TABLET | Refills: 0 | Status: SHIPPED | OUTPATIENT
Start: 2022-01-25 | End: 2022-04-25 | Stop reason: SDUPTHER

## 2022-01-25 NOTE — PROGRESS NOTES
Subjective   Denny Hutchins is a 51 y.o. male    Chief Complaint   Patient presents with   • Hypertension     3 month f/u   • Shoulder Injury     feels like stabbing pain, left shoulder, x 3 days, lifting furniture, office furniture. otc ibuprofen and tylenol which doesn't help, his boss had given him a lortab and states it helped him sleep last night     History of Present Illness     HTN -chronic and stable on lisinopril 40 mg 1 p.o. daily (which was recently increased) and metoprolol 50 mg twice daily.  Blood pressure within normal limits today.  Patient denies medication side effects.    Left shoulder - pt states that he was moving furniture 3-4 days ago and injured his left shoulder.  Describes as a dull ache.  Range of motion is decreased.  He has tried OTC ibuprofen and Tylenol which did not do much for him.  Someone did give him a Lortab and it helped him sleep through the pain.  Denies previous problems with the shoulder.    The following portions of the patient's history were reviewed and updated as appropriate: allergies, current medications, past family history, past medical history, past social history, past surgical history and problem list.    Current Outpatient Medications:   •  aspirin (aspirin) 81 MG EC tablet, aspirin 81 mg tablet,delayed release  Take 1 tablet every day by oral route., Disp: , Rfl:   •  atorvastatin (LIPITOR) 80 MG tablet, TAKE 1 TABLET BY MOUTH EVERY DAY, Disp: 30 tablet, Rfl: 3  •  clopidogrel (PLAVIX) 75 MG tablet, Take 1 tablet by mouth Daily., Disp: 90 tablet, Rfl: 3  •  cyclobenzaprine (FLEXERIL) 10 MG tablet, Take 1 tablet by mouth 3 (Three) Times a Day As Needed for Muscle Spasms., Disp: 45 tablet, Rfl: 0  •  lisinopril (PRINIVIL,ZESTRIL) 40 MG tablet, Take 1 tablet by mouth Daily., Disp: 90 tablet, Rfl: 1  •  metoprolol tartrate (LOPRESSOR) 50 MG tablet, Take 1 tablet by mouth 2 (Two) Times a Day., Disp: 180 tablet, Rfl: 3  •  nicotine polacrilex (NICORETTE) 2 MG  gum, Chew 1 each As Needed for Smoking Cessation (may use 1 piece every 1-2 hours. Max of 24 pieces in 24 hours.)., Disp: 220 each, Rfl: 0  •  Diclofenac Sodium (VOLTAREN) 1 % gel gel, Apply 4 g topically to the appropriate area as directed 4 (Four) Times a Day As Needed (pain)., Disp: 100 g, Rfl: 0  •  escitalopram (LEXAPRO) 10 MG tablet, TAKE 1 TABLET BY MOUTH EVERY DAY, Disp: 30 tablet, Rfl: 0     Review of Systems   Constitutional: Negative for chills, fatigue and fever.   Respiratory: Negative for cough, chest tightness and shortness of breath.    Cardiovascular: Negative for chest pain.   Gastrointestinal: Negative for abdominal pain, diarrhea, nausea and vomiting.   Endocrine: Negative for cold intolerance and heat intolerance.   Musculoskeletal: Negative for arthralgias.        Left shoulder pain   Neurological: Negative for dizziness.       Objective   Physical Exam  Constitutional:       Appearance: He is well-developed.   HENT:      Head: Normocephalic and atraumatic.   Eyes:      Conjunctiva/sclera: Conjunctivae normal.      Pupils: Pupils are equal, round, and reactive to light.   Cardiovascular:      Rate and Rhythm: Normal rate and regular rhythm.      Heart sounds: Normal heart sounds.   Pulmonary:      Effort: Pulmonary effort is normal.      Breath sounds: Normal breath sounds.   Abdominal:      General: Bowel sounds are normal.      Palpations: Abdomen is soft.   Musculoskeletal:      Left shoulder: Tenderness and crepitus present. No swelling, deformity, effusion, laceration or bony tenderness. Decreased range of motion. Decreased strength. Normal pulse.      Cervical back: Normal range of motion.   Skin:     General: Skin is warm and dry.   Neurological:      Mental Status: He is alert and oriented to person, place, and time.      Deep Tendon Reflexes: Reflexes are normal and symmetric.   Psychiatric:         Behavior: Behavior normal.         Thought Content: Thought content normal.          "Judgment: Judgment normal.       Vitals:    01/25/22 1419   BP: 122/88   Cuff Size: Adult   Pulse: 67   Resp: 18   Temp: 97.3 °F (36.3 °C)   TempSrc: Infrared   SpO2: 98%   Weight: 94.1 kg (207 lb 6.4 oz)   Height: 180.3 cm (70.98\")         Assessment/Plan   Diagnoses and all orders for this visit:    1. Essential hypertension (Primary)  -     Basic Metabolic Panel; Future    2. Acute pain of left shoulder  -     cyclobenzaprine (FLEXERIL) 10 MG tablet; Take 1 tablet by mouth 3 (Three) Times a Day As Needed for Muscle Spasms.  Dispense: 45 tablet; Refill: 0  -     Diclofenac Sodium (VOLTAREN) 1 % gel gel; Apply 4 g topically to the appropriate area as directed 4 (Four) Times a Day As Needed (pain).  Dispense: 100 g; Refill: 0  -     XR Shoulder 2+ View Left; Future      BMP sent today due to recent increase in ACE  Sent for x-rays of the left shoulder  Voltaren gel as needed, avoid p.o. NSAIDs due to hypertension  Cyclobenzaprine as needed for muscle spasm/pain  Return in about 3 months (around 4/25/2022) for Annual with MB, collect labs today.           "

## 2022-01-26 ENCOUNTER — HOSPITAL ENCOUNTER (OUTPATIENT)
Dept: GENERAL RADIOLOGY | Facility: HOSPITAL | Age: 52
Discharge: HOME | End: 2022-01-26
Admitting: NURSE PRACTITIONER

## 2022-01-26 DIAGNOSIS — M25.512 ACUTE PAIN OF LEFT SHOULDER: ICD-10-CM

## 2022-01-26 LAB
ANION GAP SERPL CALCULATED.3IONS-SCNC: 13.9 MMOL/L (ref 5–15)
BUN SERPL-MCNC: 12 MG/DL (ref 6–20)
BUN/CREAT SERPL: 8.9 (ref 7–25)
CALCIUM SPEC-SCNC: 9.4 MG/DL (ref 8.6–10.5)
CHLORIDE SERPL-SCNC: 97 MMOL/L (ref 98–107)
CO2 SERPL-SCNC: 24.1 MMOL/L (ref 22–29)
CREAT SERPL-MCNC: 1.35 MG/DL (ref 0.76–1.27)
GFR SERPL CREATININE-BSD FRML MDRD: 56 ML/MIN/1.73
GLUCOSE SERPL-MCNC: 77 MG/DL (ref 65–99)
POTASSIUM SERPL-SCNC: 4 MMOL/L (ref 3.5–5.2)
SODIUM SERPL-SCNC: 135 MMOL/L (ref 136–145)

## 2022-01-26 PROCEDURE — 73030 X-RAY EXAM OF SHOULDER: CPT

## 2022-01-27 ENCOUNTER — TELEPHONE (OUTPATIENT)
Dept: INTERNAL MEDICINE | Facility: CLINIC | Age: 52
End: 2022-01-27

## 2022-01-27 DIAGNOSIS — F41.9 ANXIETY: ICD-10-CM

## 2022-01-27 DIAGNOSIS — M25.512 LEFT SHOULDER PAIN, UNSPECIFIED CHRONICITY: Primary | ICD-10-CM

## 2022-01-27 RX ORDER — ESCITALOPRAM OXALATE 10 MG/1
TABLET ORAL
Qty: 30 TABLET | Refills: 0 | Status: SHIPPED | OUTPATIENT
Start: 2022-01-27 | End: 2022-02-28

## 2022-01-27 NOTE — TELEPHONE ENCOUNTER
Caller: Lyn Hutchins    Relationship: Self    Best call back number: [phone]    Caller requesting test results: LYN    What test was performed: SHOULDER XRAY    When was the test performed: [id number]    Where was the test performed:  [facility]    Additional notes: LYN IS CALLING FOR THE RESULTS

## 2022-02-08 DIAGNOSIS — N28.9 DECREASED RENAL FUNCTION: Primary | ICD-10-CM

## 2022-02-09 ENCOUNTER — TELEPHONE (OUTPATIENT)
Dept: INTERNAL MEDICINE | Facility: CLINIC | Age: 52
End: 2022-02-09

## 2022-02-09 NOTE — TELEPHONE ENCOUNTER
----- Message from JW Marion sent at 2/8/2022  3:49 PM EST -----  Renal function has decreased slightly since last check.  This could be due to the increase of his BP med, or that he was taking a great deal of NSAIDs at the time of the lab drawn.  I would recommend increasing water intake and repeating in 2 weeks.  Order is in.

## 2022-02-09 NOTE — TELEPHONE ENCOUNTER
LVM TO RTN CALL TO DISCUSS    HUB TO READ:    Renal function has decreased slightly since last check.  This could be due to the increase of his BP med, or that he was taking a great deal of NSAIDs at the time of the lab drawn.  I would recommend increasing water intake and repeating in 2 weeks.  Order is in.

## 2022-02-17 DIAGNOSIS — M25.512 ACUTE PAIN OF LEFT SHOULDER: ICD-10-CM

## 2022-02-28 DIAGNOSIS — F41.9 ANXIETY: ICD-10-CM

## 2022-02-28 RX ORDER — ESCITALOPRAM OXALATE 10 MG/1
TABLET ORAL
Qty: 30 TABLET | Refills: 0 | Status: SHIPPED | OUTPATIENT
Start: 2022-02-28 | End: 2022-03-28

## 2022-03-14 DIAGNOSIS — M25.512 ACUTE PAIN OF LEFT SHOULDER: ICD-10-CM

## 2022-03-26 DIAGNOSIS — F41.9 ANXIETY: ICD-10-CM

## 2022-03-28 RX ORDER — ESCITALOPRAM OXALATE 10 MG/1
TABLET ORAL
Qty: 30 TABLET | Refills: 0 | Status: SHIPPED | OUTPATIENT
Start: 2022-03-28 | End: 2022-04-25 | Stop reason: SDUPTHER

## 2022-03-28 NOTE — TELEPHONE ENCOUNTER
Rx Refill Note  Requested Prescriptions     Pending Prescriptions Disp Refills   • escitalopram (LEXAPRO) 10 MG tablet [Pharmacy Med Name: ESCITALOPRAM 10 MG TABLET] 30 tablet 0     Sig: TAKE 1 TABLET BY MOUTH EVERY DAY      Last filled:  Last office visit with prescribing clinician: 10/26/2021      Next office visit with prescribing clinician: 4/25/2022 April MARIE Smith MA  03/28/22, 09:01 EDT

## 2022-04-11 DIAGNOSIS — E78.2 MIXED HYPERLIPIDEMIA: ICD-10-CM

## 2022-04-11 RX ORDER — ATORVASTATIN CALCIUM 80 MG/1
TABLET, FILM COATED ORAL
Qty: 14 TABLET | Refills: 0 | Status: SHIPPED | OUTPATIENT
Start: 2022-04-11 | End: 2022-04-25 | Stop reason: SDUPTHER

## 2022-04-11 NOTE — TELEPHONE ENCOUNTER
Rx Refill Note  Requested Prescriptions     Pending Prescriptions Disp Refills   • atorvastatin (LIPITOR) 80 MG tablet [Pharmacy Med Name: ATORVASTATIN 80 MG TABLET] 30 tablet 3     Sig: TAKE 1 TABLET BY MOUTH EVERY DAY      Last filled:  Last office visit with prescribing clinician: 10/26/2021      Next office visit with prescribing clinician: 4/25/2022 April MARIE Smith MA  04/11/22, 07:43 EDT

## 2022-04-21 DIAGNOSIS — I10 ESSENTIAL HYPERTENSION: Chronic | ICD-10-CM

## 2022-04-21 RX ORDER — LISINOPRIL 40 MG/1
TABLET ORAL
Qty: 90 TABLET | Refills: 0 | Status: SHIPPED | OUTPATIENT
Start: 2022-04-21 | End: 2022-04-25 | Stop reason: SDUPTHER

## 2022-04-21 NOTE — TELEPHONE ENCOUNTER
Rx Refill Note  Requested Prescriptions     Pending Prescriptions Disp Refills   • lisinopril (PRINIVIL,ZESTRIL) 40 MG tablet [Pharmacy Med Name: LISINOPRIL 40 MG TABLET] 90 tablet 1     Sig: TAKE 1 TABLET BY MOUTH EVERY DAY      Last filled: 10/26/2021  Last office visit with prescribing clinician: 1/25/22 - Bere Bull    Next office visit with prescribing clinician: 4/25/2022 April MARIE Smith MA  04/21/22, 07:49 EDT

## 2022-04-25 ENCOUNTER — OFFICE VISIT (OUTPATIENT)
Dept: INTERNAL MEDICINE | Facility: CLINIC | Age: 52
End: 2022-04-25

## 2022-04-25 ENCOUNTER — LAB (OUTPATIENT)
Dept: LAB | Facility: HOSPITAL | Age: 52
End: 2022-04-25

## 2022-04-25 VITALS
BODY MASS INDEX: 31.1 KG/M2 | SYSTOLIC BLOOD PRESSURE: 118 MMHG | HEART RATE: 61 BPM | HEIGHT: 69 IN | TEMPERATURE: 98.2 F | RESPIRATION RATE: 18 BRPM | OXYGEN SATURATION: 97 % | DIASTOLIC BLOOD PRESSURE: 76 MMHG | WEIGHT: 210 LBS

## 2022-04-25 DIAGNOSIS — Z12.5 PROSTATE CANCER SCREENING: ICD-10-CM

## 2022-04-25 DIAGNOSIS — E78.2 MIXED HYPERLIPIDEMIA: ICD-10-CM

## 2022-04-25 DIAGNOSIS — Z72.0 TOBACCO USER: ICD-10-CM

## 2022-04-25 DIAGNOSIS — L98.9 SKIN LESION OF LEFT ARM: ICD-10-CM

## 2022-04-25 DIAGNOSIS — Z12.11 COLON CANCER SCREENING: ICD-10-CM

## 2022-04-25 DIAGNOSIS — Z00.00 ANNUAL PHYSICAL EXAM: Primary | ICD-10-CM

## 2022-04-25 DIAGNOSIS — N28.9 DECREASED RENAL FUNCTION: ICD-10-CM

## 2022-04-25 DIAGNOSIS — M19.012 PRIMARY OSTEOARTHRITIS OF LEFT SHOULDER: ICD-10-CM

## 2022-04-25 DIAGNOSIS — I10 ESSENTIAL HYPERTENSION: ICD-10-CM

## 2022-04-25 DIAGNOSIS — Z00.00 ANNUAL PHYSICAL EXAM: ICD-10-CM

## 2022-04-25 DIAGNOSIS — F41.9 ANXIETY: ICD-10-CM

## 2022-04-25 DIAGNOSIS — Z23 NEED FOR TDAP VACCINATION: ICD-10-CM

## 2022-04-25 LAB
ALBUMIN SERPL-MCNC: 4.9 G/DL (ref 3.5–5.2)
ALBUMIN/GLOB SERPL: 1.6 G/DL
ALP SERPL-CCNC: 77 U/L (ref 39–117)
ALT SERPL W P-5'-P-CCNC: 35 U/L (ref 1–41)
ANION GAP SERPL CALCULATED.3IONS-SCNC: 14.1 MMOL/L (ref 5–15)
AST SERPL-CCNC: 32 U/L (ref 1–40)
BILIRUB SERPL-MCNC: 0.6 MG/DL (ref 0–1.2)
BUN SERPL-MCNC: 19 MG/DL (ref 6–20)
BUN/CREAT SERPL: 17.4 (ref 7–25)
CALCIUM SPEC-SCNC: 9.5 MG/DL (ref 8.6–10.5)
CHLORIDE SERPL-SCNC: 99 MMOL/L (ref 98–107)
CHOLEST SERPL-MCNC: 121 MG/DL (ref 0–200)
CO2 SERPL-SCNC: 21.9 MMOL/L (ref 22–29)
CREAT SERPL-MCNC: 1.09 MG/DL (ref 0.76–1.27)
DEPRECATED RDW RBC AUTO: 43.1 FL (ref 37–54)
DEVELOPER EXPIRATION DATE: NORMAL
DEVELOPER LOT NUMBER: NORMAL
EGFRCR SERPLBLD CKD-EPI 2021: 82.7 ML/MIN/1.73
ERYTHROCYTE [DISTWIDTH] IN BLOOD BY AUTOMATED COUNT: 12 % (ref 12.3–15.4)
EXPIRATION DATE: NORMAL
FECAL OCCULT BLOOD SCREEN, POC: NEGATIVE
GLOBULIN UR ELPH-MCNC: 3.1 GM/DL
GLUCOSE SERPL-MCNC: 96 MG/DL (ref 65–99)
HCT VFR BLD AUTO: 42.6 % (ref 37.5–51)
HDLC SERPL-MCNC: 34 MG/DL (ref 40–60)
HGB BLD-MCNC: 14.9 G/DL (ref 13–17.7)
LDLC SERPL CALC-MCNC: 46 MG/DL (ref 0–100)
LDLC/HDLC SERPL: 1.01 {RATIO}
Lab: NORMAL
MCH RBC QN AUTO: 33.9 PG (ref 26.6–33)
MCHC RBC AUTO-ENTMCNC: 35 G/DL (ref 31.5–35.7)
MCV RBC AUTO: 96.8 FL (ref 79–97)
NEGATIVE CONTROL: NEGATIVE
PLATELET # BLD AUTO: 243 10*3/MM3 (ref 140–450)
PMV BLD AUTO: 9.2 FL (ref 6–12)
POSITIVE CONTROL: POSITIVE
POTASSIUM SERPL-SCNC: 4.9 MMOL/L (ref 3.5–5.2)
PROT SERPL-MCNC: 8 G/DL (ref 6–8.5)
PSA SERPL-MCNC: 0.53 NG/ML (ref 0–4)
RBC # BLD AUTO: 4.4 10*6/MM3 (ref 4.14–5.8)
SODIUM SERPL-SCNC: 135 MMOL/L (ref 136–145)
TRIGL SERPL-MCNC: 264 MG/DL (ref 0–150)
VLDLC SERPL-MCNC: 41 MG/DL (ref 5–40)
WBC NRBC COR # BLD: 7.48 10*3/MM3 (ref 3.4–10.8)

## 2022-04-25 PROCEDURE — 82270 OCCULT BLOOD FECES: CPT | Performed by: NURSE PRACTITIONER

## 2022-04-25 PROCEDURE — 99396 PREV VISIT EST AGE 40-64: CPT | Performed by: NURSE PRACTITIONER

## 2022-04-25 PROCEDURE — 90471 IMMUNIZATION ADMIN: CPT | Performed by: NURSE PRACTITIONER

## 2022-04-25 PROCEDURE — 80061 LIPID PANEL: CPT | Performed by: NURSE PRACTITIONER

## 2022-04-25 PROCEDURE — 80053 COMPREHEN METABOLIC PANEL: CPT | Performed by: NURSE PRACTITIONER

## 2022-04-25 PROCEDURE — 85027 COMPLETE CBC AUTOMATED: CPT | Performed by: NURSE PRACTITIONER

## 2022-04-25 PROCEDURE — G0103 PSA SCREENING: HCPCS | Performed by: NURSE PRACTITIONER

## 2022-04-25 PROCEDURE — 90715 TDAP VACCINE 7 YRS/> IM: CPT | Performed by: NURSE PRACTITIONER

## 2022-04-25 RX ORDER — ATORVASTATIN CALCIUM 80 MG/1
80 TABLET, FILM COATED ORAL DAILY
Qty: 90 TABLET | Refills: 1 | Status: SHIPPED | OUTPATIENT
Start: 2022-04-25 | End: 2022-11-04

## 2022-04-25 RX ORDER — CYCLOBENZAPRINE HCL 10 MG
10 TABLET ORAL 3 TIMES DAILY PRN
Qty: 45 TABLET | Refills: 0 | Status: SHIPPED | OUTPATIENT
Start: 2022-04-25 | End: 2022-05-13

## 2022-04-25 RX ORDER — LISINOPRIL 40 MG/1
40 TABLET ORAL DAILY
Qty: 90 TABLET | Refills: 0 | Status: SHIPPED | OUTPATIENT
Start: 2022-04-25 | End: 2022-05-20 | Stop reason: ALTCHOICE

## 2022-04-25 RX ORDER — ESCITALOPRAM OXALATE 10 MG/1
10 TABLET ORAL DAILY
Qty: 90 TABLET | Refills: 1 | Status: SHIPPED | OUTPATIENT
Start: 2022-04-25 | End: 2022-11-04

## 2022-04-26 ENCOUNTER — TELEPHONE (OUTPATIENT)
Dept: INTERNAL MEDICINE | Facility: CLINIC | Age: 52
End: 2022-04-26

## 2022-04-26 NOTE — TELEPHONE ENCOUNTER
Received a fax from [facility] stating that they need a specification on how many grams to apply for his Diclofenac Gel. Please advise.

## 2022-05-03 ENCOUNTER — TELEPHONE (OUTPATIENT)
Dept: INTERNAL MEDICINE | Facility: CLINIC | Age: 52
End: 2022-05-03

## 2022-05-03 NOTE — TELEPHONE ENCOUNTER
Order was received by [place] [facility] on 2022. Order from 2021 has been cancelled. Order from 2020 has .

## 2022-05-13 DIAGNOSIS — M19.012 PRIMARY OSTEOARTHRITIS OF LEFT SHOULDER: ICD-10-CM

## 2022-05-13 RX ORDER — CYCLOBENZAPRINE HCL 10 MG
TABLET ORAL
Qty: 45 TABLET | Refills: 0 | Status: SHIPPED | OUTPATIENT
Start: 2022-05-13 | End: 2022-06-03

## 2022-05-13 NOTE — TELEPHONE ENCOUNTER
Rx Refill Note  Requested Prescriptions     Pending Prescriptions Disp Refills   • cyclobenzaprine (FLEXERIL) 10 MG tablet [Pharmacy Med Name: CYCLOBENZAPRINE 10 MG TABLET] 45 tablet 0     Sig: TAKE 1 TABLET BY MOUTH 3 TIMES A DAY AS NEEDED FOR MUSCLE SPASMS.      Last filled:  Last office visit with prescribing clinician: 4/25/2022      Next office visit with prescribing clinician: 10/25/2022     April MARIE Smith MA  05/13/22, 13:07 EDT     Please advise if refill is appropriate, looks like was provided PRN

## 2022-05-19 NOTE — PROGRESS NOTES
[facility]    Encounter Date: 2022    Patient ID: Denny Hutchins is a 51 y.o. male.  : 1970     PCP: Bere Dowling APRN       Chief Complaint: Coronary artery disease involving native coronary artery of      PROBLEM LIST:  1. Coronary artery disease  a. NSTEMI, 2021  b. LHC, 2021: Subtotal proximal RCA occlusion. 3.5 x 18 mm Resolute [place] stent  c. Echo 2021: Normal LV size.  Mild LVH.  EF 50% with inferobasal akinesia. Abnormal systolic strain pattern. Abnormal RA size.  Dilated right ventricle with abnormal right ventricular function. Mildly dilated aortic root 3.89 cm. Mildly dilated ascending aorta 3.92 cm. Normal valvular morphology.  2. Paroxysmal atrial fibrillation  a. New onset 2021 during hospitalization for MI at [facility].  Started on amiodarone, metoprolol, and Eliquis. Amiodarone and Eliquis discontinued by Dr. Michaels at follow-up.  b. Event monitor, 2021: Monitored for 12 days. Baseline SR with PAC.   3. Dyslipidemia   4. Essential hypertension   5. Tobacco use   6. Needle phobia     History of Present Illness  Patient presents today for a follow-up with a history of coronary artery disease, paroxysmal atrial fibrillation, and cardiac risk factors. Since last visit, Patient has had no interim ER visits, hospitalizations, serious illnesses, or injuries.  He has been stable from a cardiac standpoint.  He denies chest pain, shortness of breath, palpitations, orthopnea, or edema.  He does continue to smoke 1/2-1 pack per day.  He has had no known recurrent episodes of atrial fibrillation.    No Known Allergies      Current Outpatient Medications:   •  aspirin 81 MG EC tablet, aspirin 81 mg tablet,delayed release  Take 1 tablet every day by oral route., Disp: , Rfl:   •  atorvastatin (LIPITOR) 80 MG tablet, Take 1 tablet by mouth Daily., Disp: 90 tablet, Rfl: 1  •  clopidogrel (PLAVIX) 75 MG tablet, Take 1 tablet by mouth  "Daily., Disp: 90 tablet, Rfl: 3  •  cyclobenzaprine (FLEXERIL) 10 MG tablet, TAKE 1 TABLET BY MOUTH 3 TIMES A DAY AS NEEDED FOR MUSCLE SPASMS. (Patient taking differently: As Needed.), Disp: 45 tablet, Rfl: 0  •  escitalopram (LEXAPRO) 10 MG tablet, Take 1 tablet by mouth Daily., Disp: 90 tablet, Rfl: 1  •  lisinopril (PRINIVIL,ZESTRIL) 40 MG tablet, Take 1 tablet by mouth Daily., Disp: 90 tablet, Rfl: 0  •  metoprolol tartrate (LOPRESSOR) 50 MG tablet, Take 1 tablet by mouth 2 (Two) Times a Day., Disp: 180 tablet, Rfl: 3    The following portions of the patient's history were reviewed and updated as appropriate: allergies, current medications, past family history, past medical history, past social history, past surgical history and problem list.    ROS  Review of Systems   Constitution: Negative for chills, fever, fatigue, generalized weakness.   Cardiovascular: Negative for chest pain, dyspnea on exertion, leg swelling, palpitations, orthopnea, and syncope.   Respiratory: Negative for cough, shortness of breath, and wheezing.  HENT: Negative for ear pain, nosebleeds, and tinnitus.  Gastrointestinal: Negative for abdominal pain, constipation, diarrhea, nausea and vomiting.   Genitourinary: No urinary symptoms.  Musculoskeletal: Negative for muscle cramps.  Neurological: Negative for dizziness, headaches, loss of balance, numbness, and symptoms of stroke.  Psychiatric: Normal mental status.     All other systems reviewed and are negative.        Objective:     /82 (BP Location: Right arm, Patient Position: Sitting)   Pulse 74   Ht 175.3 cm (69\")   Wt 97.5 kg (215 lb)   SpO2 96%   BMI 31.75 kg/m²     BP recheck 142/92    Physical Exam  Constitutional: Patient appears well-developed and well-nourished.   HENT: HEENT exam unremarkable.   Neck: Neck supple. No JVD present. No carotid bruits.   Cardiovascular: Normal rate, regular rhythm and normal heart sounds. No murmur heard.   2+ symmetric pulses. "   Pulmonary/Chest: Breath sounds normal. Does not exhibit tenderness.   Abdominal: Abdomen benign.   Musculoskeletal: Does not exhibit edema.   Neurological: Neurological exam unremarkable.   Vitals reviewed.    Data Review:   Lab Results   Component Value Date    GLUCOSE 96 04/25/2022    BUN 19 04/25/2022    CREATININE 1.09 04/25/2022    EGFRIFNONA 56 (L) 01/25/2022    BCR 17.4 04/25/2022    K 4.9 04/25/2022    CO2 21.9 (L) 04/25/2022    CALCIUM 9.5 04/25/2022    ALBUMIN 4.90 04/25/2022    AST 32 04/25/2022    ALT 35 04/25/2022     Lab Results   Component Value Date    CHOL 121 04/25/2022    TRIG 264 (H) 04/25/2022    HDL 34 (L) 04/25/2022    LDL 46 04/25/2022      Lab Results   Component Value Date    WBC 7.48 04/25/2022    RBC 4.40 04/25/2022    HGB 14.9 04/25/2022    HCT 42.6 04/25/2022    MCV 96.8 04/25/2022     04/25/2022           Assessment:      Diagnosis Plan   1. Coronary artery disease involving native coronary artery of native heart without angina pectoris  Stable without current angina.  Continue aspirin, Plavix, and statin therapy.   2. Paroxysmal atrial fibrillation (HCC)   was postprocedural.  No recurrent events.  He is off of Eliquis and there is no indication for this at this time.   3. Essential hypertension   BP uncontrolled.  Discontinue lisinopril and start Hyzaar 50-12.5.   4. Dyslipidemia   LDL at goal.  However elevated triglycerides.  We will add Tricor to statin therapy.   5. Tobacco user   patient continues to smoke half a pack to 1 pack per day.  I spent greater than 3 minutes discussing CV risks and encouraging cessation.  Patient does not wish to quit at this time.     Plan:   BP uncontrolled.  Discontinue lisinopril and add Hyzaar 50-12.5.  Elevated triglycerides despite statin therapy.  We will add Tricor.  Patient continues to smoke 1/2-1 pack per day.  Encourage cessation.  Patient does not wish to quit at this time.  Patient was unsure of his current medical regimen.  I  had to contact the pharmacy to verify his medications.  Continue all other current medications.   FU in 6 MO, sooner as needed.  Thank you for allowing us to participate in the care of your patient.     Kayli Aranda PA-C    Please note that portions of this note may have been completed with a voice recognition program. Efforts were made to edit the dictations, but occasionally words are mistranscribed.

## 2022-05-20 ENCOUNTER — OFFICE VISIT (OUTPATIENT)
Dept: CARDIOLOGY | Facility: CLINIC | Age: 52
End: 2022-05-20

## 2022-05-20 VITALS
WEIGHT: 215 LBS | SYSTOLIC BLOOD PRESSURE: 142 MMHG | DIASTOLIC BLOOD PRESSURE: 82 MMHG | BODY MASS INDEX: 31.84 KG/M2 | OXYGEN SATURATION: 96 % | HEART RATE: 74 BPM | HEIGHT: 69 IN

## 2022-05-20 DIAGNOSIS — I25.10 CORONARY ARTERY DISEASE INVOLVING NATIVE CORONARY ARTERY OF NATIVE HEART WITHOUT ANGINA PECTORIS: Primary | ICD-10-CM

## 2022-05-20 DIAGNOSIS — I48.0 PAROXYSMAL ATRIAL FIBRILLATION: ICD-10-CM

## 2022-05-20 DIAGNOSIS — Z72.0 TOBACCO USER: Chronic | ICD-10-CM

## 2022-05-20 DIAGNOSIS — I10 ESSENTIAL HYPERTENSION: ICD-10-CM

## 2022-05-20 DIAGNOSIS — E78.5 DYSLIPIDEMIA: ICD-10-CM

## 2022-05-20 PROCEDURE — 99214 OFFICE O/P EST MOD 30 MIN: CPT

## 2022-05-20 RX ORDER — LOSARTAN POTASSIUM AND HYDROCHLOROTHIAZIDE 12.5; 5 MG/1; MG/1
1 TABLET ORAL DAILY
Qty: 30 TABLET | Refills: 6 | Status: SHIPPED | OUTPATIENT
Start: 2022-05-20 | End: 2023-01-06 | Stop reason: SDUPTHER

## 2022-05-20 RX ORDER — FENOFIBRATE 48 MG/1
48 TABLET, COATED ORAL DAILY
Qty: 90 TABLET | Refills: 3 | Status: SHIPPED | OUTPATIENT
Start: 2022-05-20

## 2022-06-03 DIAGNOSIS — M19.012 PRIMARY OSTEOARTHRITIS OF LEFT SHOULDER: ICD-10-CM

## 2022-06-03 RX ORDER — CYCLOBENZAPRINE HCL 10 MG
TABLET ORAL
Qty: 30 TABLET | Refills: 0 | Status: SHIPPED | OUTPATIENT
Start: 2022-06-03 | End: 2023-03-29 | Stop reason: SDUPTHER

## 2022-06-03 NOTE — TELEPHONE ENCOUNTER
LV: 04/25/2022  NV: 10/25/2022    Muscle Relaxers are not on the approved refill list. Please determine refill.

## 2022-07-27 ENCOUNTER — TELEPHONE (OUTPATIENT)
Dept: INTERNAL MEDICINE | Facility: CLINIC | Age: 52
End: 2022-07-27

## 2022-07-27 NOTE — TELEPHONE ENCOUNTER
Attempted to contact the patient at [phone] to ask the patient about his Cologuard order. Unable to Lvm.     HUB TO READ: Please ask the patient if he received his Cologuard kit and remind him to complete this if he has. If he no longer wishes to do this please document and route back to the clinical pool.

## 2022-08-04 DIAGNOSIS — I10 ESSENTIAL HYPERTENSION: ICD-10-CM

## 2022-08-04 RX ORDER — METOPROLOL TARTRATE 50 MG/1
TABLET, FILM COATED ORAL
Qty: 180 TABLET | Refills: 3 | Status: SHIPPED | OUTPATIENT
Start: 2022-08-04

## 2022-08-04 RX ORDER — CLOPIDOGREL BISULFATE 75 MG/1
TABLET ORAL
Qty: 90 TABLET | Refills: 3 | Status: SHIPPED | OUTPATIENT
Start: 2022-08-04

## 2022-11-07 ENCOUNTER — OFFICE VISIT (OUTPATIENT)
Dept: INTERNAL MEDICINE | Facility: CLINIC | Age: 52
End: 2022-11-07

## 2022-11-07 VITALS
OXYGEN SATURATION: 98 % | WEIGHT: 230.4 LBS | HEIGHT: 69 IN | HEART RATE: 68 BPM | DIASTOLIC BLOOD PRESSURE: 74 MMHG | SYSTOLIC BLOOD PRESSURE: 132 MMHG | TEMPERATURE: 97.1 F | RESPIRATION RATE: 16 BRPM | BODY MASS INDEX: 34.13 KG/M2

## 2022-11-07 DIAGNOSIS — G89.29 CHRONIC MIDLINE LOW BACK PAIN WITHOUT SCIATICA: ICD-10-CM

## 2022-11-07 DIAGNOSIS — M54.50 CHRONIC MIDLINE LOW BACK PAIN WITHOUT SCIATICA: ICD-10-CM

## 2022-11-07 DIAGNOSIS — I25.10 CORONARY ARTERY DISEASE INVOLVING NATIVE CORONARY ARTERY OF NATIVE HEART WITHOUT ANGINA PECTORIS: ICD-10-CM

## 2022-11-07 DIAGNOSIS — E78.2 MIXED HYPERLIPIDEMIA: ICD-10-CM

## 2022-11-07 DIAGNOSIS — F41.9 ANXIETY: ICD-10-CM

## 2022-11-07 DIAGNOSIS — I10 ESSENTIAL HYPERTENSION: Primary | ICD-10-CM

## 2022-11-07 DIAGNOSIS — Z72.0 TOBACCO USER: Chronic | ICD-10-CM

## 2022-11-07 PROCEDURE — 99214 OFFICE O/P EST MOD 30 MIN: CPT | Performed by: NURSE PRACTITIONER

## 2022-11-07 RX ORDER — METHYLPREDNISOLONE 4 MG/1
TABLET ORAL
Qty: 21 TABLET | Refills: 0 | Status: SHIPPED | OUTPATIENT
Start: 2022-11-07 | End: 2023-01-09

## 2022-11-07 RX ORDER — DULOXETIN HYDROCHLORIDE 30 MG/1
30 CAPSULE, DELAYED RELEASE ORAL DAILY
Qty: 30 CAPSULE | Refills: 3 | Status: SHIPPED | OUTPATIENT
Start: 2022-11-07 | End: 2023-03-21

## 2022-11-07 NOTE — PROGRESS NOTES
Denny Hutchins presents to [facility] for     Chief Complaint  Chief Complaint   Patient presents with   • Follow-up     6 Month F/u  Hypertension and Hyperlipidemia        PCP:  Bere Dowling APRN    Subjective        History of Present Illness   HTN-chronic.  Currently on metoprolol, losartan-hydrochlorothiazide.  Compliant with dosing denies any adverse effects.  Denies headaches, dizziness, visual disturbances, palpitations chest pain, dyspnea, TIA or CVA symptoms, leg pain/claudication symptoms, and edema.  Does have history of MI.  Is on aspirin and Plavix.  Follows with cardiology, Dr. Headley.      He is still smoking about a half a pack a day.    Hyperlipidemia-chronic.  Currently on fenofibrate and atorvastatin.  Compliant with dosing and denies adverse effects.  Diet is unhealthy.  Exercise is minimal.  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       He did see dermatology, Kerwin Garcia, for the lesion to his left forearm.  He had excision which was benign.      Anxiety/depression-chronic.    He feels as though this is well-controlled with Lexapro.  No HI/SI  Has had soreness all over his body for the last month.  No injuries or traumas.  Has trouble getting up out of bed due to pain in the low back.  Pain is in the mid and right low back.  It does not radiate.  It is becoming more frequent.  Pain is moderate.  He tells me that he did take a couple of his bosses Lortabs and this helped a lot.  Got a new bed and has not helped.   cannot take NSAIDS.       Health Maintenance:   Health Maintenance   Topic Date Due   • COLORECTAL CANCER SCREENING  04/26/2022   • ZOSTER VACCINE (1 of 2) 11/07/2022 (Originally 7/22/2021)   • COVID-19 Vaccine (4 - Booster for Pfizer series) 11/09/2022 (Originally 1/1/2022)   • INFLUENZA VACCINE  03/31/2023 (Originally 8/1/2022)   • Pneumococcal Vaccine 0-64 (2 - PCV) 11/07/2023 (Originally 12/2/2021)   • LIPID PANEL   04/25/2023   • ANNUAL PHYSICAL  04/26/2023   • TDAP/TD VACCINES (2 - Td or Tdap) 04/25/2032   • HEPATITIS C SCREENING  Completed      he sent his cologuard back about a week ago.   He declines shingles, COVID, flu, and pneumonia vaccinations today.  Counseled.      Review of Systems   Constitutional: Negative for appetite change, diaphoresis, fatigue, fever, unexpected weight gain and unexpected weight loss.   Eyes: Negative for blurred vision, double vision, pain and visual disturbance.   Respiratory: Negative for cough, chest tightness, shortness of breath and wheezing.    Cardiovascular: Negative for chest pain, palpitations and leg swelling.   Gastrointestinal: Negative for abdominal distention, abdominal pain, constipation, diarrhea, nausea and vomiting.   Endocrine: Negative for polydipsia, polyphagia and polyuria.   Genitourinary: Negative for difficulty urinating, frequency and urgency.   Musculoskeletal: Positive for arthralgias, back pain, gait problem and myalgias.   Skin: Negative for color change and rash.   Neurological: Negative for dizziness, facial asymmetry, weakness, light-headedness, numbness, headache and confusion.   Hematological: Negative for adenopathy. Does not bruise/bleed easily.   Psychiatric/Behavioral: Negative for sleep disturbance.         No Known Allergies  Current Outpatient Medications on File Prior to Visit   Medication Sig Dispense Refill   • aspirin 81 MG EC tablet aspirin 81 mg tablet,delayed release   Take 1 tablet every day by oral route.     • atorvastatin (LIPITOR) 80 MG tablet TAKE 1 TABLET BY MOUTH EVERY DAY 30 tablet 0   • clopidogrel (PLAVIX) 75 MG tablet TAKE 1 TABLET BY MOUTH EVERY DAY 90 tablet 3   • cyclobenzaprine (FLEXERIL) 10 MG tablet TAKE 1 TABLET BY MOUTH THREE TIMES A DAY AS NEEDED FOR MUSCLE SPASM 30 tablet 0   • fenofibrate (TRICOR) 48 MG tablet Take 1 tablet by mouth Daily. 90 tablet 3   • losartan-hydrochlorothiazide (HYZAAR) 50-12.5 MG per tablet Take  "1 tablet by mouth Daily. 30 tablet 6   • metoprolol tartrate (LOPRESSOR) 50 MG tablet TAKE 1 TABLET BY MOUTH TWICE A  tablet 3   • [DISCONTINUED] escitalopram (LEXAPRO) 10 MG tablet TAKE 1 TABLET BY MOUTH EVERY DAY 30 tablet 0     No current facility-administered medications on file prior to visit.         The following portions of the patient's history were reviewed and updated as appropriate: allergies, current medications, past family history, past medical history, past social history, past surgical history and problem list and are available for review within electronic record    Objective     Result Review :     The following data was reviewed by: JW Roberson on 11/07/2022:  CMP    CMP 1/25/22 4/25/22   Glucose 77 96   BUN 12 19   Creatinine 1.35 (A) 1.09   eGFR Non African Am 56 (A)    Sodium 135 (A) 135 (A)   Potassium 4.0 4.9   Chloride 97 (A) 99   Calcium 9.4 9.5   Albumin  4.90   Total Bilirubin  0.6   Alkaline Phosphatase  77   AST (SGOT)  32   ALT (SGPT)  35   (A) Abnormal value            CBC    CBC 4/25/22   WBC 7.48   RBC 4.40   Hemoglobin 14.9   Hematocrit 42.6   MCV 96.8   MCH 33.9 (A)   MCHC 35.0   RDW 12.0 (A)   Platelets 243   (A) Abnormal value            Lipid Panel    Lipid Panel 4/25/22   Total Cholesterol 121   Triglycerides 264 (A)   HDL Cholesterol 34 (A)   VLDL Cholesterol 41 (A)   LDL Cholesterol  46   LDL/HDL Ratio 1.01   (A) Abnormal value                               Vital Signs:   /74 (BP Location: Left arm, Patient Position: Sitting, Cuff Size: Adult)   Pulse 68   Temp 97.1 °F (36.2 °C) (Infrared)   Resp 16   Ht 175.3 cm (69\")   Wt 105 kg (230 lb 6.4 oz)   SpO2 98%   BMI 34.02 kg/m²         Physical Exam  Vitals and nursing note reviewed.   Constitutional:       General: He is not in acute distress.     Appearance: Normal appearance. He is well-developed. He is not diaphoretic.   HENT:      Head: Normocephalic and atraumatic.   Eyes:      " Conjunctiva/sclera: Conjunctivae normal.   Neck:      Vascular: No JVD.   Cardiovascular:      Rate and Rhythm: Normal rate and regular rhythm.      Pulses:           Dorsalis pedis pulses are 2+ on the right side and 2+ on the left side.        Posterior tibial pulses are 2+ on the right side and 2+ on the left side.      Heart sounds: Normal heart sounds. No murmur heard.  Pulmonary:      Effort: Pulmonary effort is normal. No respiratory distress.      Breath sounds: Normal breath sounds.   Chest:      Chest wall: No tenderness.   Abdominal:      General: Bowel sounds are normal. There is no distension.      Palpations: Abdomen is soft.      Tenderness: There is no abdominal tenderness.   Musculoskeletal:      Cervical back: Normal range of motion.      Lumbar back: Pain and tenderness present. No swelling, edema, deformity, signs of trauma, lacerations, spasms or bony tenderness. Decreased range of motion. Negative right straight leg raise test and negative left straight leg raise test. No scoliosis.   Skin:     General: Skin is warm and dry.      Coloration: Skin is not pale.      Findings: No erythema or rash.   Neurological:      Mental Status: He is alert and oriented to person, place, and time.      Cranial Nerves: No cranial nerve deficit.      Sensory: No sensory deficit.      Coordination: Coordination normal.      Deep Tendon Reflexes: Reflexes are normal and symmetric.   Psychiatric:         Speech: Speech normal.         Behavior: Behavior normal.         Thought Content: Thought content normal.         Judgment: Judgment normal.                 Assessment and Plan      Diagnoses and all orders for this visit:    1. Essential hypertension (Primary)  -     Comprehensive Metabolic Panel; Future  -     Lipid Panel; Future  Well-controlled.  Continue losartan-hydrochlorothiazide and metoprolol.  2. Mixed hyperlipidemia  -     Comprehensive Metabolic Panel; Future  -     Lipid Panel; Future  Stable.   Continue atorvastatin and fenofibrate.  Recheck lipids.  Low-fat low-cholesterol diet.  3. Anxiety  -     DULoxetine (Cymbalta) 30 MG capsule; Take 1 capsule by mouth Daily.  Dispense: 30 capsule; Refill: 3  -     Comprehensive Metabolic Panel; Future  -     Lipid Panel; Future  We will change his Lexapro over over to duloxetine and see if this helps with his myalgias as well.  4. Coronary artery disease involving native coronary artery of native heart without angina pectoris  Asymptomatic.  Continue aspirin, Plavix, statin, beta-blocker  5. Tobacco user  Denny Hutchins  reports that he has been smoking cigarettes. He started smoking about 26 years ago. He has a 13.00 pack-year smoking history. He has never used smokeless tobacco.. I have educated him on the risk of diseases from using tobacco products such as cancer, COPD and heart disease.     I advised him to quit and he is not willing to quit.    I spent 3.5 minutes counseling the patient.      6. Chronic midline low back pain without sciatica  -     XR Spine Lumbar 2 or 3 View; Future  -     methylPREDNISolone (MEDROL) 4 MG dose pack; Take as directed on package instructions.  Dispense: 21 tablet; Refill: 0  We will go ahead and treat with a Medrol Dosepak to see if this offsets some of the inflammation that may be contributing to the back pain.  We did discuss the possibility of referring over to Ortho or physical therapy.  He declines both of these options.  He is interested in an x-ray.  We will go ahead and order an x-ray.  Patient may use over-the-counter Tylenol 650 mg every 6 hours as needed pain   Counseled against taking controlled substances that are not prescribed to him.      Follow Up     Patient was given instructions and counseling regarding his condition or for health maintenance advice. Please see specific information pulled into the AVS if appropriate.   Any new medication's adverse effects have been discussed in detail with  patient  Patient was encouraged to keep me informed of any acute changes, lack of improvement, or any new concerning symptoms.    Return in about 3 months (around 2/7/2023) for Recheck.          Dictated Utilizing Dragon Dictation   Please note that portions of this note were completed with a voice recognition program.   Part of this note may be an electronic transcription/translation of spoken language to printed text using the Dragon Dictation System.

## 2022-11-08 ENCOUNTER — TELEPHONE (OUTPATIENT)
Dept: INTERNAL MEDICINE | Facility: CLINIC | Age: 52
End: 2022-11-08

## 2022-11-08 ENCOUNTER — OFFICE VISIT (OUTPATIENT)
Dept: INTERNAL MEDICINE | Facility: CLINIC | Age: 52
End: 2022-11-08

## 2022-11-08 VITALS
SYSTOLIC BLOOD PRESSURE: 132 MMHG | WEIGHT: 230 LBS | RESPIRATION RATE: 20 BRPM | DIASTOLIC BLOOD PRESSURE: 70 MMHG | HEIGHT: 69 IN | HEART RATE: 68 BPM | TEMPERATURE: 97.1 F | OXYGEN SATURATION: 98 % | BODY MASS INDEX: 34.07 KG/M2

## 2022-11-08 DIAGNOSIS — L03.113 CELLULITIS OF RIGHT HAND: Primary | ICD-10-CM

## 2022-11-08 PROCEDURE — 99213 OFFICE O/P EST LOW 20 MIN: CPT | Performed by: PHYSICIAN ASSISTANT

## 2022-11-08 RX ORDER — CEPHALEXIN 500 MG/1
500 CAPSULE ORAL 3 TIMES DAILY
Qty: 30 CAPSULE | Refills: 0 | Status: SHIPPED | OUTPATIENT
Start: 2022-11-08 | End: 2023-01-09

## 2022-11-08 NOTE — TELEPHONE ENCOUNTER
Called and lvm for patient to return call, office number given.     HUB: if patient returns call please inform him we will need to see the area before being able to send in antibiotics. Please schedule an appointment. If nothing available please inform him to go to  for evaluation.

## 2022-11-08 NOTE — PROGRESS NOTES
Chief Complaint  Swollen Hand     Subjective          History of Present Illness  Denny Hutchins presents to [facility] for   History of Present Illness  Hand pain:  He woke up this morning and had some redness and swelling on the back of his hand.  He thinks he may have gotten bitten by a spider in the area is infected.  He has not had any pustule or discharge from the area.  He does have dry knuckles and has a little crack on the back of one of them.  His hand feels a little swollen and hurts mostly if he tries to bend it.  Has not had a fever.  No history of MRSA infections.  The area is a little itchy but is mostly painful when he moves his hand.        Review of Systems   Constitutional: Negative for fever and unexpected weight loss.   Respiratory: Negative for cough, shortness of breath and wheezing.    Cardiovascular: Negative for chest pain and palpitations.   Skin: Positive for color change and skin lesions.       The following portions of the patient's history were reviewed and updated as appropriate: allergies, current medications, past family history, past medical history, past social history, past surgical history and problem list.  No Known Allergies    Current Outpatient Medications:   •  aspirin 81 MG EC tablet, aspirin 81 mg tablet,delayed release  Take 1 tablet every day by oral route., Disp: , Rfl:   •  atorvastatin (LIPITOR) 80 MG tablet, TAKE 1 TABLET BY MOUTH EVERY DAY, Disp: 30 tablet, Rfl: 0  •  clopidogrel (PLAVIX) 75 MG tablet, TAKE 1 TABLET BY MOUTH EVERY DAY, Disp: 90 tablet, Rfl: 3  •  cyclobenzaprine (FLEXERIL) 10 MG tablet, TAKE 1 TABLET BY MOUTH THREE TIMES A DAY AS NEEDED FOR MUSCLE SPASM, Disp: 30 tablet, Rfl: 0  •  DULoxetine (Cymbalta) 30 MG capsule, Take 1 capsule by mouth Daily., Disp: 30 capsule, Rfl: 3  •  fenofibrate (TRICOR) 48 MG tablet, Take 1 tablet by mouth Daily., Disp: 90 tablet, Rfl: 3  •  losartan-hydrochlorothiazide (HYZAAR)  "50-12.5 MG per tablet, Take 1 tablet by mouth Daily., Disp: 30 tablet, Rfl: 6  •  methylPREDNISolone (MEDROL) 4 MG dose pack, Take as directed on package instructions., Disp: 21 tablet, Rfl: 0  •  metoprolol tartrate (LOPRESSOR) 50 MG tablet, TAKE 1 TABLET BY MOUTH TWICE A DAY, Disp: 180 tablet, Rfl: 3  •  cephalexin (Keflex) 500 MG capsule, Take 1 capsule by mouth 3 (Three) Times a Day., Disp: 30 capsule, Rfl: 0  New Medications Ordered This Visit   Medications   • cephalexin (Keflex) 500 MG capsule     Sig: Take 1 capsule by mouth 3 (Three) Times a Day.     Dispense:  30 capsule     Refill:  0     Social History     Tobacco Use   Smoking Status Every Day   • Packs/day: 0.50   • Years: 26.00   • Pack years: 13.00   • Types: Cigarettes   • Start date: 1996   Smokeless Tobacco Never        Objective   Vital Signs:   Vitals:    11/08/22 1411   BP: 132/70   Pulse: 68   Resp: 20   Temp: 97.1 °F (36.2 °C)   TempSrc: Temporal   SpO2: 98%   Weight: 104 kg (230 lb)   Height: 175.3 cm (69\")   PainSc: 10-Worst pain ever  Comment: when bending hand   PainLoc: Hand      Physical Exam  Vitals reviewed.   Constitutional:       General: He is not in acute distress.     Appearance: Normal appearance.   HENT:      Head: Normocephalic and atraumatic.   Eyes:      General: No scleral icterus.     Extraocular Movements: Extraocular movements intact.      Conjunctiva/sclera: Conjunctivae normal.   Cardiovascular:      Rate and Rhythm: Normal rate and regular rhythm.      Heart sounds: Normal heart sounds. No murmur heard.  Pulmonary:      Effort: Pulmonary effort is normal. No respiratory distress.      Breath sounds: Normal breath sounds. No stridor. No wheezing or rhonchi.   Musculoskeletal:      Right hand: Swelling and tenderness present. Normal strength. Normal capillary refill.        Hands:       Cervical back: Normal range of motion and neck supple.   Skin:     General: Skin is warm and dry.      Coloration: Skin is not " jaundiced.   Neurological:      General: No focal deficit present.      Mental Status: He is alert and oriented to person, place, and time.      Gait: Gait normal.   Psychiatric:         Mood and Affect: Mood normal.         Behavior: Behavior normal.        No LMP for male patient.    Result Review :                   Assessment and Plan    Diagnoses and all orders for this visit:    1. Cellulitis of right hand (Primary)  Assessment & Plan:  Treat with keflex, adv if sx not improving over the next 1-2 d to f/u or if sx worsen go to ER/UC, monitor and f/u for concerning sx such as wound discharge, fever, spreading redness, worsening swelling/pain.    Orders:  -     cephalexin (Keflex) 500 MG capsule; Take 1 capsule by mouth 3 (Three) Times a Day.  Dispense: 30 capsule; Refill: 0      Follow Up   Return if symptoms worsen or fail to improve.    Follow up if symptoms worsen or persist or has new or concerning symptoms, go to ER for severe symptoms.   Reviewed common medication effects and side effects and advised to report side effects immediately.  Encouraged medication compliance and the importance of keeping scheduled follow up appointments with me and any other providers.  If a referral was made please contact our office if you have not heard about an appointment in the next 2 weeks.   If labs or images are ordered we will contact you with the results within the next week.  If you have not heard from us after a week please call our office to inquire about the results.   Patient was given instructions and counseling regarding his condition or for health maintenance advice. Please see specific information pulled into the AVS if appropriate.     Nan Santo PA-C    * Please note that portions of this note were completed with a voice recognition program.

## 2022-11-08 NOTE — TELEPHONE ENCOUNTER
Caller: Denny Hutchins    Relationship: Self    Best call back number: [phone]    What medication are you requesting: ANTIBIOTIC    What are your current symptoms: HANDS ARE SWOLLEN    How long have you been experiencing symptoms: NA    Have you had these symptoms before:    [] Yes  [] No    Have you been treated for these symptoms before:   [] Yes  [] No    If a prescription is needed, what is your preferred pharmacy and phone number: [id number] - [place], [place] - [address] [phone] [facility] [phone]      Additional notes:  PATIENT STATED HE IS AT WORK AND HANDS ARE SWOLLEN.  STATED HE IS NOT CERTAIN IF HE WAS BITTEN BY A SPIDER

## 2022-11-08 NOTE — ASSESSMENT & PLAN NOTE
Treat with keflex, adv if sx not improving over the next 1-2 d to f/u or if sx worsen go to ER/UC, monitor and f/u for concerning sx such as wound discharge, fever, spreading redness, worsening swelling/pain.

## 2022-12-04 DIAGNOSIS — E78.2 MIXED HYPERLIPIDEMIA: ICD-10-CM

## 2022-12-05 RX ORDER — ATORVASTATIN CALCIUM 80 MG/1
TABLET, FILM COATED ORAL
Qty: 30 TABLET | Refills: 1 | Status: SHIPPED | OUTPATIENT
Start: 2022-12-05 | End: 2023-02-13

## 2022-12-05 NOTE — TELEPHONE ENCOUNTER
Rx Refill Note  Requested Prescriptions     Pending Prescriptions Disp Refills   • atorvastatin (LIPITOR) 80 MG tablet [Pharmacy Med Name: ATORVASTATIN 80 MG TABLET] 30 tablet 0     Sig: TAKE 1 TABLET BY MOUTH EVERY DAY      Last filled: 11/04/2022  Last office visit with prescribing clinician: 11/7/2022      Next office visit with prescribing clinician: 2/8/2023 April MARIE Smith MA  12/05/22, 08:13 EST

## 2023-01-06 ENCOUNTER — OFFICE VISIT (OUTPATIENT)
Dept: CARDIOLOGY | Facility: CLINIC | Age: 53
End: 2023-01-06
Payer: COMMERCIAL

## 2023-01-06 VITALS
DIASTOLIC BLOOD PRESSURE: 80 MMHG | SYSTOLIC BLOOD PRESSURE: 130 MMHG | BODY MASS INDEX: 34.51 KG/M2 | WEIGHT: 233 LBS | OXYGEN SATURATION: 99 % | HEIGHT: 69 IN | HEART RATE: 69 BPM

## 2023-01-06 DIAGNOSIS — I48.0 PAROXYSMAL ATRIAL FIBRILLATION: ICD-10-CM

## 2023-01-06 DIAGNOSIS — I10 ESSENTIAL HYPERTENSION: ICD-10-CM

## 2023-01-06 DIAGNOSIS — E78.5 DYSLIPIDEMIA: ICD-10-CM

## 2023-01-06 DIAGNOSIS — Z72.0 TOBACCO USER: ICD-10-CM

## 2023-01-06 DIAGNOSIS — I25.10 CORONARY ARTERY DISEASE INVOLVING NATIVE CORONARY ARTERY OF NATIVE HEART WITHOUT ANGINA PECTORIS: Primary | ICD-10-CM

## 2023-01-06 PROCEDURE — 99214 OFFICE O/P EST MOD 30 MIN: CPT | Performed by: INTERNAL MEDICINE

## 2023-01-06 PROCEDURE — 93000 ELECTROCARDIOGRAM COMPLETE: CPT | Performed by: INTERNAL MEDICINE

## 2023-01-06 RX ORDER — HYDROCODONE BITARTRATE AND ACETAMINOPHEN 5; 325 MG/1; MG/1
TABLET ORAL AS NEEDED
COMMUNITY
Start: 2022-12-14 | End: 2023-03-29

## 2023-01-06 RX ORDER — LOSARTAN POTASSIUM AND HYDROCHLOROTHIAZIDE 12.5; 5 MG/1; MG/1
1 TABLET ORAL DAILY
Qty: 30 TABLET | Refills: 6 | Status: SHIPPED | OUTPATIENT
Start: 2023-01-06

## 2023-01-06 NOTE — PROGRESS NOTES
[facility] Cardiology    Encounter Date: 2023    Patient ID: Denny Hutchins is a 52 y.o. male.  : 1970     PCP: Bere Dowling APRN       Chief Complaint: Coronary artery disease involving native coronary artery of      PROBLEM LIST:  1. Coronary artery disease  a. NSTEMI, 2021  b. LHC, 2021: Subtotal proximal RCA occlusion. 3.5 x 18 mm Resolute Woodruff stent  c. Echo 2021: Normal LV size.  Mild LVH.  EF 50% with inferobasal akinesia. Abnormal systolic strain pattern. Abnormal RA size.  Dilated right ventricle with abnormal right ventricular function. Mildly dilated aortic root 3.89 cm. Mildly dilated ascending aorta 3.92 cm. Normal valvular morphology.  2. Paroxysmal atrial fibrillation  a. New onset 2021 during hospitalization for MI at [facility].  Started on amiodarone, metoprolol, and Eliquis. Amiodarone and Eliquis discontinued by Dr. Michaels at follow-up.  b. Event monitor, 2021: Monitored for 12 days. Baseline SR with PAC.   3. Dyslipidemia   4. Essential hypertension   5. Tobacco use   6. Needle phobia     History of Present Illness  Patient presents today for a follow-up with a history of CAD, PAF, and cardiac risk factors. Since last visit, patient has been doing well overall from a cardiovascular standpoint. 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   He has gained weight and he continues to smoke 1/2 pack per day. Patient states that he feels short of breath with moderate to severe activity. He denies chest pain, orthopnea, palpitations, edema, dizziness, and syncope.     No Known Allergies      Current Outpatient Medications:   •  aspirin 81 MG EC tablet, aspirin 81 mg tablet,delayed release  Take 1 tablet every day by oral route., Disp: , Rfl:   •  atorvastatin (LIPITOR) 80 MG tablet, TAKE 1 TABLET BY MOUTH EVERY DAY, Disp: 30  tablet, Rfl: 1  •  clopidogrel (PLAVIX) 75 MG tablet, TAKE 1 TABLET BY MOUTH EVERY DAY, Disp: 90 tablet, Rfl: 3  •  cyclobenzaprine (FLEXERIL) 10 MG tablet, TAKE 1 TABLET BY MOUTH THREE TIMES A DAY AS NEEDED FOR MUSCLE SPASM, Disp: 30 tablet, Rfl: 0  •  DULoxetine (Cymbalta) 30 MG capsule, Take 1 capsule by mouth Daily., Disp: 30 capsule, Rfl: 3  •  fenofibrate (TRICOR) 48 MG tablet, Take 1 tablet by mouth Daily., Disp: 90 tablet, Rfl: 3  •  HYDROcodone-acetaminophen (NORCO) 5-325 MG per tablet, As Needed., Disp: , Rfl:   •  losartan-hydrochlorothiazide (HYZAAR) 50-12.5 MG per tablet, Take 1 tablet by mouth Daily., Disp: 30 tablet, Rfl: 6  •  metoprolol tartrate (LOPRESSOR) 50 MG tablet, TAKE 1 TABLET BY MOUTH TWICE A DAY, Disp: 180 tablet, Rfl: 3  •  cephalexin (Keflex) 500 MG capsule, Take 1 capsule by mouth 3 (Three) Times a Day., Disp: 30 capsule, Rfl: 0  •  methylPREDNISolone (MEDROL) 4 MG dose pack, Take as directed on package instructions., Disp: 21 tablet, Rfl: 0    The following portions of the patient's history were reviewed and updated as appropriate: allergies, current medications, past family history, past medical history, past social history, past surgical history and problem list.    ROS  Review of Systems   14 point ROS negative except for that listed in the HPI.         Objective:     /80 (BP Location: Right arm, Patient Position: Sitting)   Pulse 69   Ht 175.3 cm (69\")   Wt 106 kg (233 lb)   SpO2 99%   BMI 34.41 kg/m²      Physical Exam  Constitutional: Patient appears well-developed and well-nourished.   HENT: HEENT exam unremarkable.   Neck: Neck supple. No JVD present. No carotid bruits.   Cardiovascular: Normal rate, regular rhythm and normal heart sounds. No murmur heard.   2+ symmetric pulses.   Pulmonary/Chest: Breath sounds normal. Does not exhibit tenderness.   Abdominal: Abdomen benign.   Musculoskeletal: Does not exhibit edema.   Neurological: Neurological exam unremarkable.    Vitals reviewed.    Data Review:   Lab Results   Component Value Date    GLUCOSE 96 04/25/2022    BUN 19 04/25/2022    CREATININE 1.09 04/25/2022    EGFRIFNONA 56 (L) 01/25/2022    BCR 17.4 04/25/2022     (L) 04/25/2022    K 4.9 04/25/2022    CO2 21.9 (L) 04/25/2022    CALCIUM 9.5 04/25/2022    ALBUMIN 4.90 04/25/2022    AST 32 04/25/2022    ALT 35 04/25/2022     Lab Results   Component Value Date    CHOL 121 04/25/2022    TRIG 264 (H) 04/25/2022    HDL 34 (L) 04/25/2022    LDL 46 04/25/2022      Lab Results   Component Value Date    WBC 7.48 04/25/2022    RBC 4.40 04/25/2022    HGB 14.9 04/25/2022    HCT 42.6 04/25/2022    MCV 96.8 04/25/2022     04/25/2022          ECG 12 Lead    Date/Time: 1/6/2023 10:35 AM  Performed by: Gutierrez Headley MD  Authorized by: Gutierrez Headley MD   Comparison: compared with previous ECG from 5/7/2021  Similar to previous ECG  Rhythm: sinus rhythm  BPM: 69    Clinical impression: normal ECG                     Assessment:      Diagnosis Plan   1. Coronary artery disease involving native coronary artery of native heart without angina pectoris  Stable without angina on current activity. Continue aspirin 81 mg and Plavix 75 mg daily for DAPT.  Continue rest of the current GDMT.   2. Paroxysmal atrial fibrillation (HCC)   single episode during hospitalization for ACS, no subsequent recurrence, stable   3. Essential hypertension  Well controlled. Continue on Hyzaar 50-12.5 mg daily and metoprolol 50 mg BID for hypertension.   4. Dyslipidemia  Well controlled. LDL 46 on 04/25/2022. Continue on atorvastatin 80 mg daily for hyperlipidemia.    5. Tobacco user  Strongly encouraged smoking cessation and counseled patient >3 minutes.     Plan:   Stable cardiac status.  No current angina or CHF symptoms.  Exertional dyspnea is multifactorial in the setting of weight gain, cigarette smoking etc.  Importance of managing his ongoing risk factors discussed.  Patient was encouraged to be  active and have a healthy diet with a goal of losing weight.   Strongly encouraged smoking cessation and counseled patient >3 minutes.  Continue current medications.   FU in 6 MO, sooner as needed.  Thank you for allowing us to participate in the care of your patient.     Scribed for Gutierrez Headley MD by Maribel Bloom. 1/6/2023  10:25 EST    I, Gutierrez Headley MD, personally performed the services described in this documentation as scribed by the above named individual in my presence, and it is both accurate and complete.  1/9/2023  06:04 EST      Please note that portions of this note may have been completed with a voice recognition program. Efforts were made to edit the dictations, but occasionally words are mistranscribed.

## 2023-02-13 DIAGNOSIS — E78.2 MIXED HYPERLIPIDEMIA: ICD-10-CM

## 2023-02-13 RX ORDER — ATORVASTATIN CALCIUM 80 MG/1
TABLET, FILM COATED ORAL
Qty: 30 TABLET | Refills: 1 | Status: SHIPPED | OUTPATIENT
Start: 2023-02-13 | End: 2023-03-29 | Stop reason: SDUPTHER

## 2023-03-18 DIAGNOSIS — F41.9 ANXIETY: ICD-10-CM

## 2023-03-21 RX ORDER — DULOXETIN HYDROCHLORIDE 30 MG/1
CAPSULE, DELAYED RELEASE ORAL
Qty: 30 CAPSULE | Refills: 0 | Status: SHIPPED | OUTPATIENT
Start: 2023-03-21 | End: 2023-03-29 | Stop reason: SDUPTHER

## 2023-03-21 NOTE — TELEPHONE ENCOUNTER
Rx Refill Note  Requested Prescriptions     Pending Prescriptions Disp Refills   • DULoxetine (CYMBALTA) 30 MG capsule [Pharmacy Med Name: DULOXETINE HCL DR 30 MG CAP] 30 capsule 3     Sig: TAKE 1 CAPSULE BY MOUTH EVERY DAY      Last office visit with prescribing clinician: 11/7/2022     Next office visit with prescribing clinician: Visit date not found       Sintia Jones MA  03/21/23, 15:31 EDT

## 2023-03-29 ENCOUNTER — LAB (OUTPATIENT)
Dept: INTERNAL MEDICINE | Facility: CLINIC | Age: 53
End: 2023-03-29
Payer: COMMERCIAL

## 2023-03-29 ENCOUNTER — OFFICE VISIT (OUTPATIENT)
Dept: INTERNAL MEDICINE | Facility: CLINIC | Age: 53
End: 2023-03-29
Payer: COMMERCIAL

## 2023-03-29 VITALS
RESPIRATION RATE: 18 BRPM | TEMPERATURE: 97.1 F | HEART RATE: 78 BPM | SYSTOLIC BLOOD PRESSURE: 118 MMHG | BODY MASS INDEX: 35.93 KG/M2 | WEIGHT: 242.6 LBS | HEIGHT: 69 IN | DIASTOLIC BLOOD PRESSURE: 78 MMHG | OXYGEN SATURATION: 99 %

## 2023-03-29 DIAGNOSIS — M19.012 PRIMARY OSTEOARTHRITIS OF LEFT SHOULDER: ICD-10-CM

## 2023-03-29 DIAGNOSIS — G89.29 CHRONIC MIDLINE LOW BACK PAIN WITHOUT SCIATICA: ICD-10-CM

## 2023-03-29 DIAGNOSIS — Z72.0 TOBACCO USER: ICD-10-CM

## 2023-03-29 DIAGNOSIS — F41.9 ANXIETY: ICD-10-CM

## 2023-03-29 DIAGNOSIS — E78.2 MIXED HYPERLIPIDEMIA: Primary | ICD-10-CM

## 2023-03-29 DIAGNOSIS — E78.2 MIXED HYPERLIPIDEMIA: ICD-10-CM

## 2023-03-29 DIAGNOSIS — M54.50 CHRONIC MIDLINE LOW BACK PAIN WITHOUT SCIATICA: ICD-10-CM

## 2023-03-29 DIAGNOSIS — I10 ESSENTIAL HYPERTENSION: ICD-10-CM

## 2023-03-29 PROBLEM — L03.113 CELLULITIS OF RIGHT HAND: Status: RESOLVED | Noted: 2022-11-08 | Resolved: 2023-03-29

## 2023-03-29 LAB
ALBUMIN SERPL-MCNC: 4.6 G/DL (ref 3.5–5.2)
ALBUMIN/GLOB SERPL: 1.6 G/DL
ALP SERPL-CCNC: 74 U/L (ref 39–117)
ALT SERPL W P-5'-P-CCNC: 30 U/L (ref 1–41)
ANION GAP SERPL CALCULATED.3IONS-SCNC: 13.3 MMOL/L (ref 5–15)
AST SERPL-CCNC: 22 U/L (ref 1–40)
BILIRUB SERPL-MCNC: 0.5 MG/DL (ref 0–1.2)
BUN SERPL-MCNC: 12 MG/DL (ref 6–20)
BUN/CREAT SERPL: 12.4 (ref 7–25)
CALCIUM SPEC-SCNC: 9.9 MG/DL (ref 8.6–10.5)
CHLORIDE SERPL-SCNC: 96 MMOL/L (ref 98–107)
CHOLEST SERPL-MCNC: 147 MG/DL (ref 0–200)
CO2 SERPL-SCNC: 24.7 MMOL/L (ref 22–29)
CREAT SERPL-MCNC: 0.97 MG/DL (ref 0.76–1.27)
DEPRECATED RDW RBC AUTO: 45.8 FL (ref 37–54)
EGFRCR SERPLBLD CKD-EPI 2021: 94.5 ML/MIN/1.73
ERYTHROCYTE [DISTWIDTH] IN BLOOD BY AUTOMATED COUNT: 12.9 % (ref 12.3–15.4)
GLOBULIN UR ELPH-MCNC: 2.9 GM/DL
GLUCOSE SERPL-MCNC: 105 MG/DL (ref 65–99)
HCT VFR BLD AUTO: 44.7 % (ref 37.5–51)
HDLC SERPL-MCNC: 31 MG/DL (ref 40–60)
HGB BLD-MCNC: 15.6 G/DL (ref 13–17.7)
LDLC SERPL CALC-MCNC: 76 MG/DL (ref 0–100)
LDLC/HDLC SERPL: 2.17 {RATIO}
MCH RBC QN AUTO: 33.5 PG (ref 26.6–33)
MCHC RBC AUTO-ENTMCNC: 34.9 G/DL (ref 31.5–35.7)
MCV RBC AUTO: 95.9 FL (ref 79–97)
PLATELET # BLD AUTO: 203 10*3/MM3 (ref 140–450)
PMV BLD AUTO: 8.9 FL (ref 6–12)
POTASSIUM SERPL-SCNC: 4.5 MMOL/L (ref 3.5–5.2)
PROT SERPL-MCNC: 7.5 G/DL (ref 6–8.5)
RBC # BLD AUTO: 4.66 10*6/MM3 (ref 4.14–5.8)
SODIUM SERPL-SCNC: 134 MMOL/L (ref 136–145)
TRIGL SERPL-MCNC: 244 MG/DL (ref 0–150)
VLDLC SERPL-MCNC: 40 MG/DL (ref 5–40)
WBC NRBC COR # BLD: 7.95 10*3/MM3 (ref 3.4–10.8)

## 2023-03-29 PROCEDURE — 3078F DIAST BP <80 MM HG: CPT | Performed by: NURSE PRACTITIONER

## 2023-03-29 PROCEDURE — 85027 COMPLETE CBC AUTOMATED: CPT | Performed by: NURSE PRACTITIONER

## 2023-03-29 PROCEDURE — 99214 OFFICE O/P EST MOD 30 MIN: CPT | Performed by: NURSE PRACTITIONER

## 2023-03-29 PROCEDURE — 1160F RVW MEDS BY RX/DR IN RCRD: CPT | Performed by: NURSE PRACTITIONER

## 2023-03-29 PROCEDURE — 1159F MED LIST DOCD IN RCRD: CPT | Performed by: NURSE PRACTITIONER

## 2023-03-29 PROCEDURE — 36415 COLL VENOUS BLD VENIPUNCTURE: CPT | Performed by: NURSE PRACTITIONER

## 2023-03-29 PROCEDURE — 80061 LIPID PANEL: CPT | Performed by: NURSE PRACTITIONER

## 2023-03-29 PROCEDURE — 3074F SYST BP LT 130 MM HG: CPT | Performed by: NURSE PRACTITIONER

## 2023-03-29 PROCEDURE — 80053 COMPREHEN METABOLIC PANEL: CPT | Performed by: NURSE PRACTITIONER

## 2023-03-29 RX ORDER — ATORVASTATIN CALCIUM 80 MG/1
80 TABLET, FILM COATED ORAL DAILY
Qty: 30 TABLET | Refills: 6 | Status: SHIPPED | OUTPATIENT
Start: 2023-03-29

## 2023-03-29 RX ORDER — CYCLOBENZAPRINE HCL 10 MG
10 TABLET ORAL 3 TIMES DAILY PRN
Qty: 30 TABLET | Refills: 0 | Status: SHIPPED | OUTPATIENT
Start: 2023-03-29

## 2023-03-29 RX ORDER — DULOXETIN HYDROCHLORIDE 60 MG/1
60 CAPSULE, DELAYED RELEASE ORAL DAILY
Qty: 30 CAPSULE | Refills: 6 | Status: SHIPPED | OUTPATIENT
Start: 2023-03-29

## 2023-03-29 NOTE — PROGRESS NOTES
Denny Hutchins presents to [facility] for     Chief Complaint  Chief Complaint   Patient presents with   • Hypertension     3 month f/u   • Hyperlipidemia     3 month f/u       PCP:  Bere Dowling APRN    Subjective        History of Present Illness    HTN-chronic.  Currently on metoprolol, losartan-hydrochlorothiazide.  Compliant with dosing denies any adverse effects.  Denies headaches, dizziness, visual disturbances, palpitations chest pain, dyspnea, TIA or CVA symptoms, leg pain/claudication symptoms, and edema.  Does have history of MI.  Is on aspirin and Plavix.  Follows with cardiology, Dr. Headley.    Last saw in January 2023.  Got a good report.  We will follow-up at 6 months     He is still smoking about a half a pack a day.  Not ready to quit smoking     Hyperlipidemia-chronic.  Currently on fenofibrate and atorvastatin.  Compliant with dosing and denies adverse effects.  Diet is unhealthy.  Exercise is minimal.  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        Anxiety/depression-chronic.      Was switched from Lexapro to Cymbalta in November 2022.  He has done fairly well with this.  However he feels as though he may still have some ongoing symptoms.  No HI/SI  Has had soreness all over his body for the last month.  No injuries or traumas.  Has trouble getting up out of bed due to pain in the low back.  Pain is in the mid and right low back.  It does not radiate.  It is becoming more frequent.  Pain is moderate.  Got a new bed and has not helped.   cannot take NSAIDS.   Ordered x-rays of the lumbar spine to further evaluate at his last visit.  He never completed this.      Health Maintenance:   Health Maintenance   Topic Date Due   • ZOSTER VACCINE (1 of 2) Never done   • COVID-19 Vaccine (5 - Booster) 01/01/2022   • COLORECTAL CANCER SCREENING  04/26/2022   • INFLUENZA VACCINE  03/31/2023 (Originally 8/1/2022)   • Pneumococcal Vaccine 0-64 (2 -  PCV) 11/07/2023 (Originally 12/2/2021)   • ANNUAL PHYSICAL  04/25/2023   • LIPID PANEL  04/25/2023   • TDAP/TD VACCINES (2 - Td or Tdap) 04/25/2032   • HEPATITIS C SCREENING  Completed       Declined COVID and shingles vaccinations today.  Encouraged to complete his previously ordered Cologuard    Review of Systems   Constitutional: Negative for appetite change, diaphoresis, fatigue, fever, unexpected weight gain and unexpected weight loss.   Eyes: Negative for blurred vision, double vision, pain and visual disturbance.   Respiratory: Negative for cough, chest tightness, shortness of breath and wheezing.    Cardiovascular: Negative for chest pain, palpitations and leg swelling.   Gastrointestinal: Negative for abdominal distention, abdominal pain, constipation, diarrhea, nausea and vomiting.   Endocrine: Negative for polydipsia, polyphagia and polyuria.   Genitourinary: Negative for difficulty urinating, frequency and urgency.   Musculoskeletal: Positive for arthralgias, back pain, gait problem and myalgias.   Skin: Negative for color change and rash.   Neurological: Negative for dizziness, facial asymmetry, weakness, light-headedness, numbness, headache and confusion.   Hematological: Negative for adenopathy. Does not bruise/bleed easily.   Psychiatric/Behavioral: Negative for sleep disturbance.         No Known Allergies  Current Outpatient Medications on File Prior to Visit   Medication Sig Dispense Refill   • aspirin 81 MG EC tablet aspirin 81 mg tablet,delayed release   Take 1 tablet every day by oral route.     • clopidogrel (PLAVIX) 75 MG tablet TAKE 1 TABLET BY MOUTH EVERY DAY 90 tablet 3   • fenofibrate (TRICOR) 48 MG tablet Take 1 tablet by mouth Daily. 90 tablet 3   • losartan-hydrochlorothiazide (HYZAAR) 50-12.5 MG per tablet Take 1 tablet by mouth Daily. 30 tablet 6   • metoprolol tartrate (LOPRESSOR) 50 MG tablet TAKE 1 TABLET BY MOUTH TWICE A  tablet 3   • [DISCONTINUED] atorvastatin (LIPITOR)  "80 MG tablet TAKE 1 TABLET BY MOUTH EVERY DAY 30 tablet 1   • [DISCONTINUED] cyclobenzaprine (FLEXERIL) 10 MG tablet TAKE 1 TABLET BY MOUTH THREE TIMES A DAY AS NEEDED FOR MUSCLE SPASM 30 tablet 0   • [DISCONTINUED] DULoxetine (CYMBALTA) 30 MG capsule TAKE 1 CAPSULE BY MOUTH EVERY DAY 30 capsule 0   • [DISCONTINUED] HYDROcodone-acetaminophen (NORCO) 5-325 MG per tablet As Needed. (Patient not taking: Reported on 3/29/2023)       No current facility-administered medications on file prior to visit.         The following portions of the patient's history were reviewed and updated as appropriate: allergies, current medications, past family history, past medical history, past social history, past surgical history and problem list and are available for review within electronic record    Objective     Result Review :     The following data was reviewed by: JW Roberson on 03/29/2023:  CMP    CMP 4/25/22   Glucose 96   BUN 19   Creatinine 1.09   eGFR 82.7   Sodium 135 (A)   Potassium 4.9   Chloride 99   Calcium 9.5   Total Protein 8.0   Albumin 4.90   Globulin 3.1   Total Bilirubin 0.6   Alkaline Phosphatase 77   AST (SGOT) 32   ALT (SGPT) 35   Albumin/Globulin Ratio 1.6   BUN/Creatinine Ratio 17.4   Anion Gap 14.1   (A) Abnormal value       Comments are available for some flowsheets but are not being displayed.           CBC    CBC 4/25/22   WBC 7.48   RBC 4.40   Hemoglobin 14.9   Hematocrit 42.6   MCV 96.8   MCH 33.9 (A)   MCHC 35.0   RDW 12.0 (A)   Platelets 243   (A) Abnormal value            Lipid Panel    Lipid Panel 4/25/22   Total Cholesterol 121   Triglycerides 264 (A)   HDL Cholesterol 34 (A)   VLDL Cholesterol 41 (A)   LDL Cholesterol  46   LDL/HDL Ratio 1.01   (A) Abnormal value                       Vital Signs:   /78 (BP Location: Right arm, Patient Position: Sitting, Cuff Size: Adult)   Pulse 78   Temp 97.1 °F (36.2 °C) (Infrared)   Resp 18   Ht 175.3 cm (69\")   Wt 110 kg (242 lb 9.6 oz)  "  SpO2 99%   BMI 35.83 kg/m²         Physical Exam  Vitals and nursing note reviewed.   Constitutional:       General: He is not in acute distress.     Appearance: Normal appearance. He is well-developed. He is not diaphoretic.   HENT:      Head: Normocephalic and atraumatic.   Eyes:      Conjunctiva/sclera: Conjunctivae normal.   Neck:      Vascular: No JVD.   Cardiovascular:      Rate and Rhythm: Normal rate and regular rhythm.      Pulses:           Dorsalis pedis pulses are 2+ on the right side and 2+ on the left side.        Posterior tibial pulses are 2+ on the right side and 2+ on the left side.      Heart sounds: Normal heart sounds. No murmur heard.  Pulmonary:      Effort: Pulmonary effort is normal. No respiratory distress.      Breath sounds: Normal breath sounds.   Chest:      Chest wall: No tenderness.   Abdominal:      General: Bowel sounds are normal. There is no distension.      Palpations: Abdomen is soft.      Tenderness: There is no abdominal tenderness.   Musculoskeletal:      Cervical back: Normal range of motion.      Lumbar back: Tenderness present. No swelling, edema, deformity, signs of trauma, lacerations, spasms or bony tenderness. Decreased range of motion. Negative right straight leg raise test and negative left straight leg raise test. No scoliosis.   Skin:     General: Skin is warm and dry.      Coloration: Skin is not pale.      Findings: No erythema or rash.   Neurological:      Mental Status: He is alert and oriented to person, place, and time.      Cranial Nerves: No cranial nerve deficit.      Sensory: No sensory deficit.      Coordination: Coordination normal.      Deep Tendon Reflexes: Reflexes are normal and symmetric.   Psychiatric:         Speech: Speech normal.         Behavior: Behavior normal.         Thought Content: Thought content normal.         Judgment: Judgment normal.                 Assessment and Plan      Diagnoses and all orders for this visit:    1. Mixed  hyperlipidemia (Primary)  -     atorvastatin (LIPITOR) 80 MG tablet; Take 1 tablet by mouth Daily.  Dispense: 30 tablet; Refill: 6  -     CBC (No Diff); Future  Stable.  Continue high-dose statin.  Low-fat low-cholesterol diet.  We will recheck lipids.  Orders placed previously.  2. Essential hypertension  -     CBC (No Diff); Future  Well-controlled.  Continue losartan-hydrochlorothiazide and metoprolol.  3. Anxiety  -     DULoxetine (CYMBALTA) 60 MG capsule; Take 1 capsule by mouth Daily.  Dispense: 30 capsule; Refill: 6  -     CBC (No Diff); Future    We will go ahead and increase his Cymbalta to 60 mg and see if this helps with some of his pain as well.  4. Tobacco user  Denny Hutchins  reports that he has been smoking cigarettes. He started smoking about 27 years ago. He has a 13.00 pack-year smoking history. He has never used smokeless tobacco.. I have educated him on the risk of diseases from using tobacco products such as cancer, COPD and heart disease.     I advised him to quit and he is not willing to quit.    I spent 3  minutes counseling the patient.       5. Chronic midline low back pain without sciatica  -     cyclobenzaprine (FLEXERIL) 10 MG tablet; Take 1 tablet by mouth 3 (Three) Times a Day As Needed for Muscle Spasms.  Dispense: 30 tablet; Refill: 0  Did not respond with Medrol Dosepak at last visit.  Try and avoid NSAIDs.  Will go ahead and treat with as needed Flexeril.  If he reconsiders we could complete an x-ray or refer to physical therapy.  He can let me know if he decides to do this  Can continue OTC Tylenol as needed- max dose 4000 mg in 24-hour    Follow Up     Patient was given instructions and counseling regarding his condition or for health maintenance advice. Please see specific information pulled into the AVS if appropriate.   Any new medication's adverse effects have been discussed in detail with patient  Patient was encouraged to keep me informed of any acute changes, lack of  improvement, or any new concerning symptoms.    Return in about 3 months (around 6/29/2023) for Annual, and need to collect labs today.          Dictated Utilizing Dragon Dictation   Please note that portions of this note were completed with a voice recognition program.   Part of this note may be an electronic transcription/translation of spoken language to printed text using the Dragon Dictation System.

## 2023-04-05 ENCOUNTER — TELEPHONE (OUTPATIENT)
Dept: INTERNAL MEDICINE | Facility: CLINIC | Age: 53
End: 2023-04-05
Payer: COMMERCIAL

## 2023-04-05 NOTE — TELEPHONE ENCOUNTER
----- Message from JW Pack sent at 4/5/2023  4:33 PM EDT -----  Please let him know that labs are stable overall.  He does still have the elevated triglycerides.  Patient should consume lean meats, whole grains, vegetables, and fruits, while avoiding concentrated sweets, junk foods, and fast foods.  Patient should engage in a minimum of 150 minutes of moderate intensity exercise per week as well.

## 2023-06-12 RX ORDER — FENOFIBRATE 48 MG/1
TABLET, COATED ORAL
Qty: 90 TABLET | Refills: 3 | Status: SHIPPED | OUTPATIENT
Start: 2023-06-12

## 2023-08-08 DIAGNOSIS — I10 ESSENTIAL HYPERTENSION: ICD-10-CM

## 2023-08-08 RX ORDER — CLOPIDOGREL BISULFATE 75 MG/1
TABLET ORAL
Qty: 90 TABLET | Refills: 3 | Status: SHIPPED | OUTPATIENT
Start: 2023-08-08

## 2023-08-08 RX ORDER — METOPROLOL TARTRATE 50 MG/1
TABLET, FILM COATED ORAL
Qty: 180 TABLET | Refills: 3 | Status: SHIPPED | OUTPATIENT
Start: 2023-08-08

## 2023-09-26 ENCOUNTER — LAB (OUTPATIENT)
Dept: INTERNAL MEDICINE | Facility: CLINIC | Age: 53
End: 2023-09-26
Payer: COMMERCIAL

## 2023-09-26 ENCOUNTER — OFFICE VISIT (OUTPATIENT)
Dept: INTERNAL MEDICINE | Facility: CLINIC | Age: 53
End: 2023-09-26
Payer: COMMERCIAL

## 2023-09-26 VITALS
BODY MASS INDEX: 36.73 KG/M2 | HEIGHT: 69 IN | OXYGEN SATURATION: 98 % | SYSTOLIC BLOOD PRESSURE: 130 MMHG | HEART RATE: 64 BPM | WEIGHT: 248 LBS | DIASTOLIC BLOOD PRESSURE: 82 MMHG | RESPIRATION RATE: 18 BRPM | TEMPERATURE: 97.1 F

## 2023-09-26 DIAGNOSIS — Z12.5 SPECIAL SCREENING, PROSTATE CANCER: ICD-10-CM

## 2023-09-26 DIAGNOSIS — I25.10 CORONARY ARTERY DISEASE INVOLVING NATIVE CORONARY ARTERY OF NATIVE HEART WITHOUT ANGINA PECTORIS: ICD-10-CM

## 2023-09-26 DIAGNOSIS — F41.9 ANXIETY: ICD-10-CM

## 2023-09-26 DIAGNOSIS — I48.0 PAROXYSMAL ATRIAL FIBRILLATION: Chronic | ICD-10-CM

## 2023-09-26 DIAGNOSIS — E78.2 MIXED HYPERLIPIDEMIA: ICD-10-CM

## 2023-09-26 DIAGNOSIS — Z72.0 TOBACCO USER: ICD-10-CM

## 2023-09-26 DIAGNOSIS — Z12.11 SCREEN FOR COLON CANCER: ICD-10-CM

## 2023-09-26 DIAGNOSIS — Z00.00 ANNUAL PHYSICAL EXAM: Primary | ICD-10-CM

## 2023-09-26 DIAGNOSIS — Z00.00 ANNUAL PHYSICAL EXAM: ICD-10-CM

## 2023-09-26 DIAGNOSIS — I10 ESSENTIAL HYPERTENSION: ICD-10-CM

## 2023-09-26 PROCEDURE — 1160F RVW MEDS BY RX/DR IN RCRD: CPT | Performed by: NURSE PRACTITIONER

## 2023-09-26 PROCEDURE — 83036 HEMOGLOBIN GLYCOSYLATED A1C: CPT | Performed by: NURSE PRACTITIONER

## 2023-09-26 PROCEDURE — 80061 LIPID PANEL: CPT | Performed by: NURSE PRACTITIONER

## 2023-09-26 PROCEDURE — 36415 COLL VENOUS BLD VENIPUNCTURE: CPT | Performed by: NURSE PRACTITIONER

## 2023-09-26 PROCEDURE — 99406 BEHAV CHNG SMOKING 3-10 MIN: CPT | Performed by: NURSE PRACTITIONER

## 2023-09-26 PROCEDURE — 3079F DIAST BP 80-89 MM HG: CPT | Performed by: NURSE PRACTITIONER

## 2023-09-26 PROCEDURE — G0103 PSA SCREENING: HCPCS | Performed by: NURSE PRACTITIONER

## 2023-09-26 PROCEDURE — 80053 COMPREHEN METABOLIC PANEL: CPT | Performed by: NURSE PRACTITIONER

## 2023-09-26 PROCEDURE — 3075F SYST BP GE 130 - 139MM HG: CPT | Performed by: NURSE PRACTITIONER

## 2023-09-26 PROCEDURE — 1159F MED LIST DOCD IN RCRD: CPT | Performed by: NURSE PRACTITIONER

## 2023-09-26 PROCEDURE — 85027 COMPLETE CBC AUTOMATED: CPT | Performed by: NURSE PRACTITIONER

## 2023-09-26 PROCEDURE — 99396 PREV VISIT EST AGE 40-64: CPT | Performed by: NURSE PRACTITIONER

## 2023-09-26 RX ORDER — NICOTINE 21 MG/24HR
1 PATCH, TRANSDERMAL 24 HOURS TRANSDERMAL EVERY 24 HOURS
Qty: 28 EACH | Refills: 0 | Status: SHIPPED | OUTPATIENT
Start: 2023-09-26

## 2023-09-26 RX ORDER — NICOTINE 21 MG/24HR
1 PATCH, TRANSDERMAL 24 HOURS TRANSDERMAL EVERY 24 HOURS
Qty: 42 PATCH | Refills: 0 | Status: SHIPPED | OUTPATIENT
Start: 2023-09-26 | End: 2023-11-07

## 2023-09-26 RX ORDER — ATORVASTATIN CALCIUM 80 MG/1
80 TABLET, FILM COATED ORAL DAILY
Qty: 30 TABLET | Refills: 6 | Status: SHIPPED | OUTPATIENT
Start: 2023-09-26

## 2023-09-26 RX ORDER — DULOXETIN HYDROCHLORIDE 60 MG/1
60 CAPSULE, DELAYED RELEASE ORAL DAILY
Qty: 30 CAPSULE | Refills: 6 | Status: SHIPPED | OUTPATIENT
Start: 2023-09-26

## 2023-09-26 NOTE — PROGRESS NOTES
Subjective   Denny Hutchins is a 53 y.o. male.     Chief Complaint   Patient presents with    Annual Exam     Pt is fasting       PCP: Bere Dowling APRN    History of Present Illness       The patient presents to the clinic for his annual exam. He has a history of hypertension, hyperlipidemia, atrial fibrillation, CAD, anxiety, and depression.     Patient is due for his shingles, influenza, and pneumonia vaccines, but prefers not to get it. He has not done a colonoscopy in the past. Cologuard was ordered previously but was not completed due to insurance issues. Vision and dental exams needs to be updated. Patient's diet is a mix of healthy and unhealthy options. Exercise is limited but he is very active with his work.     Patient has been feeling sore. He feels like he pulled a muscle on his side. He also complains of a shoulder pain which started last 09/18/2023 after doing a physical job. His work is physically demanding. He has applied Biofreeze on his shoulder with no significant relief.     His blood pressure has been stable with metoprolol 50 mg 2 times a day and Hyzaar 50-12.5 mg once a day. Denies any headaches, chest pain, dyspnea, palpitations, or dizziness.     Patient mentioned that he has been taking over the counter potassium pills for his lower extremity muscle cramps. He drinks enough water during the day. Patient also uses Tylenol and topic creams for his muscle pain.     Patient follows with cardiologist, Dr. Headley for CAD, atrial fibrillation, and hypertension. He was last seen in 07/2023 and noted that they have not noticed any recurrence of his atrial fibrillation since he had the STEMI and that his blood pressure and cholesterol are well controlled. They recommended smoking cessation.     Patient is still smoking and states that he ran out of his nicotine patches. He was previously prescribed with a nicotine gum but mentioned that it does not work for him. He is not yet ready to set a  date to quit smoking.     Patient is taking duloxetine 60 mg once a day for his anxiety and has been tolerating it well.       The following portions of the patient's history were reviewed and updated as appropriate: allergies, current medications, past family history, past medical history, past social history, past surgical history and problem list.        Review of Systems   Constitutional:  Negative for fatigue, fever and unexpected weight loss.   Eyes:  Negative for blurred vision, double vision and visual disturbance.   Respiratory:  Negative for cough, shortness of breath and wheezing.    Cardiovascular:  Negative for chest pain, palpitations and leg swelling.   Gastrointestinal:  Negative for abdominal pain, constipation, diarrhea, nausea and vomiting.   Genitourinary:  Negative for difficulty urinating, frequency and urgency.   Musculoskeletal:  Positive for myalgias. Negative for arthralgias.   Skin:  Negative for color change and rash.   Neurological:  Negative for dizziness, weakness and headache.   Hematological:  Negative for adenopathy. Does not bruise/bleed easily.         Outpatient Medications Marked as Taking for the 9/26/23 encounter (Office Visit) with Bere Dowling APRN   Medication Sig Dispense Refill    aspirin 81 MG EC tablet aspirin 81 mg tablet,delayed release   Take 1 tablet every day by oral route.      atorvastatin (LIPITOR) 80 MG tablet Take 1 tablet by mouth Daily. 30 tablet 6    clopidogrel (PLAVIX) 75 MG tablet TAKE 1 TABLET BY MOUTH EVERY DAY 90 tablet 3    DULoxetine (CYMBALTA) 60 MG capsule Take 1 capsule by mouth Daily. 30 capsule 6    fenofibrate (TRICOR) 48 MG tablet TAKE 1 TABLET BY MOUTH EVERY DAY 90 tablet 3    losartan-hydrochlorothiazide (HYZAAR) 50-12.5 MG per tablet Take 1 tablet by mouth Daily. 30 tablet 6    metoprolol tartrate (LOPRESSOR) 50 MG tablet TAKE 1 TABLET BY MOUTH TWICE A  tablet 3    [DISCONTINUED] atorvastatin (LIPITOR) 80 MG tablet Take 1  "tablet by mouth Daily. 30 tablet 6    [DISCONTINUED] DULoxetine (CYMBALTA) 60 MG capsule Take 1 capsule by mouth Daily. 30 capsule 6     No Known Allergies        Objective   Physical Exam  Vitals and nursing note reviewed.   Constitutional:       Appearance: Normal appearance. He is well-developed.   HENT:      Head: Normocephalic and atraumatic.   Eyes:      Conjunctiva/sclera: Conjunctivae normal.   Cardiovascular:      Rate and Rhythm: Normal rate and regular rhythm.      Heart sounds: Normal heart sounds.   Pulmonary:      Effort: Pulmonary effort is normal. No respiratory distress.      Breath sounds: Normal breath sounds.   Abdominal:      General: Bowel sounds are normal. There is no distension.      Palpations: Abdomen is soft.      Tenderness: There is no abdominal tenderness.   Musculoskeletal:      Cervical back: Normal range of motion.   Skin:     General: Skin is warm and dry.   Neurological:      Mental Status: He is alert and oriented to person, place, and time.   Psychiatric:         Speech: Speech normal.         Behavior: Behavior normal.         Thought Content: Thought content normal.         Judgment: Judgment normal.       Vitals:    09/26/23 1026   BP: 130/82   BP Location: Right arm   Patient Position: Sitting   Cuff Size: Adult   Pulse: 64   Resp: 18   Temp: 97.1 °F (36.2 °C)   TempSrc: Infrared   SpO2: 98%   Weight: 112 kg (248 lb)   Height: 175.3 cm (69\")     Body mass index is 36.62 kg/m².  Wt Readings from Last 3 Encounters:   09/26/23 112 kg (248 lb)   07/21/23 112 kg (246 lb)   03/29/23 110 kg (242 lb 9.6 oz)             Assessment & Plan   Diagnoses and all orders for this visit:    1. Annual physical exam (Primary)  -     CBC (No Diff); Future  -     Comprehensive Metabolic Panel; Future  -     Lipid Panel; Future  -     Hemoglobin A1c; Future    2. Essential hypertension  -     Comprehensive Metabolic Panel; Future  -     Lipid Panel; Future  -     Hemoglobin A1c; Future    3. " Mixed hyperlipidemia  -     Comprehensive Metabolic Panel; Future  -     Lipid Panel; Future  -     Hemoglobin A1c; Future  -     atorvastatin (LIPITOR) 80 MG tablet; Take 1 tablet by mouth Daily.  Dispense: 30 tablet; Refill: 6    4. Anxiety  -     Comprehensive Metabolic Panel; Future  -     Lipid Panel; Future  -     Hemoglobin A1c; Future  -     DULoxetine (CYMBALTA) 60 MG capsule; Take 1 capsule by mouth Daily.  Dispense: 30 capsule; Refill: 6    5. Coronary artery disease involving native coronary artery of native heart without angina pectoris  -     Comprehensive Metabolic Panel; Future  -     Lipid Panel; Future  -     Hemoglobin A1c; Future    6. Paroxysmal atrial fibrillation  -     Comprehensive Metabolic Panel; Future  -     Lipid Panel; Future  -     Hemoglobin A1c; Future    7. Tobacco user  -     nicotine (Nicoderm CQ) 7 MG/24HR patch; Place 1 patch on the skin as directed by provider Daily. Start after completing the 14 mg patches  Dispense: 28 each; Refill: 0  -     nicotine (Nicoderm CQ) 14 MG/24HR patch; Place 1 patch on the skin as directed by provider Daily. Start after completing the 21 mg patches  Dispense: 28 each; Refill: 0  -     nicotine (Nicoderm CQ) 21 MG/24HR patch; Place 1 patch on the skin as directed by provider Daily for 42 days.  Dispense: 42 patch; Refill: 0    8. Screen for colon cancer  -     Cologuard - Stool, Per Rectum; Future    9. Special screening, prostate cancer  -     PSA Screen; Future      Denny Hutchins  reports that he has been smoking cigarettes. He started smoking about 27 years ago. He has a 13.00 pack-year smoking history. He has been exposed to tobacco smoke. He has never used smokeless tobacco.. I have educated him on the risk of diseases from using tobacco products such as cancer, COPD, and heart disease.     I advised him to quit and he is willing to quit. We have discussed the following method/s for tobacco cessation:  OTC Cessation Products.  Together  we have set a quit date for 1 month from today.  He will follow up with me in 3 months or sooner to check on his progress.    I spent 3  minutes counseling the patient.     Annual physical exam.  Laboratory results from 03/29/2023 was reviewed and discussed with the patient. Will check blood work and screen for diabetes. CBC, CMP, lipid panel, and hemoglobin A1c were ordered. Recommended influenza, shingles, and pneumonia vaccines, but patient prefers not to get it. Cologuard will be updated. He will be screened for prostate cancer with PSA. He declines digital rectal exam today. Encouraged weight loss. He will follow-up in 3 months.     Essential hypertension.   Blood pressure has been well controlled. Patient will continue with his current medications.     Mixed hyperlipidemia.   Hyperlipidemia is stable. Lipid panel ordered. Patient will continue with atorvastatin 80 mg once a day.     Anxiety.   Anxiety is stable. He will continue with duloxetine. Prescription of duloxetine 60 mg once a day was sent to his pharmacy.     Coronary artery disease involving native coronary artery of native heart without angina pectoris.  CMP, lipid panel, and hemoglobin A1c were ordered. Patient will continue to follow-up with cardiologist, Dr. Headley. He will continue with his current medications.     Paroxysmal atrial fibrillation.  There is no recurrence since his MI.     Tobacco user.   Smoking cessation discussed with patient. Nicotine patches were sent to his pharmacy.     Screen for colon cancer.  Cologuard ordered. Patient does not have a family history of colon cancer.     Special screening, prostate cancer.  PSA ordered.     Class 2 Severe Obesity (BMI >=35 and <=39.9). Obesity-related health conditions include the following: hypertension. Obesity is worsening. BMI is is above average; BMI management plan is completed. We discussed low calorie, low carb based diet program, portion control, and increasing  exercise.      Return in about 3 months (around 12/26/2023) for chronic condition follow up, and need to collect labs today.    I discussed my findings,recommendations, and plan of care was with the patient. They verbalized understanding and agreement.  Patient was encouraged to keep me informed of any acute changes, lack of improvement, or any new concerning symptoms.     Transcribed from ambient dictation for JW Roberson by Eliana Gonzalez.  09/26/23   12:33 EDT    Patient or patient representative verbalized consent to the visit recording.  I have personally performed the services described in this document as transcribed by the above individual, and it is both accurate and complete.  JW Roberson  10/2/2023  10:18 EDT

## 2023-09-27 ENCOUNTER — TELEPHONE (OUTPATIENT)
Dept: INTERNAL MEDICINE | Facility: CLINIC | Age: 53
End: 2023-09-27
Payer: COMMERCIAL

## 2023-09-27 LAB
ALBUMIN SERPL-MCNC: 4.8 G/DL (ref 3.5–5.2)
ALBUMIN/GLOB SERPL: 1.8 G/DL
ALP SERPL-CCNC: 84 U/L (ref 39–117)
ALT SERPL W P-5'-P-CCNC: 29 U/L (ref 1–41)
ANION GAP SERPL CALCULATED.3IONS-SCNC: 10.4 MMOL/L (ref 5–15)
AST SERPL-CCNC: 20 U/L (ref 1–40)
BILIRUB SERPL-MCNC: 0.5 MG/DL (ref 0–1.2)
BUN SERPL-MCNC: 15 MG/DL (ref 6–20)
BUN/CREAT SERPL: 14.3 (ref 7–25)
CALCIUM SPEC-SCNC: 9.6 MG/DL (ref 8.6–10.5)
CHLORIDE SERPL-SCNC: 100 MMOL/L (ref 98–107)
CHOLEST SERPL-MCNC: 156 MG/DL (ref 0–200)
CO2 SERPL-SCNC: 23.6 MMOL/L (ref 22–29)
CREAT SERPL-MCNC: 1.05 MG/DL (ref 0.76–1.27)
DEPRECATED RDW RBC AUTO: 44.6 FL (ref 37–54)
EGFRCR SERPLBLD CKD-EPI 2021: 85.4 ML/MIN/1.73
ERYTHROCYTE [DISTWIDTH] IN BLOOD BY AUTOMATED COUNT: 12.7 % (ref 12.3–15.4)
GLOBULIN UR ELPH-MCNC: 2.7 GM/DL
GLUCOSE SERPL-MCNC: 99 MG/DL (ref 65–99)
HBA1C MFR BLD: 5.7 % (ref 4.8–5.6)
HCT VFR BLD AUTO: 45.3 % (ref 37.5–51)
HDLC SERPL-MCNC: 28 MG/DL (ref 40–60)
HGB BLD-MCNC: 15.6 G/DL (ref 13–17.7)
LDLC SERPL CALC-MCNC: 74 MG/DL (ref 0–100)
LDLC/HDLC SERPL: 2.17 {RATIO}
MCH RBC QN AUTO: 33.2 PG (ref 26.6–33)
MCHC RBC AUTO-ENTMCNC: 34.4 G/DL (ref 31.5–35.7)
MCV RBC AUTO: 96.4 FL (ref 79–97)
PLATELET # BLD AUTO: 234 10*3/MM3 (ref 140–450)
PMV BLD AUTO: 8.9 FL (ref 6–12)
POTASSIUM SERPL-SCNC: 4.4 MMOL/L (ref 3.5–5.2)
PROT SERPL-MCNC: 7.5 G/DL (ref 6–8.5)
PSA SERPL-MCNC: 0.54 NG/ML (ref 0–4)
RBC # BLD AUTO: 4.7 10*6/MM3 (ref 4.14–5.8)
SODIUM SERPL-SCNC: 134 MMOL/L (ref 136–145)
TRIGL SERPL-MCNC: 336 MG/DL (ref 0–150)
VLDLC SERPL-MCNC: 54 MG/DL (ref 5–40)
WBC NRBC COR # BLD: 8.57 10*3/MM3 (ref 3.4–10.8)

## 2023-09-27 NOTE — TELEPHONE ENCOUNTER
----- Message from JW Pack sent at 9/27/2023 11:53 AM EDT -----  Please let him know:   Your labs are in acceptable ranges/are stable. Please continue your current treatment plan and/or medications.

## 2023-10-06 RX ORDER — LOSARTAN POTASSIUM AND HYDROCHLOROTHIAZIDE 12.5; 5 MG/1; MG/1
TABLET ORAL
Qty: 30 TABLET | Refills: 6 | Status: SHIPPED | OUTPATIENT
Start: 2023-10-06

## 2023-12-19 ENCOUNTER — OFFICE VISIT (OUTPATIENT)
Dept: INTERNAL MEDICINE | Facility: CLINIC | Age: 53
End: 2023-12-19
Payer: COMMERCIAL

## 2023-12-19 VITALS
RESPIRATION RATE: 18 BRPM | HEART RATE: 68 BPM | WEIGHT: 249.6 LBS | BODY MASS INDEX: 36.97 KG/M2 | HEIGHT: 69 IN | OXYGEN SATURATION: 96 % | TEMPERATURE: 96.8 F | SYSTOLIC BLOOD PRESSURE: 116 MMHG | DIASTOLIC BLOOD PRESSURE: 82 MMHG

## 2023-12-19 DIAGNOSIS — I25.10 CORONARY ARTERY DISEASE INVOLVING NATIVE CORONARY ARTERY OF NATIVE HEART WITHOUT ANGINA PECTORIS: ICD-10-CM

## 2023-12-19 DIAGNOSIS — G89.29 CHRONIC PAIN OF BOTH SHOULDERS: ICD-10-CM

## 2023-12-19 DIAGNOSIS — M25.511 CHRONIC PAIN OF BOTH SHOULDERS: ICD-10-CM

## 2023-12-19 DIAGNOSIS — M25.512 CHRONIC PAIN OF BOTH SHOULDERS: ICD-10-CM

## 2023-12-19 DIAGNOSIS — R10.13 EPIGASTRIC PAIN: ICD-10-CM

## 2023-12-19 DIAGNOSIS — I48.0 PAROXYSMAL ATRIAL FIBRILLATION: ICD-10-CM

## 2023-12-19 DIAGNOSIS — Z78.9 ALCOHOL USE: ICD-10-CM

## 2023-12-19 DIAGNOSIS — E78.2 MIXED HYPERLIPIDEMIA: ICD-10-CM

## 2023-12-19 DIAGNOSIS — I10 ESSENTIAL HYPERTENSION: Primary | ICD-10-CM

## 2023-12-19 DIAGNOSIS — F41.9 ANXIETY: ICD-10-CM

## 2023-12-19 PROCEDURE — 1159F MED LIST DOCD IN RCRD: CPT | Performed by: NURSE PRACTITIONER

## 2023-12-19 PROCEDURE — 3074F SYST BP LT 130 MM HG: CPT | Performed by: NURSE PRACTITIONER

## 2023-12-19 PROCEDURE — 3079F DIAST BP 80-89 MM HG: CPT | Performed by: NURSE PRACTITIONER

## 2023-12-19 PROCEDURE — 1160F RVW MEDS BY RX/DR IN RCRD: CPT | Performed by: NURSE PRACTITIONER

## 2023-12-19 PROCEDURE — 99214 OFFICE O/P EST MOD 30 MIN: CPT | Performed by: NURSE PRACTITIONER

## 2023-12-19 RX ORDER — CHLORHEXIDINE GLUCONATE ORAL RINSE 1.2 MG/ML
SOLUTION DENTAL
COMMUNITY
Start: 2023-10-13

## 2023-12-19 RX ORDER — PANTOPRAZOLE SODIUM 40 MG/1
40 TABLET, DELAYED RELEASE ORAL DAILY
Qty: 30 TABLET | Refills: 3 | Status: SHIPPED | OUTPATIENT
Start: 2023-12-19

## 2023-12-19 NOTE — PROGRESS NOTES
Subjective   Denny Hutchins is a 53 y.o. male.     Chief Complaint   Patient presents with    Hypertension    Hyperlipidemia    Anxiety    Coronary Artery Disease    Atrial Fibrillation       PCP: Bere Dowling APRN    History of Present Illness     He is here for chronic condition follow-up. He has hypertension, hyperlipidemia, CAD, atrial fibrillation, and anxiety. He does follow with cardiology, Dr. Gutierrez Headley, for his CAD, A. fib, and hypertension.    He reports his arthritis has been bothersome. He complains of left shoulder pain and notes his right shoulder is starting to bother him. He has been applying Aspercreme Arthritis to his shoulder but it has not helped. He notes he has been taking Tylenol Arthritis. He reports he is unable to take ibuprofen.    He states he has not seen Dr. Headley since 07/2023. He reports a burning sensation in his epigastric area. He believes it is secondary to coffee. He is not on any medication for heartburn or indigestion. He states he experiences a pulling sensation on both sides of his abdomen. His notes the left side is worse than the right side.     He notes he is still drinking beer. He states he drinks a few more when he goes out with his friends. He is still smoking. He is using the patches, which are helping a lot. He notes a pack of cigarettes last him 3 to 4 days.    His anxiety is doing well on duloxetine.    He is currently on atorvastatin 80 mg. He denies any problems with this medication.    He declines the COVID-19, influenza, pneumonia, and shingles vaccines. He has the Cologuard kit.    The following portions of the patient's history were reviewed and updated as appropriate: allergies, current medications, past family history, past medical history, past social history, past surgical history and problem list.        Review of Systems   Constitutional:  Negative for fatigue, fever and unexpected weight loss.   Eyes:  Negative for blurred vision, double  vision and visual disturbance.   Respiratory:  Negative for cough, chest tightness, shortness of breath and wheezing.    Cardiovascular:  Negative for chest pain, palpitations and leg swelling.   Gastrointestinal:  Positive for indigestion. Negative for abdominal pain, constipation, diarrhea, nausea and vomiting.   Genitourinary:  Negative for difficulty urinating, frequency and urgency.   Musculoskeletal:  Positive for arthralgias and back pain. Negative for myalgias.   Skin:  Negative for color change and rash.   Neurological:  Negative for dizziness, weakness and headache.   Hematological:  Negative for adenopathy. Does not bruise/bleed easily.           Outpatient Medications Marked as Taking for the 12/19/23 encounter (Office Visit) with Bere Dowling APRN   Medication Sig Dispense Refill    aspirin 81 MG EC tablet aspirin 81 mg tablet,delayed release   Take 1 tablet every day by oral route.      atorvastatin (LIPITOR) 80 MG tablet Take 1 tablet by mouth Daily. 30 tablet 6    chlorhexidine (PERIDEX) 0.12 % solution RINSE MOUTH WITH 15ML (1 CAPFUL) FOR 30 SECONDS IN MORNING AND EVENING AFTER BRUSHING, THEN SPIT      clopidogrel (PLAVIX) 75 MG tablet TAKE 1 TABLET BY MOUTH EVERY DAY 90 tablet 3    cyclobenzaprine (FLEXERIL) 10 MG tablet Take 1 tablet by mouth 3 (Three) Times a Day As Needed for Muscle Spasms. 30 tablet 0    DULoxetine (CYMBALTA) 60 MG capsule Take 1 capsule by mouth Daily. 30 capsule 6    fenofibrate (TRICOR) 48 MG tablet TAKE 1 TABLET BY MOUTH EVERY DAY 90 tablet 3    losartan-hydrochlorothiazide (HYZAAR) 50-12.5 MG per tablet TAKE 1 TABLET BY MOUTH EVERY DAY 30 tablet 6    metoprolol tartrate (LOPRESSOR) 50 MG tablet TAKE 1 TABLET BY MOUTH TWICE A  tablet 3    nicotine (Nicoderm CQ) 14 MG/24HR patch Place 1 patch on the skin as directed by provider Daily. Start after completing the 21 mg patches 28 each 0    nicotine (Nicoderm CQ) 7 MG/24HR patch Place 1 patch on the skin as directed  "by provider Daily. Start after completing the 14 mg patches 28 each 0     No Known Allergies        Objective   Physical Exam  Vitals and nursing note reviewed.   Constitutional:       Appearance: Normal appearance. He is well-developed.   HENT:      Head: Normocephalic and atraumatic.   Eyes:      Conjunctiva/sclera: Conjunctivae normal.   Cardiovascular:      Rate and Rhythm: Normal rate and regular rhythm.      Heart sounds: Normal heart sounds.   Pulmonary:      Effort: Pulmonary effort is normal. No respiratory distress.      Breath sounds: Normal breath sounds.   Abdominal:      General: Bowel sounds are normal. There is no distension.      Palpations: Abdomen is soft.      Tenderness: There is no abdominal tenderness.   Musculoskeletal:      Cervical back: Normal range of motion.      Comments: Pain with range of motion of bilateral shoulders.   Skin:     General: Skin is warm and dry.   Neurological:      Mental Status: He is alert and oriented to person, place, and time.   Psychiatric:         Speech: Speech normal.         Behavior: Behavior normal.         Thought Content: Thought content normal.         Judgment: Judgment normal.         Vitals:    12/19/23 0851   BP: 116/82   BP Location: Right arm   Patient Position: Sitting   Cuff Size: Adult   Pulse: 68   Resp: 18   Temp: 96.8 °F (36 °C)   TempSrc: Infrared   SpO2: 96%   Weight: 113 kg (249 lb 9.6 oz)   Height: 175.3 cm (69\")     Body mass index is 36.86 kg/m².  Wt Readings from Last 3 Encounters:   12/19/23 113 kg (249 lb 9.6 oz)   09/26/23 112 kg (248 lb)   07/21/23 112 kg (246 lb)             Assessment & Plan   Diagnoses and all orders for this visit:    1. Essential hypertension (Primary)    2. Mixed hyperlipidemia    3. Anxiety    4. Coronary artery disease involving native coronary artery of native heart without angina pectoris    5. Paroxysmal atrial fibrillation    6. Epigastric pain  -     Ambulatory referral for Screening EGD    7. Alcohol " use  -     Ambulatory referral for Screening EGD    8. Chronic pain of both shoulders  -     Ambulatory Referral to Physical Therapy Evaluate and treat    Other orders  -     pantoprazole (PROTONIX) 40 MG EC tablet; Take 1 tablet by mouth Daily.  Dispense: 30 tablet; Refill: 3      Hypertension  - Well controlled. He will continue current medications.    Hyperlipidemia  - Stable. He will continue current medications.    Anxiety  - Stable. He will continue his current medications.    CAD, PAF  - He will continue current medications. He will continue to follow with cardiology.    Epigastric pain in the setting of history of alcohol abuse.  - He is unable to use omeprazole due to interactions with clopidogrel. We will go ahead and start on pantoprazole. I will also get him set up for a screening EGD.    Bilateral shoulder pain.  - Will refer to physical therapy. He will continue Tylenol Arthritis over the counter per packet instructions.          Return in about 3 months (around 3/19/2024) for chronic condition follow up.    I discussed my findings,recommendations, and plan of care was with the patient. They verbalized understanding and agreement.  Patient was encouraged to keep me informed of any acute changes, lack of improvement, or any new concerning symptoms.     Transcribed from ambient dictation for JW Roberson by Adam Whitlock.  12/19/23   10:48 EST    Patient or patient representative verbalized consent to the visit recording.  I have personally performed the services described in this document as transcribed by the above individual, and it is both accurate and complete.  JW Roberson  12/20/2023  12:48 EST

## 2024-03-06 ENCOUNTER — TELEPHONE (OUTPATIENT)
Dept: GASTROENTEROLOGY | Facility: CLINIC | Age: 54
End: 2024-03-06
Payer: COMMERCIAL

## 2024-03-19 ENCOUNTER — OFFICE VISIT (OUTPATIENT)
Dept: INTERNAL MEDICINE | Facility: CLINIC | Age: 54
End: 2024-03-19
Payer: COMMERCIAL

## 2024-03-19 VITALS
WEIGHT: 253.8 LBS | OXYGEN SATURATION: 96 % | HEIGHT: 69 IN | SYSTOLIC BLOOD PRESSURE: 124 MMHG | RESPIRATION RATE: 18 BRPM | DIASTOLIC BLOOD PRESSURE: 86 MMHG | BODY MASS INDEX: 37.59 KG/M2 | TEMPERATURE: 97.7 F | HEART RATE: 82 BPM

## 2024-03-19 DIAGNOSIS — Z72.0 TOBACCO USER: ICD-10-CM

## 2024-03-19 DIAGNOSIS — R73.09 ELEVATED HEMOGLOBIN A1C: ICD-10-CM

## 2024-03-19 DIAGNOSIS — I48.0 PAROXYSMAL ATRIAL FIBRILLATION: ICD-10-CM

## 2024-03-19 DIAGNOSIS — G89.29 CHRONIC PAIN OF LEFT KNEE: ICD-10-CM

## 2024-03-19 DIAGNOSIS — M25.562 CHRONIC PAIN OF LEFT KNEE: ICD-10-CM

## 2024-03-19 DIAGNOSIS — F41.9 ANXIETY: ICD-10-CM

## 2024-03-19 DIAGNOSIS — I25.10 CORONARY ARTERY DISEASE INVOLVING NATIVE CORONARY ARTERY OF NATIVE HEART WITHOUT ANGINA PECTORIS: ICD-10-CM

## 2024-03-19 DIAGNOSIS — G89.29 CHRONIC LEFT SHOULDER PAIN: ICD-10-CM

## 2024-03-19 DIAGNOSIS — I10 ESSENTIAL HYPERTENSION: Primary | ICD-10-CM

## 2024-03-19 DIAGNOSIS — M25.512 CHRONIC LEFT SHOULDER PAIN: ICD-10-CM

## 2024-03-19 DIAGNOSIS — E78.2 MIXED HYPERLIPIDEMIA: ICD-10-CM

## 2024-03-19 PROCEDURE — 1160F RVW MEDS BY RX/DR IN RCRD: CPT | Performed by: NURSE PRACTITIONER

## 2024-03-19 PROCEDURE — 3079F DIAST BP 80-89 MM HG: CPT | Performed by: NURSE PRACTITIONER

## 2024-03-19 PROCEDURE — 1159F MED LIST DOCD IN RCRD: CPT | Performed by: NURSE PRACTITIONER

## 2024-03-19 PROCEDURE — 99214 OFFICE O/P EST MOD 30 MIN: CPT | Performed by: NURSE PRACTITIONER

## 2024-03-19 PROCEDURE — 3074F SYST BP LT 130 MM HG: CPT | Performed by: NURSE PRACTITIONER

## 2024-03-19 RX ORDER — NICOTINE 21 MG/24HR
1 PATCH, TRANSDERMAL 24 HOURS TRANSDERMAL EVERY 24 HOURS
Qty: 28 EACH | Refills: 0 | Status: SHIPPED | OUTPATIENT
Start: 2024-03-19

## 2024-03-19 RX ORDER — DULOXETIN HYDROCHLORIDE 60 MG/1
60 CAPSULE, DELAYED RELEASE ORAL DAILY
Qty: 30 CAPSULE | Refills: 6 | Status: SHIPPED | OUTPATIENT
Start: 2024-03-19

## 2024-03-19 NOTE — PROGRESS NOTES
"Subjective   Denny Hutchins is a 53 y.o. male.     Chief Complaint   Patient presents with    Hypertension    Hyperlipidemia    Atrial Fibrillation    Coronary Artery Disease    Anxiety       PCP: Bere Dowling APRN    History of Present Illness   The patient is a 53-year-old male who is here for routine follow-up. He has hypertension, hyperlipidemia, atrial fibrillation, coronary artery disease, and anxiety. At his last visit, he was having some significant epigastric pain, so I started him on pantoprazole and referred for EGD. It looks like this was scheduled twice, but canceled by patient. He does follow with cardiology, Dr. Headley.    He had to cancel his EGD that was ordered last time. He was started on pantoprazole, which has helped. He denies any burning but has pain and soreness from his injury in 01/2024.    The patient reports in 01/2024 he was on a job and slipped and fell. He was packing some hardwood, which had a box out of his shoulder. The step he was going down into the basement and it snapped right in half. He waited approximately 3 weeks to get evaluated for his left shoulder. He is scheduled to get an MRI on his left shoulder in Thursday, 03/21/2024. His blood pressure was elevated when he was in the emergency room and was advised to contact me. He went to physical therapy, which did not help and was using a TENS unit.    He has pain and soreness in the lateral aspect of his left knee. He notes it is a lot better now, but it is still hurting. He states when he gets up, he has a \"little sharp pain like somebody stabbed me with a knife.\" He has to grab something to help himself get up due to his leg stiffness. He takes 2 Tylenol Arthritis in the morning and 2 at night, which does not help. He was taking 3 at a time, but when he went to the emergency room, they informed him not to take 3. He bought Biofreeze. He has not tried Voltaren gel. He states he thought he had more issues with his " back, but it has not felt to bad.    He has a cardiac follow up with Dr. Headley on 03/22/2024. He denies any chest pain, shortness of breath, unusual headaches, dizziness, or heart palpitations. He is still taking aspirin and Plavix.    He is requesting a refill on nicotine patches.    He notes he has not been receiving his duloxetine from his pharmacy.    He reports he never did the Cologuard; however, he still has it. He states he will have it done before his next visit.      He denies COVID-19, shingles, influenza, and pneumonia vaccine. He inquires what is the shingles vaccine.    The following portions of the patient's history were reviewed and updated as appropriate: allergies, current medications, past family history, past medical history, past social history, past surgical history and problem list.        Review of Systems   Constitutional:  Negative for fatigue, fever and unexpected weight loss.   Eyes:  Negative for blurred vision, double vision and visual disturbance.   Respiratory:  Negative for cough, shortness of breath and wheezing.    Cardiovascular:  Negative for chest pain, palpitations and leg swelling.   Gastrointestinal:  Negative for abdominal pain, constipation, diarrhea, nausea and vomiting.   Genitourinary:  Negative for difficulty urinating, frequency and urgency.   Musculoskeletal:  Positive for myalgias. Negative for arthralgias.   Skin:  Negative for color change and rash.   Neurological:  Negative for dizziness, weakness and headache.   Hematological:  Negative for adenopathy. Does not bruise/bleed easily.   Psychiatric/Behavioral:  The patient is nervous/anxious.            Outpatient Medications Marked as Taking for the 3/19/24 encounter (Office Visit) with Bere Dowling APRN   Medication Sig Dispense Refill    aspirin 81 MG EC tablet aspirin 81 mg tablet,delayed release   Take 1 tablet every day by oral route.      atorvastatin (LIPITOR) 80 MG tablet Take 1 tablet by mouth Daily.  30 tablet 6    clopidogrel (PLAVIX) 75 MG tablet TAKE 1 TABLET BY MOUTH EVERY DAY 90 tablet 3    cyclobenzaprine (FLEXERIL) 10 MG tablet Take 1 tablet by mouth 3 (Three) Times a Day As Needed for Muscle Spasms. 30 tablet 0    DULoxetine (CYMBALTA) 60 MG capsule Take 1 capsule by mouth Daily. 30 capsule 6    fenofibrate (TRICOR) 48 MG tablet TAKE 1 TABLET BY MOUTH EVERY DAY 90 tablet 3    losartan-hydrochlorothiazide (HYZAAR) 50-12.5 MG per tablet TAKE 1 TABLET BY MOUTH EVERY DAY 30 tablet 6    metoprolol tartrate (LOPRESSOR) 50 MG tablet TAKE 1 TABLET BY MOUTH TWICE A  tablet 3    nicotine (Nicoderm CQ) 14 MG/24HR patch Place 1 patch on the skin as directed by provider Daily. Start after completing the 21 mg patches 28 each 0    nicotine (Nicoderm CQ) 7 MG/24HR patch Place 1 patch on the skin as directed by provider Daily. Start after completing the 14 mg patches 28 each 0    pantoprazole (PROTONIX) 40 MG EC tablet Take 1 tablet by mouth Daily. 30 tablet 3    [DISCONTINUED] DULoxetine (CYMBALTA) 60 MG capsule Take 1 capsule by mouth Daily. 30 capsule 6     No Known Allergies        Objective   Physical Exam  Vitals and nursing note reviewed.   Constitutional:       Appearance: Normal appearance. He is well-developed.   HENT:      Head: Normocephalic and atraumatic.   Eyes:      Conjunctiva/sclera: Conjunctivae normal.   Cardiovascular:      Rate and Rhythm: Normal rate and regular rhythm.      Heart sounds: Normal heart sounds.   Pulmonary:      Effort: Pulmonary effort is normal. No respiratory distress.      Breath sounds: Normal breath sounds.   Abdominal:      General: Bowel sounds are normal. There is no distension.      Palpations: Abdomen is soft.      Tenderness: There is no abdominal tenderness.   Musculoskeletal:      Left shoulder: Decreased range of motion.      Cervical back: Normal range of motion.      Right knee: Crepitus present.      Left knee: Crepitus present. Decreased range of motion.     "  Comments: Small amount of swelling to the left popliteal region. No erythema or warmth.   Skin:     General: Skin is warm and dry.   Neurological:      Mental Status: He is alert and oriented to person, place, and time.   Psychiatric:         Speech: Speech normal.         Behavior: Behavior normal.         Thought Content: Thought content normal.         Judgment: Judgment normal.         Vitals:    03/19/24 0849   BP: 124/86   BP Location: Right arm   Patient Position: Sitting   Cuff Size: Adult   Pulse: 82   Resp: 18   Temp: 97.7 °F (36.5 °C)   TempSrc: Infrared   SpO2: 96%   Weight: 115 kg (253 lb 12.8 oz)   Height: 175.3 cm (69\")     Body mass index is 37.48 kg/m².  Wt Readings from Last 3 Encounters:   03/19/24 115 kg (253 lb 12.8 oz)   12/19/23 113 kg (249 lb 9.6 oz)   09/26/23 112 kg (248 lb)             Assessment & Plan   Diagnoses and all orders for this visit:    1. Essential hypertension (Primary)  -     CBC (No Diff); Future  -     Comprehensive Metabolic Panel; Future  -     Lipid Panel; Future  -     Hemoglobin A1c; Future    2. Tobacco user  -     nicotine (Nicoderm CQ) 14 MG/24HR patch; Place 1 patch on the skin as directed by provider Daily. Start after completing the 21 mg patches  Dispense: 28 each; Refill: 0  -     nicotine (Nicoderm CQ) 7 MG/24HR patch; Place 1 patch on the skin as directed by provider Daily. Start after completing the 14 mg patches  Dispense: 28 each; Refill: 0    3. Mixed hyperlipidemia  -     CBC (No Diff); Future  -     Comprehensive Metabolic Panel; Future  -     Lipid Panel; Future  -     Hemoglobin A1c; Future    4. Anxiety  -     CBC (No Diff); Future  -     Comprehensive Metabolic Panel; Future  -     Lipid Panel; Future  -     Hemoglobin A1c; Future  -     DULoxetine (CYMBALTA) 60 MG capsule; Take 1 capsule by mouth Daily.  Dispense: 30 capsule; Refill: 6    5. Coronary artery disease involving native coronary artery of native heart without angina pectoris  -     " CBC (No Diff); Future  -     Comprehensive Metabolic Panel; Future  -     Lipid Panel; Future  -     Hemoglobin A1c; Future    6. Paroxysmal atrial fibrillation  -     CBC (No Diff); Future  -     Comprehensive Metabolic Panel; Future  -     Lipid Panel; Future  -     Hemoglobin A1c; Future    7. Elevated hemoglobin A1c  -     CBC (No Diff); Future  -     Comprehensive Metabolic Panel; Future  -     Lipid Panel; Future  -     Hemoglobin A1c; Future    8. Chronic pain of left knee  -     Diclofenac Sodium (VOLTAREN) 1 % gel gel; Apply 4 g topically to the appropriate area as directed 4 (Four) Times a Day As Needed (knee pain).  Dispense: 100 g; Refill: 1    9. Chronic left shoulder pain      Hypertension.  It is well controlled. He will continue current regimen.    Hyperlipidemia.  He will continue statin. I will recheck lipids. He will follow a low-fat, low-cholesterol diet.    Anxiety.  This is well controlled. He will continue Cymbalta.    Coronary artery disease.  He has an appointment to see cardiology soon. He will continue aspirin and Plavix as well as statin.    Atrial fibrillation.  He will continue aspirin, Plavix, and follow up with cardiology. He will continue metoprolol.    Elevated A1c.   I will recheck with labs.    Chronic left knee pain.  He will see ortho again next week    Left shoulder pain  He has an MRI scheduled and will see ortho for this next week. I will go ahead and use some diclofenac gel for the knee pain, use sparingly. We discussed risks.    Follow-up  The patient will follow up in 3 months.    Return in about 3 months (around 6/19/2024) for chronic condition follow up, and need to collect labs today.    I discussed my findings,recommendations, and plan of care was with the patient. They verbalized understanding and agreement.  Patient was encouraged to keep me informed of any acute changes, lack of improvement, or any new concerning symptoms.     Transcribed from ambient dictation for  Bere Dowling, APRN by Nash Cohen.  03/19/24   10:09 EDT    Patient or patient representative verbalized consent to the visit recording.  I have personally performed the services described in this document as transcribed by the above individual, and it is both accurate and complete.  Bere Dowling, APRN  3/19/2024  12:50 EDT

## 2024-03-21 NOTE — PROGRESS NOTES
[facility]    Encounter Date: 2024    Patient ID: Denny Hutchins is a 53 y.o. male.  : 1970     PCP: Bere Dowling APRN       Chief Complaint: Coronary Artery Disease      PROBLEM LIST:  Coronary artery disease  NSTEMI, 2021  LHC, 2021: Subtotal proximal RCA occlusion. 3.5 x 18 mm Resolute [place] stent  Echo 2021: Normal LV size.  Mild LVH.  EF 50% with inferobasal akinesia. Abnormal systolic strain pattern. Abnormal RA size.  Dilated right ventricle with abnormal right ventricular function. Mildly dilated aortic root 3.89 cm. Mildly dilated ascending aorta 3.92 cm. Normal valvular morphology.  Paroxysmal atrial fibrillation  New onset 2021 during hospitalization for MI at [facility].  Started on amiodarone, metoprolol, and Eliquis. Amiodarone and Eliquis discontinued by Dr. Michaels at follow-up.  Event monitor, 2021: Monitored for 12 days. Baseline SR with PAC.   Dyslipidemia   Essential hypertension   Tobacco use   Needle phobia     History of Present Illness  Patient presents today for a follow-up with a history of CAD, PAF, and cardiac risk factors. Since last visit, the patient has done well from cardiac standpoint.  He runs a carpet business.  Remains fairly active and busy.  Denies current complaints of chest pain shortness of breath palpitations or syncope.  States that occasionally at nighttime he gets a little lightheaded with turning.  He does not sleep well.  He continues to smoke cigarettes less than 1 pack/day.    No Known Allergies      Current Outpatient Medications:   •  aspirin 81 MG EC tablet, aspirin 81 mg tablet,delayed release  Take 1 tablet every day by oral route., Disp: , Rfl:   •  atorvastatin (LIPITOR) 80 MG tablet, Take 1 tablet by mouth Daily., Disp: 30 tablet, Rfl: 6  •  clopidogrel (PLAVIX) 75 MG tablet, TAKE 1 TABLET BY MOUTH EVERY DAY, Disp: 90 tablet, Rfl: 3  •  cyclobenzaprine (FLEXERIL) 10 MG  "tablet, Take 1 tablet by mouth 3 (Three) Times a Day As Needed for Muscle Spasms., Disp: 30 tablet, Rfl: 0  •  Diclofenac Sodium (VOLTAREN) 1 % gel gel, Apply 4 g topically to the appropriate area as directed 4 (Four) Times a Day As Needed (knee pain)., Disp: 100 g, Rfl: 1  •  DULoxetine (CYMBALTA) 60 MG capsule, Take 1 capsule by mouth Daily., Disp: 30 capsule, Rfl: 6  •  fenofibrate (TRICOR) 48 MG tablet, TAKE 1 TABLET BY MOUTH EVERY DAY, Disp: 90 tablet, Rfl: 3  •  losartan-hydrochlorothiazide (HYZAAR) 50-12.5 MG per tablet, TAKE 1 TABLET BY MOUTH EVERY DAY, Disp: 30 tablet, Rfl: 6  •  metoprolol tartrate (LOPRESSOR) 50 MG tablet, TAKE 1 TABLET BY MOUTH TWICE A DAY, Disp: 180 tablet, Rfl: 3  •  nicotine (Nicoderm CQ) 14 MG/24HR patch, Place 1 patch on the skin as directed by provider Daily. Start after completing the 21 mg patches, Disp: 28 each, Rfl: 0  •  nicotine (Nicoderm CQ) 7 MG/24HR patch, Place 1 patch on the skin as directed by provider Daily. Start after completing the 14 mg patches, Disp: 28 each, Rfl: 0  •  pantoprazole (PROTONIX) 40 MG EC tablet, Take 1 tablet by mouth Daily., Disp: 30 tablet, Rfl: 3  •  omega-3 acid ethyl esters (LOVAZA) 1 g capsule, Take 2 capsules by mouth 2 (Two) Times a Day., Disp: 120 capsule, Rfl: 5    The following portions of the patient's history were reviewed and updated as appropriate: allergies, current medications, past family history, past medical history, past social history, past surgical history and problem list.    ROS  Review of Systems   14 point ROS negative except for that listed in the HPI.         Objective:     /62 (BP Location: Right arm, Patient Position: Sitting)   Pulse 67   Ht 177.8 cm (70\")   Wt 115 kg (253 lb)   SpO2 98%   BMI 36.30 kg/m²      Physical Exam  Constitutional: Patient appears well-developed and well-nourished.   HENT: HEENT exam unremarkable.   Neck: Neck supple. No JVD present. No carotid bruits.   Cardiovascular: Normal " rate, regular rhythm and normal heart sounds. No murmur heard.   2+ symmetric pulses.   Pulmonary/Chest: Breath sounds normal. Does not exhibit tenderness.   Abdominal: Abdomen benign.   Musculoskeletal: Does not exhibit edema.   Neurological: Neurological exam unremarkable.   Vitals reviewed.    Data Review:   Lab Results   Component Value Date    GLUCOSE 99 09/26/2023    BUN 15 09/26/2023    CREATININE 1.05 09/26/2023    EGFR 85.4 09/26/2023    BCR 14.3 09/26/2023     (L) 09/26/2023    K 4.4 09/26/2023    CO2 23.6 09/26/2023    CALCIUM 9.6 09/26/2023    ALBUMIN 4.8 09/26/2023    AST 20 09/26/2023    ALT 29 09/26/2023     Lab Results   Component Value Date    CHOL 156 09/26/2023    TRIG 336 (H) 09/26/2023    HDL 28 (L) 09/26/2023    LDL 74 09/26/2023      Lab Results   Component Value Date    WBC 8.57 09/26/2023    RBC 4.70 09/26/2023    HGB 15.6 09/26/2023    HCT 45.3 09/26/2023    MCV 96.4 09/26/2023     09/26/2023     Lab Results   Component Value Date    HGBA1C 5.70 (H) 09/26/2023          ECG 12 Lead    Date/Time: 3/22/2024 10:26 AM  Performed by: Gutierrez Headley MD    Authorized by: Gutierrez Headley MD  Comparison: compared with previous ECG from 1/6/2023  Similar to previous ECG  Rhythm: sinus rhythm    Clinical impression: normal ECG           Advance Care Planning   ACP discussion was declined by the patient. Patient does not have an advance directive, declines further assistance.           Assessment:      Diagnosis Plan   1. Coronary artery disease involving native coronary artery of native heart without angina pectoris  Stable, no current angina, continue current best tolerated GDMT.      2. Paroxysmal atrial fibrillation  No recurrences, continue dual antiplatelet therapy and metoprolol      3. Essential hypertension  Fair control, continue current antihypertensive therapy.      4. Dyslipidemia  His LDL was acceptable however triglycerides were elevated at last check.  We will add prescription  strength fish oil 2 g twice daily.  Continue rest of the current lipid-lowering therapy including Lipitor and fenofibrate.      5. Tobacco user  Smoking cessation strongly recommended, patient counseled for more than 3 minutes.        Plan:   Stable cardiac status.  No current angina or CHF symptoms.  Heart healthy diet, regular exercise and smoking cessation recommended.  Patient advised to discuss his sleep issues with PCP and consider a sleep evaluation.  Add Lovaza 2 g twice daily, continue rest of the current medications.   FU in 6 MO, sooner as needed.  Thank you for allowing us to participate in the care of your patient.     Gutierrez Headley MD, FACC, [facility]        Please note that portions of this note may have been completed with a voice recognition program. Efforts were made to edit the dictations, but occasionally words are mistranscribed.

## 2024-03-22 ENCOUNTER — OFFICE VISIT (OUTPATIENT)
Dept: CARDIOLOGY | Facility: CLINIC | Age: 54
End: 2024-03-22
Payer: COMMERCIAL

## 2024-03-22 VITALS
BODY MASS INDEX: 36.22 KG/M2 | OXYGEN SATURATION: 98 % | DIASTOLIC BLOOD PRESSURE: 62 MMHG | SYSTOLIC BLOOD PRESSURE: 108 MMHG | WEIGHT: 253 LBS | HEIGHT: 70 IN | HEART RATE: 67 BPM

## 2024-03-22 DIAGNOSIS — I10 ESSENTIAL HYPERTENSION: Chronic | ICD-10-CM

## 2024-03-22 DIAGNOSIS — I48.0 PAROXYSMAL ATRIAL FIBRILLATION: Chronic | ICD-10-CM

## 2024-03-22 DIAGNOSIS — E78.5 DYSLIPIDEMIA: ICD-10-CM

## 2024-03-22 DIAGNOSIS — Z72.0 TOBACCO USER: Chronic | ICD-10-CM

## 2024-03-22 DIAGNOSIS — I25.10 CORONARY ARTERY DISEASE INVOLVING NATIVE CORONARY ARTERY OF NATIVE HEART WITHOUT ANGINA PECTORIS: Primary | ICD-10-CM

## 2024-03-22 RX ORDER — OMEGA-3-ACID ETHYL ESTERS 1 G/1
2 CAPSULE, LIQUID FILLED ORAL 2 TIMES DAILY
Qty: 120 CAPSULE | Refills: 5 | Status: SHIPPED | OUTPATIENT
Start: 2024-03-22

## 2024-04-12 DIAGNOSIS — Z72.0 TOBACCO USER: ICD-10-CM

## 2024-04-12 RX ORDER — NICOTINE 21 MG/24HR
1 PATCH, TRANSDERMAL 24 HOURS TRANSDERMAL EVERY 24 HOURS
Qty: 28 PATCH | Refills: 0 | Status: SHIPPED | OUTPATIENT
Start: 2024-04-12

## 2024-04-18 ENCOUNTER — OUTSIDE FACILITY SERVICE (OUTPATIENT)
Dept: GASTROENTEROLOGY | Facility: CLINIC | Age: 54
End: 2024-04-18
Payer: COMMERCIAL

## 2024-04-18 PROCEDURE — 43239 EGD BIOPSY SINGLE/MULTIPLE: CPT | Performed by: INTERNAL MEDICINE

## 2024-04-19 RX ORDER — PANTOPRAZOLE SODIUM 40 MG/1
40 TABLET, DELAYED RELEASE ORAL DAILY
Qty: 30 TABLET | Refills: 3 | Status: SHIPPED | OUTPATIENT
Start: 2024-04-19

## 2024-04-19 NOTE — TELEPHONE ENCOUNTER
Rx Refill Note  Requested Prescriptions     Signed Prescriptions Disp Refills    pantoprazole (PROTONIX) 40 MG EC tablet 30 tablet 3     Sig: TAKE 1 TABLET BY MOUTH EVERY DAY     Authorizing Provider: MIKI STEWART     Ordering User: SINTIA ALMAZAN      Last office visit with prescribing clinician: 3/19/2024     Next office visit with prescribing clinician: 6/19/2024     Sintia Almazan MA  04/19/24, 17:37 EDT

## 2024-05-01 RX ORDER — PEG-3350, SODIUM SULFATE, SODIUM CHLORIDE, POTASSIUM CHLORIDE, SODIUM ASCORBATE AND ASCORBIC ACID 7.5-2.691G
KIT ORAL
Qty: 1 EACH | Refills: 0 | Status: SHIPPED | OUTPATIENT
Start: 2024-05-01

## 2024-05-13 ENCOUNTER — TELEPHONE (OUTPATIENT)
Dept: GASTROENTEROLOGY | Facility: CLINIC | Age: 54
End: 2024-05-13

## 2024-05-13 DIAGNOSIS — Z72.0 TOBACCO USER: ICD-10-CM

## 2024-05-13 RX ORDER — NICOTINE 21 MG/24HR
1 PATCH, TRANSDERMAL 24 HOURS TRANSDERMAL EVERY 24 HOURS
Qty: 28 PATCH | Refills: 0 | Status: SHIPPED | OUTPATIENT
Start: 2024-05-13

## 2024-05-13 NOTE — TELEPHONE ENCOUNTER
Caller: Denny Hutchins    Relationship to patient: Self    Best call back number:  [phone]         Type of visit: COLONOSCOPY         If rescheduling, when is the original appointment: 05/13/2024     Additional notes:PT WILL CALL BACK TO RESCHEDULE

## 2024-05-15 DIAGNOSIS — G89.29 CHRONIC PAIN OF LEFT KNEE: ICD-10-CM

## 2024-05-15 DIAGNOSIS — M25.562 CHRONIC PAIN OF LEFT KNEE: ICD-10-CM

## 2024-05-17 RX ORDER — LOSARTAN POTASSIUM AND HYDROCHLOROTHIAZIDE 12.5; 5 MG/1; MG/1
TABLET ORAL
Qty: 30 TABLET | Refills: 6 | Status: SHIPPED | OUTPATIENT
Start: 2024-05-17

## 2024-05-17 NOTE — TELEPHONE ENCOUNTER
Lab Results   Component Value Date    GLUCOSE 99 09/26/2023    BUN 15 09/26/2023    CREATININE 1.05 09/26/2023    EGFR 85.4 09/26/2023    BCR 14.3 09/26/2023    K 4.4 09/26/2023    CO2 23.6 09/26/2023    CALCIUM 9.6 09/26/2023    ALBUMIN 4.8 09/26/2023    BILITOT 0.5 09/26/2023    AST 20 09/26/2023    ALT 29 09/26/2023

## 2024-06-09 DIAGNOSIS — Z72.0 TOBACCO USER: ICD-10-CM

## 2024-06-11 RX ORDER — NICOTINE 21 MG/24HR
1 PATCH, TRANSDERMAL 24 HOURS TRANSDERMAL EVERY 24 HOURS
Qty: 28 PATCH | Refills: 0 | Status: SHIPPED | OUTPATIENT
Start: 2024-06-11

## 2024-06-11 NOTE — TELEPHONE ENCOUNTER
Rx Refill Note  Requested Prescriptions     Pending Prescriptions Disp Refills    nicotine (NICODERM CQ) 14 MG/24HR patch [Pharmacy Med Name: NICOTINE 14 MG/24HR PATCH] 28 patch 0     Sig: PLACE 1 PATCH ON THE SKIN AS DIRECTED BY PROVIDER DAILY. START AFTER COMPLETING THE 21 MG PATCHES      Last office visit with prescribing clinician: 3/19/2024   Last telemedicine visit with prescribing clinician: Visit date not found   Next office visit with prescribing clinician: 6/19/2024       Janine Story MA  06/11/24, 08:09 EDT

## 2024-06-13 RX ORDER — FENOFIBRATE 48 MG/1
TABLET, COATED ORAL
Qty: 90 TABLET | Refills: 3 | Status: SHIPPED | OUTPATIENT
Start: 2024-06-13

## 2024-07-08 ENCOUNTER — OFFICE VISIT (OUTPATIENT)
Dept: INTERNAL MEDICINE | Facility: CLINIC | Age: 54
End: 2024-07-08
Payer: COMMERCIAL

## 2024-07-08 VITALS
HEART RATE: 74 BPM | TEMPERATURE: 96.9 F | SYSTOLIC BLOOD PRESSURE: 130 MMHG | RESPIRATION RATE: 16 BRPM | BODY MASS INDEX: 36.51 KG/M2 | WEIGHT: 255 LBS | HEIGHT: 70 IN | DIASTOLIC BLOOD PRESSURE: 74 MMHG | OXYGEN SATURATION: 96 %

## 2024-07-08 DIAGNOSIS — F41.9 ANXIETY: ICD-10-CM

## 2024-07-08 DIAGNOSIS — I25.10 CORONARY ARTERY DISEASE INVOLVING NATIVE CORONARY ARTERY OF NATIVE HEART WITHOUT ANGINA PECTORIS: ICD-10-CM

## 2024-07-08 DIAGNOSIS — M25.512 CHRONIC LEFT SHOULDER PAIN: ICD-10-CM

## 2024-07-08 DIAGNOSIS — G89.29 CHRONIC LEFT SHOULDER PAIN: ICD-10-CM

## 2024-07-08 DIAGNOSIS — E78.2 MIXED HYPERLIPIDEMIA: ICD-10-CM

## 2024-07-08 DIAGNOSIS — Z72.0 TOBACCO USER: ICD-10-CM

## 2024-07-08 DIAGNOSIS — I10 ESSENTIAL HYPERTENSION: Primary | ICD-10-CM

## 2024-07-08 DIAGNOSIS — G89.29 CHRONIC PAIN OF LEFT KNEE: ICD-10-CM

## 2024-07-08 DIAGNOSIS — I48.0 PAROXYSMAL ATRIAL FIBRILLATION: ICD-10-CM

## 2024-07-08 DIAGNOSIS — M25.562 CHRONIC PAIN OF LEFT KNEE: ICD-10-CM

## 2024-07-08 PROCEDURE — 1160F RVW MEDS BY RX/DR IN RCRD: CPT | Performed by: NURSE PRACTITIONER

## 2024-07-08 PROCEDURE — 99214 OFFICE O/P EST MOD 30 MIN: CPT | Performed by: NURSE PRACTITIONER

## 2024-07-08 PROCEDURE — 1125F AMNT PAIN NOTED PAIN PRSNT: CPT | Performed by: NURSE PRACTITIONER

## 2024-07-08 PROCEDURE — 3075F SYST BP GE 130 - 139MM HG: CPT | Performed by: NURSE PRACTITIONER

## 2024-07-08 PROCEDURE — 3078F DIAST BP <80 MM HG: CPT | Performed by: NURSE PRACTITIONER

## 2024-07-08 PROCEDURE — 1159F MED LIST DOCD IN RCRD: CPT | Performed by: NURSE PRACTITIONER

## 2024-07-08 RX ORDER — NICOTINE 21 MG/24HR
1 PATCH, TRANSDERMAL 24 HOURS TRANSDERMAL EVERY 24 HOURS
Qty: 28 PATCH | Refills: 0 | Status: SHIPPED | OUTPATIENT
Start: 2024-07-08

## 2024-07-08 NOTE — TELEPHONE ENCOUNTER
Called and lvm for patient to return call, office number given.     RELAY:   Please schedule him for a follow-up visit with JW Lopez.

## 2024-07-08 NOTE — TELEPHONE ENCOUNTER
Name: Denny Hutchins      Relationship: Self      Best Callback Number:  [phone]       HUB PROVIDED THE RELAY MESSAGE FROM THE OFFICE      PATIENT: SCHEDULED PER NOTE    ADDITIONAL INFORMATION: 7/8/24 AT 2:30PM

## 2024-07-08 NOTE — PROGRESS NOTES
Subjective   Denny Hutchins is a 53 y.o. male.     Chief Complaint   Patient presents with    Hypertension    Anxiety    Hyperlipidemia       PCP: Bere Dowling APRN    History of Present Illness /Review of systems    History of Present Illness  The patient is a 53-year-old male who is here for follow-up on hypertension, anxiety, and hyperlipidemia.    The patient has not been monitoring his blood pressure at home. He denies experiencing headaches, dizziness, chest pain, or shortness of breath.    The patient reports persistent shoulder pain, for which he has been undergoing physical therapy. Despite the application of diclofenac gel, he reports no improvement in his shoulder pain. An MRI revealed a small rotator cuff tear. Following with ortho.    The patient's anxiety is well-managed with duloxetine.    The patient reports he has been prescribed fish oil by his cardiologist for his atrial fibrillation.    The patient is currently on fenofibrate and atorvastatin to manage his cholesterol levels.   He smokes 2 cigarettes a day.    Results  Imaging  Pt reports MRI showed a small tear in the rotator cuff.    The following portions of the patient's history were reviewed and updated as appropriate: allergies, current medications, past family history, past medical history, past social history, past surgical history and problem list.        Outpatient Medications Marked as Taking for the 7/8/24 encounter (Office Visit) with Bere Dowling APRN   Medication Sig Dispense Refill    aspirin 81 MG EC tablet aspirin 81 mg tablet,delayed release   Take 1 tablet every day by oral route.      atorvastatin (LIPITOR) 80 MG tablet Take 1 tablet by mouth Daily. 30 tablet 6    clopidogrel (PLAVIX) 75 MG tablet TAKE 1 TABLET BY MOUTH EVERY DAY 90 tablet 3    cyclobenzaprine (FLEXERIL) 10 MG tablet Take 1 tablet by mouth 3 (Three) Times a Day As Needed for Muscle Spasms. 30 tablet 0    Diclofenac Sodium (VOLTAREN) 1 % gel gel  "Apply 4 g topically to the appropriate area as directed 4 (Four) Times a Day As Needed (knee pain). 350 g 6    DULoxetine (CYMBALTA) 60 MG capsule Take 1 capsule by mouth Daily. 30 capsule 6    fenofibrate (TRICOR) 48 MG tablet TAKE 1 TABLET BY MOUTH EVERY DAY 90 tablet 3    losartan-hydrochlorothiazide (HYZAAR) 50-12.5 MG per tablet TAKE 1 TABLET BY MOUTH EVERY DAY 30 tablet 6    metoprolol tartrate (LOPRESSOR) 50 MG tablet TAKE 1 TABLET BY MOUTH TWICE A  tablet 3    nicotine (NICODERM CQ) 14 MG/24HR patch PLACE 1 PATCH ON THE SKIN AS DIRECTED BY PROVIDER DAILY. START AFTER COMPLETING THE 21 MG PATCHES 28 patch 0    nicotine (Nicoderm CQ) 7 MG/24HR patch Place 1 patch on the skin as directed by provider Daily. Start after completing the 14 mg patches 28 each 0    omega-3 acid ethyl esters (LOVAZA) 1 g capsule Take 2 capsules by mouth 2 (Two) Times a Day. 120 capsule 5    pantoprazole (PROTONIX) 40 MG EC tablet TAKE 1 TABLET BY MOUTH EVERY DAY 30 tablet 3    PEG-KCl-NaCl-NaSulf-Na Asc-C (MoviPrep) 100 g reconstituted solution powder Use as directed by provider for colonoscopy prep 1 each 0    [DISCONTINUED] Diclofenac Sodium (VOLTAREN) 1 % gel gel APPLY 4 G TOPICALLY TO THE APPROPRIATE AREA AS DIRECTED 4 (FOUR) TIMES A DAY AS NEEDED (KNEE PAIN). 100 g 1     No Known Allergies        Objective     Vitals:    07/08/24 1449 07/08/24 1518   BP: 142/80 130/74   BP Location: Right arm    Patient Position: Sitting    Cuff Size: Adult    Pulse: 74    Resp: 16    Temp: 96.9 °F (36.1 °C)    TempSrc: Infrared    SpO2: 96%    Weight: 116 kg (255 lb)    Height: 177.8 cm (70\")      Body mass index is 36.59 kg/m².  Wt Readings from Last 3 Encounters:   07/08/24 116 kg (255 lb)   03/22/24 115 kg (253 lb)   03/19/24 115 kg (253 lb 12.8 oz)     Physical Exam  Vitals and nursing note reviewed.   Constitutional:       Appearance: Normal appearance. He is well-developed.   HENT:      Head: Normocephalic and atraumatic.   Eyes:    "   Conjunctiva/sclera: Conjunctivae normal.   Cardiovascular:      Rate and Rhythm: Normal rate and regular rhythm.      Heart sounds: Normal heart sounds.   Pulmonary:      Effort: Pulmonary effort is normal. No respiratory distress.      Breath sounds: Normal breath sounds.   Abdominal:      General: Bowel sounds are normal. There is no distension.      Palpations: Abdomen is soft.      Tenderness: There is no abdominal tenderness.   Musculoskeletal:      Cervical back: Normal range of motion.   Skin:     General: Skin is warm and dry.   Neurological:      Mental Status: He is alert and oriented to person, place, and time.   Psychiatric:         Speech: Speech normal.         Behavior: Behavior normal.         Thought Content: Thought content normal.         Judgment: Judgment normal.             Assessment & Plan   Diagnoses and all orders for this visit:    1. Essential hypertension (Primary)    2. Chronic pain of left knee  -     Diclofenac Sodium (VOLTAREN) 1 % gel gel; Apply 4 g topically to the appropriate area as directed 4 (Four) Times a Day As Needed (knee pain).  Dispense: 350 g; Refill: 6    3. Mixed hyperlipidemia    4. Anxiety    5. Coronary artery disease involving native coronary artery of native heart without angina pectoris    6. Chronic left shoulder pain    7. Paroxysmal atrial fibrillation        Assessment & Plan  1. Hypertension/CAD/afib.  Cont current rx     2. Tob abuse   refill of the NicoDerm patch has been issued.     3.Shoulder pain.  Cont to follow with ortho    4.knee pain  Diclofenac gel has been refilled.    5. Anxiety   Stable. Cont current rx.     Follow-up  A follow-up appointment is scheduled for 3 months from now.            Return in about 3 months (around 10/8/2024) for Annual, and needs to return for labs within 1-2 weeks.    I discussed my findings,recommendations, and plan of care was with the patient. They verbalized understanding and agreement.  Patient was encouraged to  keep me informed of any acute changes, lack of improvement, or any new concerning symptoms.     Patient or patient representative verbalized consent for the use of Ambient Listening during the visit with  JW Roberson for chart documentation. 7/8/2024  19:26 EDT

## 2024-07-15 ENCOUNTER — TELEPHONE (OUTPATIENT)
Dept: INTERNAL MEDICINE | Facility: CLINIC | Age: 54
End: 2024-07-15
Payer: COMMERCIAL

## 2024-07-15 NOTE — TELEPHONE ENCOUNTER
Caller: Denny Hutchins    Relationship: Self    Best call back number: [phone]    What medication are you requesting: SOME KIND OF MEDICATION FOR MUSCLE SPASMS    What are your current symptoms: RAJEEV HORSES BACK TO BACK, CAN HARDLY WALK, INTERRUPTING HIS SLEEP    How long have you been experiencing symptoms: FEW WEEKS    If a prescription is needed, what is your preferred pharmacy and phone number: [id number] - [place], [place] - [address] [phone] [facility] [phone]      Additional notes: PATIENT FORGOT TO MENTION THIS AT HIS APPOINTMENT LAST MONDAY 07/08/2024

## 2024-07-17 ENCOUNTER — TELEMEDICINE (OUTPATIENT)
Dept: INTERNAL MEDICINE | Facility: CLINIC | Age: 54
End: 2024-07-17
Payer: COMMERCIAL

## 2024-07-17 DIAGNOSIS — M54.50 CHRONIC MIDLINE LOW BACK PAIN WITHOUT SCIATICA: ICD-10-CM

## 2024-07-17 DIAGNOSIS — M62.838 MUSCLE SPASMS OF BOTH LOWER EXTREMITIES: Primary | ICD-10-CM

## 2024-07-17 DIAGNOSIS — G89.29 CHRONIC PAIN OF LEFT KNEE: ICD-10-CM

## 2024-07-17 DIAGNOSIS — M25.562 CHRONIC PAIN OF LEFT KNEE: ICD-10-CM

## 2024-07-17 DIAGNOSIS — G89.29 CHRONIC MIDLINE LOW BACK PAIN WITHOUT SCIATICA: ICD-10-CM

## 2024-07-17 PROCEDURE — 1160F RVW MEDS BY RX/DR IN RCRD: CPT | Performed by: NURSE PRACTITIONER

## 2024-07-17 PROCEDURE — 99213 OFFICE O/P EST LOW 20 MIN: CPT | Performed by: NURSE PRACTITIONER

## 2024-07-17 PROCEDURE — 1159F MED LIST DOCD IN RCRD: CPT | Performed by: NURSE PRACTITIONER

## 2024-07-17 PROCEDURE — 1125F AMNT PAIN NOTED PAIN PRSNT: CPT | Performed by: NURSE PRACTITIONER

## 2024-07-17 RX ORDER — CYCLOBENZAPRINE HCL 10 MG
10 TABLET ORAL 3 TIMES DAILY PRN
Qty: 90 TABLET | Refills: 1 | Status: SHIPPED | OUTPATIENT
Start: 2024-07-17

## 2024-07-17 NOTE — PROGRESS NOTES
This was an audio and video enabled visit.   This provider is located at [facility] (through [facility]), [address], [place] [address] using a secure [employer] Video Visit through [facility] or [employer] (pt preference). Denny  is being seen remotely via telehealth  in [place]. Pt provided a secure environment for this session.  Denny may be asked to present for in office testing and/or evaluation if felt to be unsafe for telemedicine.    The provider identified herself /credentials as appropriate.   By proceeding with the telehealth visit, the patient consents to be seen remotely which allows for patient identifiable information to be sent to a third party as needed. The patient may refuse to be seen remotely at any time. The electronic data is encrypted and password protected, and the patient is advised of the potential risks to privacy not withstanding such measures.    You have chosen to receive care through a telehealth visit. Do you consent to use a video/audio connection for your medical care today? Yes      Subjective   Denny Hutchins is a 54 y.o. male.     Chief Complaint   Patient presents with    Spasms       History of Present Illness     History of Present Illness  The patient is a 53-year-old male being seen on video visit today with complaints of muscle spasms.    The patient reports experiencing severe leg cramps, reminiscent of Charley horses. This episode occurred on Saturday, during which he was unable to sleep for approximately an hour. He maintains a high water intake, primarily consisting of tea and water, and avoids soda and Gatorade due to increased thirst. 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 He has found some relief by consuming pickle juice. His warehouse temperature is recorded at 70 degrees. He has a long-standing history of muscle spasms, albeit less severe  than on Saturday night. His last blood work was conducted a week or two ago. He has exhausted his prescribed muscle relaxants.    The patient sought emergency care in [facility] following a fall, during which an x-ray of his left knee was performed. He describes a sharp, stabbing pain in the side of his left knee, which has been severe for the past 4 to 5 days. He has been using a knee brace, which has provided some relief. The pain is localized to the inside of his knee, rather than the outer side. He has been applying a topical cream to the back of his legs and side of his knee, which does not provide relief on the side.    Results  Imaging  Left knee x-ray from January showed some arthritic changes.    The following portions of the patient's history were reviewed and updated as appropriate: allergies, current medications, past family history, past medical history, past social history, past surgical history and problem list.            Outpatient Medications Marked as Taking for the 7/17/24 encounter (Telemedicine) with Bere Dowling APRN   Medication Sig Dispense Refill    cyclobenzaprine (FLEXERIL) 10 MG tablet Take 1 tablet by mouth 3 (Three) Times a Day As Needed for Muscle Spasms. 90 tablet 1     No Known Allergies        Objective     Physical Exam   Constitutional: He is oriented to person, place, and time. He appears well-developed and well-nourished. No distress.   HENT:   Head: Normocephalic and atraumatic.   Right Ear: External ear normal.   Left Ear: External ear normal.   Mouth/Throat: Oropharynx is clear and moist.   Eyes: Right eye exhibits no discharge. Left eye exhibits no discharge. No scleral icterus.   Neck: Neck normal appearance.  Pulmonary/Chest: Effort normal. No accessory muscle usage.  No respiratory distress.No use of oxygen by nasal cannula  Abdominal: Abdomen appears normal.   Neurological: He is alert and oriented to person, place, and time.   Skin: Skin is dry. He is not  diaphoretic. No erythema. No pallor.   Psychiatric: He has a normal mood and affect. His speech is normal and behavior is normal. Judgment normal. He is attentive.         There were no vitals filed for this visit.  There is no height or weight on file to calculate BMI.        Assessment & Plan     Diagnoses and all orders for this visit:    1. Muscle spasms of both lower extremities (Primary)  -     Magnesium; Future  -     cyclobenzaprine (FLEXERIL) 10 MG tablet; Take 1 tablet by mouth 3 (Three) Times a Day As Needed for Muscle Spasms.  Dispense: 90 tablet; Refill: 1    2. Chronic midline low back pain without sciatica  -     cyclobenzaprine (FLEXERIL) 10 MG tablet; Take 1 tablet by mouth 3 (Three) Times a Day As Needed for Muscle Spasms.  Dispense: 90 tablet; Refill: 1    3. Chronic pain of left knee  -     XR Knee 1 or 2 View Left; Future        Assessment & Plan  1. Muscle spasms.   A prescription for Flexeril has been issued. Additionally, a blood work order has been placed to eval any underlying causes    2. Left knee pain.  The patient's knee x-ray from 01/2024 revealed some arthritic changes. An updated x-ray of the left knee has been ordered.           Return for Next scheduled follow up.    I have reviewed the limitations of a telehealth visit (such as lack of a physical exam and unable to obtain vital signs) and advised the patient that they may need to follow up for an office visit should their symptoms or concerns persist, worsen, or change.  Patient was encouraged to keep me informed of any acute changes, lack of improvement, or any new concerning symptoms.   I discussed my findings,recommendations, and plan of care was with the patient. They verbalized understanding and agreement.    Patient or patient representative verbalized consent for the use of Ambient Listening during the visit with  JW Roberson for chart documentation. 7/28/2024  09:41 EDT

## 2024-07-30 DIAGNOSIS — I10 ESSENTIAL HYPERTENSION: ICD-10-CM

## 2024-07-30 RX ORDER — METOPROLOL TARTRATE 50 MG/1
TABLET, FILM COATED ORAL
Qty: 180 TABLET | Refills: 3 | Status: SHIPPED | OUTPATIENT
Start: 2024-07-30

## 2024-07-30 RX ORDER — CLOPIDOGREL BISULFATE 75 MG/1
TABLET ORAL
Qty: 90 TABLET | Refills: 3 | Status: SHIPPED | OUTPATIENT
Start: 2024-07-30

## 2024-07-31 ENCOUNTER — TELEPHONE (OUTPATIENT)
Dept: GASTROENTEROLOGY | Facility: CLINIC | Age: 54
End: 2024-07-31

## 2024-07-31 RX ORDER — SOD SULF/POT CHLORIDE/MAG SULF 1.479 G
1 TABLET ORAL ONCE
Qty: 24 TABLET | Refills: 0 | Status: SHIPPED | OUTPATIENT
Start: 2024-07-31 | End: 2024-07-31

## 2024-07-31 NOTE — TELEPHONE ENCOUNTER
Hub transferred call.Patient states that he can't drink that prep needs to canceled appt for today. Transferred to [address]. Patient requested a different prep. Was told will send in tables but his ins might not pay for them.

## 2024-08-01 RX ORDER — PEG-3350, SODIUM SULFATE, SODIUM CHLORIDE, POTASSIUM CHLORIDE, SODIUM ASCORBATE AND ASCORBIC ACID 7.5-2.691G
KIT ORAL
Qty: 1 EACH | Refills: 0 | Status: SHIPPED | OUTPATIENT
Start: 2024-08-01

## 2024-08-06 ENCOUNTER — TELEPHONE (OUTPATIENT)
Dept: GASTROENTEROLOGY | Facility: CLINIC | Age: 54
End: 2024-08-06
Payer: COMMERCIAL

## 2024-08-06 RX ORDER — SOD SULF/POT CHLORIDE/MAG SULF 1.479 G
1 TABLET ORAL ONCE
Qty: 24 TABLET | Refills: 0 | Status: SHIPPED | OUTPATIENT
Start: 2024-08-06 | End: 2024-08-06

## 2024-08-06 NOTE — TELEPHONE ENCOUNTER
Caller: Denny Hutchins     Relationship: self    Best call back number: [phone]    What is your medical concern? Pt requested tablets for prep but was sent moviprep for his procedure on Friday 8.09.please send tablet rx over to [facility] pharmacy in [place].     How long has this issue been going on?     Is your provider already aware of this issue?     Have you been treated for this issue?

## 2024-08-07 ENCOUNTER — LAB (OUTPATIENT)
Dept: INTERNAL MEDICINE | Facility: CLINIC | Age: 54
End: 2024-08-07
Payer: COMMERCIAL

## 2024-08-07 DIAGNOSIS — M62.838 MUSCLE SPASMS OF BOTH LOWER EXTREMITIES: ICD-10-CM

## 2024-08-07 DIAGNOSIS — F41.9 ANXIETY: ICD-10-CM

## 2024-08-07 DIAGNOSIS — E78.2 MIXED HYPERLIPIDEMIA: ICD-10-CM

## 2024-08-07 DIAGNOSIS — R73.09 ELEVATED HEMOGLOBIN A1C: ICD-10-CM

## 2024-08-07 DIAGNOSIS — I25.10 CORONARY ARTERY DISEASE INVOLVING NATIVE CORONARY ARTERY OF NATIVE HEART WITHOUT ANGINA PECTORIS: ICD-10-CM

## 2024-08-07 DIAGNOSIS — I10 ESSENTIAL HYPERTENSION: ICD-10-CM

## 2024-08-07 DIAGNOSIS — I48.0 PAROXYSMAL ATRIAL FIBRILLATION: ICD-10-CM

## 2024-08-07 LAB
DEPRECATED RDW RBC AUTO: 46.7 FL (ref 37–54)
ERYTHROCYTE [DISTWIDTH] IN BLOOD BY AUTOMATED COUNT: 13.2 % (ref 12.3–15.4)
HBA1C MFR BLD: 5.7 % (ref 4.8–5.6)
HCT VFR BLD AUTO: 42.6 % (ref 37.5–51)
HGB BLD-MCNC: 14.3 G/DL (ref 13–17.7)
MCH RBC QN AUTO: 32.4 PG (ref 26.6–33)
MCHC RBC AUTO-ENTMCNC: 33.6 G/DL (ref 31.5–35.7)
MCV RBC AUTO: 96.4 FL (ref 79–97)
PLATELET # BLD AUTO: 221 10*3/MM3 (ref 140–450)
PMV BLD AUTO: 9 FL (ref 6–12)
RBC # BLD AUTO: 4.42 10*6/MM3 (ref 4.14–5.8)
WBC NRBC COR # BLD AUTO: 7 10*3/MM3 (ref 3.4–10.8)

## 2024-08-07 PROCEDURE — 83036 HEMOGLOBIN GLYCOSYLATED A1C: CPT | Performed by: NURSE PRACTITIONER

## 2024-08-07 PROCEDURE — 36415 COLL VENOUS BLD VENIPUNCTURE: CPT | Performed by: NURSE PRACTITIONER

## 2024-08-07 PROCEDURE — 80053 COMPREHEN METABOLIC PANEL: CPT | Performed by: NURSE PRACTITIONER

## 2024-08-07 PROCEDURE — 85027 COMPLETE CBC AUTOMATED: CPT | Performed by: NURSE PRACTITIONER

## 2024-08-07 PROCEDURE — 83735 ASSAY OF MAGNESIUM: CPT | Performed by: NURSE PRACTITIONER

## 2024-08-07 PROCEDURE — 80061 LIPID PANEL: CPT | Performed by: NURSE PRACTITIONER

## 2024-08-08 ENCOUNTER — TELEPHONE (OUTPATIENT)
Dept: INTERNAL MEDICINE | Facility: CLINIC | Age: 54
End: 2024-08-08
Payer: COMMERCIAL

## 2024-08-08 LAB
ALBUMIN SERPL-MCNC: 4.2 G/DL (ref 3.5–5.2)
ALBUMIN/GLOB SERPL: 1.5 G/DL
ALP SERPL-CCNC: 84 U/L (ref 39–117)
ALT SERPL W P-5'-P-CCNC: 31 U/L (ref 1–41)
ANION GAP SERPL CALCULATED.3IONS-SCNC: 10 MMOL/L (ref 5–15)
AST SERPL-CCNC: 18 U/L (ref 1–40)
BILIRUB SERPL-MCNC: 0.4 MG/DL (ref 0–1.2)
BUN SERPL-MCNC: 12 MG/DL (ref 6–20)
BUN/CREAT SERPL: 13.5 (ref 7–25)
CALCIUM SPEC-SCNC: 9.5 MG/DL (ref 8.6–10.5)
CHLORIDE SERPL-SCNC: 104 MMOL/L (ref 98–107)
CHOLEST SERPL-MCNC: 160 MG/DL (ref 0–200)
CO2 SERPL-SCNC: 22 MMOL/L (ref 22–29)
CREAT SERPL-MCNC: 0.89 MG/DL (ref 0.76–1.27)
EGFRCR SERPLBLD CKD-EPI 2021: 102.5 ML/MIN/1.73
GLOBULIN UR ELPH-MCNC: 2.8 GM/DL
GLUCOSE SERPL-MCNC: 101 MG/DL (ref 65–99)
HDLC SERPL-MCNC: 28 MG/DL (ref 40–60)
LDLC SERPL CALC-MCNC: 89 MG/DL (ref 0–100)
LDLC/HDLC SERPL: 2.87 {RATIO}
MAGNESIUM SERPL-MCNC: 2.2 MG/DL (ref 1.6–2.6)
POTASSIUM SERPL-SCNC: 4.1 MMOL/L (ref 3.5–5.2)
PROT SERPL-MCNC: 7 G/DL (ref 6–8.5)
SODIUM SERPL-SCNC: 136 MMOL/L (ref 136–145)
TRIGL SERPL-MCNC: 258 MG/DL (ref 0–150)
VLDLC SERPL-MCNC: 43 MG/DL (ref 5–40)

## 2024-08-08 NOTE — TELEPHONE ENCOUNTER
Called and lvm for patient to return call, office number given.       RELAY:   ----- Message from Bere Dowling sent at 8/8/2024  9:43 AM EDT -----  Please let him know:   Your labs are in acceptable ranges/are stable. Please continue your current treatment plan and/or medications.

## 2024-08-08 NOTE — TELEPHONE ENCOUNTER
Name: Denny Hutchins      Relationship: Self      Best Callback Number: [phone]       HUB PROVIDED THE RELAY MESSAGE FROM THE OFFICE      PATIENT: VOICED UNDERSTANDING AND HAS NO FURTHER QUESTIONS AT THIS TIME    ADDITIONAL INFORMATION:

## 2024-08-10 DIAGNOSIS — Z72.0 TOBACCO USER: ICD-10-CM

## 2024-08-12 RX ORDER — NICOTINE 21 MG/24HR
1 PATCH, TRANSDERMAL 24 HOURS TRANSDERMAL EVERY 24 HOURS
Qty: 28 PATCH | Refills: 0 | Status: SHIPPED | OUTPATIENT
Start: 2024-08-12

## 2024-08-20 DIAGNOSIS — E78.2 MIXED HYPERLIPIDEMIA: ICD-10-CM

## 2024-08-20 RX ORDER — ATORVASTATIN CALCIUM 80 MG/1
80 TABLET, FILM COATED ORAL DAILY
Qty: 30 TABLET | Refills: 2 | Status: SHIPPED | OUTPATIENT
Start: 2024-08-20

## 2024-08-20 RX ORDER — PANTOPRAZOLE SODIUM 40 MG/1
40 TABLET, DELAYED RELEASE ORAL DAILY
Qty: 30 TABLET | Refills: 2 | Status: SHIPPED | OUTPATIENT
Start: 2024-08-20

## 2024-08-20 NOTE — TELEPHONE ENCOUNTER
Rx Refill Note  Requested Prescriptions     Pending Prescriptions Disp Refills    atorvastatin (LIPITOR) 80 MG tablet [Pharmacy Med Name: ATORVASTATIN 80 MG TABLET] 30 tablet 2     Sig: TAKE 1 TABLET BY MOUTH EVERY DAY    pantoprazole (PROTONIX) 40 MG EC tablet [Pharmacy Med Name: PANTOPRAZOLE SOD DR 40 MG TAB] 30 tablet 2     Sig: TAKE 1 TABLET BY MOUTH EVERY DAY      Last office visit with prescribing clinician: 7/8/2024   Last telemedicine visit with prescribing clinician: 7/17/2024   Next office visit with prescribing clinician: 10/23/2024     Adelaide Guzman  08/20/24, 07:43 EDT

## 2024-09-16 RX ORDER — OMEGA-3-ACID ETHYL ESTERS 1 G/1
CAPSULE, LIQUID FILLED ORAL
Qty: 120 CAPSULE | Refills: 5 | Status: SHIPPED | OUTPATIENT
Start: 2024-09-16

## 2024-09-20 DIAGNOSIS — Z72.0 TOBACCO USER: ICD-10-CM

## 2024-09-20 RX ORDER — NICOTINE 21 MG/24HR
1 PATCH, TRANSDERMAL 24 HOURS TRANSDERMAL EVERY 24 HOURS
Qty: 28 PATCH | Refills: 0 | Status: SHIPPED | OUTPATIENT
Start: 2024-09-20

## 2024-09-30 DIAGNOSIS — G89.29 CHRONIC MIDLINE LOW BACK PAIN WITHOUT SCIATICA: ICD-10-CM

## 2024-09-30 DIAGNOSIS — M62.838 MUSCLE SPASMS OF BOTH LOWER EXTREMITIES: ICD-10-CM

## 2024-09-30 DIAGNOSIS — M54.50 CHRONIC MIDLINE LOW BACK PAIN WITHOUT SCIATICA: ICD-10-CM

## 2024-09-30 RX ORDER — CYCLOBENZAPRINE HCL 10 MG
10 TABLET ORAL 3 TIMES DAILY PRN
Qty: 90 TABLET | Refills: 0 | Status: SHIPPED | OUTPATIENT
Start: 2024-09-30

## 2024-09-30 NOTE — TELEPHONE ENCOUNTER
Rx Refill Note  Requested Prescriptions     Pending Prescriptions Disp Refills    cyclobenzaprine (FLEXERIL) 10 MG tablet [Pharmacy Med Name: CYCLOBENZAPRINE 10 MG TABLET] 90 tablet 0     Sig: TAKE 1 TABLET BY MOUTH 3 TIMES A DAY AS NEEDED FOR MUSCLE SPASMS.      Last office visit with prescribing clinician: 7/8/2024   Last telemedicine visit with prescribing clinician: 7/17/2024   Next office visit with prescribing clinician: 10/23/2024       Adelaide Guzman  09/30/24, 14:31 EDT

## 2024-10-19 DIAGNOSIS — E78.2 MIXED HYPERLIPIDEMIA: ICD-10-CM

## 2024-10-21 RX ORDER — ATORVASTATIN CALCIUM 80 MG/1
80 TABLET, FILM COATED ORAL DAILY
Qty: 30 TABLET | Refills: 2 | Status: SHIPPED | OUTPATIENT
Start: 2024-10-21

## 2024-10-21 RX ORDER — PANTOPRAZOLE SODIUM 40 MG/1
40 TABLET, DELAYED RELEASE ORAL DAILY
Qty: 30 TABLET | Refills: 2 | Status: SHIPPED | OUTPATIENT
Start: 2024-10-21

## 2024-10-21 NOTE — TELEPHONE ENCOUNTER
Rx Refill Note  Requested Prescriptions     Pending Prescriptions Disp Refills    pantoprazole (PROTONIX) 40 MG EC tablet [Pharmacy Med Name: PANTOPRAZOLE SOD DR 40 MG TAB] 30 tablet 2     Sig: TAKE 1 TABLET BY MOUTH EVERY DAY    atorvastatin (LIPITOR) 80 MG tablet [Pharmacy Med Name: ATORVASTATIN 80 MG TABLET] 30 tablet 2     Sig: TAKE 1 TABLET BY MOUTH EVERY DAY      Last office visit with prescribing clinician: 7/8/2024   Last telemedicine visit with prescribing clinician: 7/17/2024   Next office visit with prescribing clinician: 10/23/2024       Zulay Forrester MA  10/21/24, 10:22 EDT

## 2024-10-23 ENCOUNTER — OFFICE VISIT (OUTPATIENT)
Dept: INTERNAL MEDICINE | Facility: CLINIC | Age: 54
End: 2024-10-23
Payer: COMMERCIAL

## 2024-10-23 VITALS
TEMPERATURE: 98.2 F | RESPIRATION RATE: 18 BRPM | OXYGEN SATURATION: 98 % | HEIGHT: 70 IN | SYSTOLIC BLOOD PRESSURE: 122 MMHG | HEART RATE: 72 BPM | DIASTOLIC BLOOD PRESSURE: 74 MMHG | WEIGHT: 242.6 LBS | BODY MASS INDEX: 34.73 KG/M2

## 2024-10-23 DIAGNOSIS — Z72.0 TOBACCO USER: ICD-10-CM

## 2024-10-23 DIAGNOSIS — F41.9 ANXIETY: ICD-10-CM

## 2024-10-23 DIAGNOSIS — E78.2 MIXED HYPERLIPIDEMIA: ICD-10-CM

## 2024-10-23 DIAGNOSIS — I48.0 PAROXYSMAL ATRIAL FIBRILLATION: ICD-10-CM

## 2024-10-23 DIAGNOSIS — R73.03 PREDIABETES: ICD-10-CM

## 2024-10-23 DIAGNOSIS — I25.10 CORONARY ARTERY DISEASE INVOLVING NATIVE CORONARY ARTERY OF NATIVE HEART WITHOUT ANGINA PECTORIS: ICD-10-CM

## 2024-10-23 DIAGNOSIS — Z00.00 ANNUAL PHYSICAL EXAM: Primary | ICD-10-CM

## 2024-10-23 DIAGNOSIS — I10 ESSENTIAL HYPERTENSION: ICD-10-CM

## 2024-10-23 DIAGNOSIS — L85.3 DRY SKIN: ICD-10-CM

## 2024-10-23 PROBLEM — Z98.61 HISTORY OF PERCUTANEOUS CORONARY INTERVENTION: Status: ACTIVE | Noted: 2021-02-24

## 2024-10-23 PROBLEM — M25.569 ARTHRALGIA OF KNEE: Status: ACTIVE | Noted: 2024-02-02

## 2024-10-23 PROBLEM — M25.512 PAIN IN JOINT OF LEFT SHOULDER: Status: ACTIVE | Noted: 2024-02-02

## 2024-10-23 PROCEDURE — 99396 PREV VISIT EST AGE 40-64: CPT | Performed by: NURSE PRACTITIONER

## 2024-10-23 PROCEDURE — 1125F AMNT PAIN NOTED PAIN PRSNT: CPT | Performed by: NURSE PRACTITIONER

## 2024-10-23 PROCEDURE — 1159F MED LIST DOCD IN RCRD: CPT | Performed by: NURSE PRACTITIONER

## 2024-10-23 PROCEDURE — 3078F DIAST BP <80 MM HG: CPT | Performed by: NURSE PRACTITIONER

## 2024-10-23 PROCEDURE — 3074F SYST BP LT 130 MM HG: CPT | Performed by: NURSE PRACTITIONER

## 2024-10-23 PROCEDURE — 1160F RVW MEDS BY RX/DR IN RCRD: CPT | Performed by: NURSE PRACTITIONER

## 2024-10-23 NOTE — PROGRESS NOTES
Patient Care Team:  Bere Dowling APRN as PCP - General (Nurse Practitioner)  Bere Dowling APRN as Nurse Practitioner (Nurse Practitioner)  Gutierrez Headley MD as Consulting Physician (Cardiology)  Kerwin Garcia PA-C (Dermatology)  Kayli Aranda PA-C as Physician Assistant (Physician Assistant)  Talia Tomlin PA-C (Physician Assistant)  Felice Wiseman MD as Consulting Physician (Gastroenterology)     Chief Complaint   Patient presents with    Annual Exam     Pt is fasting              Patient is a 54 y.o. male who presents for his yearly physical exam.     HPI  History of Present Illness  The patient is a 54-year-old male here for his annual examination.    He has  hypertension, atrial fibrillation, hyperlipidemia, coronary artery disease (CAD), osteoarthritis, and tobacco use. He follows up routinely with his cardiologist, Dr. Headley, with the last appointment on 03/22/2024. During that visit, all conditions were stable except for elevated triglycerides, leading to the addition of fish oil to his regimen, which includes atorvastatin and fenofibrate. He is due for another cardiology follow-up soon.  Has appointment for January 2025.  He continues to take aspirin and Plavix for his coronary artery disease and cholesterol management. He reports no chest pain, heart palpitations, or foot swelling. He is attempting to quit smoking and uses patches to aid in this process. He experiences occasional shortness of breath when attempting to take deep breaths. He has varicose veins in his right leg, which do not cause him discomfort.    He had an appointment for a colonoscopy with Dr. Wiseman but had to cancel it as he could not drink the prep. He has never undergone a colonoscopy and has no family history of colon cancer.  Cologuard was previously but he completed that either.  He opted to complete colonoscopy instead.  He has completed an esophagogastroduodenoscopy (EGD).    He is making efforts to  maintain a healthy diet and exercise routine, resulting in a weight loss of 13 pounds.    He is seeking a lotion for his dry skin. He has tried several different moisturizers but has not used CeraVe or Eucerin. He has never been diagnosed with psoriasis.  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  Dry skin areas are typically to his elbows knuckles and knees    He declines flu, pneumonia, and COVID-19 vaccinations in the clinic today.      Health maintenance/lifestyle:  Health Maintenance   Topic Date Due    COLORECTAL CANCER SCREENING  04/25/2023    ZOSTER VACCINE (1 of 2) 10/23/2024 (Originally 7/22/2021)    COVID-19 Vaccine (5 - 2023-24 season) 10/25/2024 (Originally 9/1/2024)    Pneumococcal Vaccine 0-64 (2 of 2 - PCV) 12/19/2024 (Originally 12/2/2021)    INFLUENZA VACCINE  03/31/2025 (Originally 8/1/2024)    BMI FOLLOWUP  12/19/2024    LIPID PANEL  08/07/2025    ANNUAL PHYSICAL  10/23/2025    TDAP/TD VACCINES (2 - Td or Tdap) 04/25/2032    HEPATITIS C SCREENING  Completed     Immunization History   Administered Date(s) Administered    COVID-19 (MODERNA) 1st,2nd,3rd Dose Monovalent 11/06/2021    COVID-19 (PFIZER) Purple Cap Monovalent 03/07/2021, 03/31/2021    COVID-19 (UNSPECIFIED) 03/07/2021    Pneumococcal Polysaccharide (PPSV23) 12/02/2020    Tdap 04/25/2022     Cancer-related family history is not on file.   reports being sexually active.  Social History     Tobacco Use   Smoking Status Every Day    Current packs/day: 0.50    Average packs/day: 0.5 packs/day for 28.8 years (14.4 ttl pk-yrs)    Types: Cigarettes    Start date: 1996    Passive exposure: Current   Smokeless Tobacco Never     Social History     Substance and Sexual Activity   Alcohol Use Yes    Alcohol/week: 16.0 standard drinks of alcohol    Types: 16 Cans of beer per week       Results  Laboratory Studies  Triglycerides were elevated. A1c was 5.7%. Triglycerides decreased from 336 to 258.      PHQ-2 Depression  Screening  Little interest or pleasure in doing things? Several days   Feeling down, depressed, or hopeless? Not at all   PHQ-2 Total Score 1             History  Social History     Socioeconomic History    Marital status: Single   Tobacco Use    Smoking status: Every Day     Current packs/day: 0.50     Average packs/day: 0.5 packs/day for 28.8 years (14.4 ttl pk-yrs)     Types: Cigarettes     Start date: 1996     Passive exposure: Current    Smokeless tobacco: Never   Vaping Use    Vaping status: Never Used   Substance and Sexual Activity    Alcohol use: Yes     Alcohol/week: 16.0 standard drinks of alcohol     Types: 16 Cans of beer per week    Drug use: Never    Sexual activity: Yes     Past Medical History:   Diagnosis Date    Acute right-sided low back pain without sciatica 8/17/2020    Anxiety     Cellulitis of right hand 11/8/2022    Hyperlipidemia     Hypertension       Past Surgical History:   Procedure Laterality Date    NO PAST SURGERIES        No Known Allergies   Family History   Problem Relation Age of Onset    Diabetes Mother     Diabetes type II Father     No Known Problems Brother        Current Outpatient Medications:     aspirin 81 MG EC tablet, aspirin 81 mg tablet,delayed release  Take 1 tablet every day by oral route., Disp: , Rfl:     atorvastatin (LIPITOR) 80 MG tablet, TAKE 1 TABLET BY MOUTH EVERY DAY, Disp: 30 tablet, Rfl: 2    clopidogrel (PLAVIX) 75 MG tablet, TAKE 1 TABLET BY MOUTH EVERY DAY, Disp: 90 tablet, Rfl: 3    Diclofenac Sodium (VOLTAREN) 1 % gel gel, Apply 4 g topically to the appropriate area as directed 4 (Four) Times a Day As Needed (knee pain)., Disp: 350 g, Rfl: 6    DULoxetine (CYMBALTA) 60 MG capsule, Take 1 capsule by mouth Daily., Disp: 30 capsule, Rfl: 6    fenofibrate (TRICOR) 48 MG tablet, TAKE 1 TABLET BY MOUTH EVERY DAY, Disp: 90 tablet, Rfl: 3    losartan-hydrochlorothiazide (HYZAAR) 50-12.5 MG per tablet, TAKE 1 TABLET BY MOUTH EVERY DAY, Disp: 30 tablet, Rfl:  "6    metoprolol tartrate (LOPRESSOR) 50 MG tablet, TAKE 1 TABLET BY MOUTH TWICE A DAY, Disp: 180 tablet, Rfl: 3    nicotine (NICODERM CQ) 14 MG/24HR patch, PLACE 1 PATCH ON THE SKIN AS DIRECTED BY PROVIDER DAILY. START AFTER COMPLETING THE 21 MG PATCHES, Disp: 28 patch, Rfl: 0    nicotine (Nicoderm CQ) 7 MG/24HR patch, Place 1 patch on the skin as directed by provider Daily. Start after completing the 14 mg patches, Disp: 28 each, Rfl: 0    omega-3 acid ethyl esters (LOVAZA) 1 g capsule, TAKE 2 CAPSULES BY MOUTH 2 TIMES A DAY., Disp: 120 capsule, Rfl: 5    pantoprazole (PROTONIX) 40 MG EC tablet, TAKE 1 TABLET BY MOUTH EVERY DAY, Disp: 30 tablet, Rfl: 2    cyclobenzaprine (FLEXERIL) 10 MG tablet, TAKE 1 TABLET BY MOUTH 3 TIMES A DAY AS NEEDED FOR MUSCLE SPASMS. (Patient not taking: Reported on 10/23/2024), Disp: 90 tablet, Rfl: 0    PEG-KCl-NaCl-NaSulf-Na Asc-C (MoviPrep) 100 g reconstituted solution powder, Use as directed by provider for colonoscopy prep (Patient not taking: Reported on 10/23/2024), Disp: 1 each, Rfl: 0                  /74 (BP Location: Left arm, Patient Position: Sitting, Cuff Size: Adult)   Pulse 72   Temp 98.2 °F (36.8 °C) (Infrared)   Resp 18   Ht 177.8 cm (70\")   Wt 110 kg (242 lb 9.6 oz)   SpO2 98%   BMI 34.81 kg/m²         Physical Exam  Vitals and nursing note reviewed.   Constitutional:       General: He is not in acute distress.     Appearance: Normal appearance. He is well-developed. He is not ill-appearing, toxic-appearing or diaphoretic.   HENT:      Head: Normocephalic and atraumatic.      Right Ear: Tympanic membrane and external ear normal.      Left Ear: Tympanic membrane and external ear normal.      Nose: Nose normal.      Mouth/Throat:      Lips: Pink. No lesions.      Mouth: Mucous membranes are moist.      Tongue: No lesions.      Palate: No mass.   Eyes:      General: Lids are normal. No scleral icterus.        Right eye: No discharge.         Left eye: No " discharge.      Conjunctiva/sclera: Conjunctivae normal.      Pupils: Pupils are equal, round, and reactive to light.   Neck:      Thyroid: No thyroid mass, thyromegaly or thyroid tenderness.      Vascular: No carotid bruit or JVD.      Trachea: No tracheal deviation.   Cardiovascular:      Rate and Rhythm: Normal rate and regular rhythm.      Chest Wall: PMI is not displaced. No thrill.      Pulses: No decreased pulses.      Heart sounds: Normal heart sounds. No murmur heard.     No friction rub. No gallop.   Pulmonary:      Effort: Pulmonary effort is normal. No accessory muscle usage or respiratory distress.      Breath sounds: Normal breath sounds.   Chest:      Chest wall: No tenderness.   Abdominal:      General: Bowel sounds are normal. There is no distension.      Palpations: Abdomen is soft. There is no mass.      Tenderness: There is no abdominal tenderness. There is no guarding or rebound.      Hernia: No hernia is present.   Genitourinary:     Comments: deferred  Musculoskeletal:         General: No tenderness or deformity. Normal range of motion.      Cervical back: Normal range of motion and neck supple.      Right lower leg: No edema.      Left lower leg: No edema.   Lymphadenopathy:      Cervical: No cervical adenopathy.   Skin:     General: Skin is warm and dry.      Capillary Refill: Capillary refill takes 2 to 3 seconds.      Coloration: Skin is not ashen, jaundiced, mottled or pale.      Findings: No abrasion, abscess, acne, bruising, erythema, laceration, petechiae, rash or wound. Rash is not crusting.             Comments: Dry skin noted to elbows knees and knuckles.  Callused areas across knuckles aware of.  No plaques   Neurological:      General: No focal deficit present.      Mental Status: He is alert and oriented to person, place, and time.      Coordination: Coordination normal.   Psychiatric:         Attention and Perception: Attention normal.         Mood and Affect: Mood normal.          Speech: Speech normal.         Behavior: Behavior normal. Behavior is cooperative.         Thought Content: Thought content normal.         Judgment: Judgment normal.                    Diagnoses and all orders for this visit:    1. Annual physical exam (Primary)    2. Mixed hyperlipidemia    3. Anxiety    4. Coronary artery disease involving native coronary artery of native heart without angina pectoris    5. Paroxysmal atrial fibrillation    6. Essential hypertension    7. Tobacco user    8. Dry skin    9. Prediabetes        Assessment & Plan  1. Annual examination.  His A1c was slightly elevated at 5.7%, indicating a prediabetic range. His triglycerides have improved from 336 to 258, and his LDL levels remain stable. He is scheduled to see Dr. Headley on 01/03/2025. He will contact Dr. Wiseman's office regarding an alternative preparation for his colonoscopy. If this is not feasible, he will inform me so that a Cologuard test can be ordered. He is advised to continue his current medications, maintain a healthy diet, exercise regularly, and continue his weight loss efforts.    2. Hypertension.  He is advised to continue his current medication regimen, including aspirin and Plavix, due to his coronary artery disease.    3. Atrial Fibrillation.  He is advised to continue his current medication regimen and follow up with his cardiologist, Dr. Headley.    4. Hyperlipidemia.  He is advised to continue taking atorvastatin and fenofibrate. Fish oil has been added to help lower his triglycerides.    5. Coronary Artery Disease (CAD).  He is advised to continue taking aspirin and Plavix. He reports no chest pain or heart palpitations.    6. Osteoarthritis.  He reports no new symptoms. He is advised to continue his current management plan.    7. Tobacco Abuse.  He reports smoking occasionally but uses nicotine patches. He is encouraged to continue efforts to quit smoking.    8. Dry Skin.  The condition appears to be more  work-related callus than psoriasis. He is advised to try CeraVe lotion for his dry skin. Samples of CeraVe lotion were provided.    Health Maintenance.  He declines flu, pneumonia, and Covid vaccinations today. He is advised to consider getting the Shingles vaccine at his pharmacy. He will contact Dr. Wiseman's office regarding an alternative preparation for his colonoscopy. If this is not feasible, he will inform me so that a Cologuard test can be ordered.    Follow-up  Return in 3 months for follow-up.       Labs  ordered as above- will notify of results and treat accordingly. If patient has not received results within one week via mychart or letter, they will notify my office    Follow up: Return in about 3 months (around 1/23/2025) for chronic condition follow up.  Plan of care discussed with pt. They verbalized understanding and agreement.     Electronically signed by : JW Roberson    10/23/2024   09:26 EDT           Patient or patient representative verbalized consent for the use of Ambient Listening during the visit with  JW Roberson for chart documentation. 10/23/2024  09:25 EDT

## 2025-01-03 ENCOUNTER — OFFICE VISIT (OUTPATIENT)
Dept: CARDIOLOGY | Facility: CLINIC | Age: 55
End: 2025-01-03
Payer: COMMERCIAL

## 2025-01-03 VITALS
BODY MASS INDEX: 35.36 KG/M2 | OXYGEN SATURATION: 97 % | SYSTOLIC BLOOD PRESSURE: 126 MMHG | DIASTOLIC BLOOD PRESSURE: 78 MMHG | HEART RATE: 76 BPM | HEIGHT: 70 IN | WEIGHT: 247 LBS

## 2025-01-03 DIAGNOSIS — I48.0 PAROXYSMAL ATRIAL FIBRILLATION: ICD-10-CM

## 2025-01-03 DIAGNOSIS — E78.5 DYSLIPIDEMIA: ICD-10-CM

## 2025-01-03 DIAGNOSIS — Z72.0 TOBACCO USER: ICD-10-CM

## 2025-01-03 DIAGNOSIS — I25.10 CORONARY ARTERY DISEASE INVOLVING NATIVE CORONARY ARTERY OF NATIVE HEART WITHOUT ANGINA PECTORIS: Primary | ICD-10-CM

## 2025-01-03 DIAGNOSIS — I10 ESSENTIAL HYPERTENSION: ICD-10-CM

## 2025-01-03 DIAGNOSIS — E78.2 MIXED HYPERLIPIDEMIA: ICD-10-CM

## 2025-01-03 PROBLEM — Z98.61 HISTORY OF PERCUTANEOUS CORONARY INTERVENTION: Status: RESOLVED | Noted: 2021-02-24 | Resolved: 2025-01-03

## 2025-01-03 PROBLEM — R93.1 ABNORMAL ECHOCARDIOGRAM: Status: RESOLVED | Noted: 2021-05-06 | Resolved: 2025-01-03

## 2025-01-03 RX ORDER — FENOFIBRATE 48 MG/1
48 TABLET, COATED ORAL DAILY
Qty: 90 TABLET | Refills: 3 | Status: SHIPPED | OUTPATIENT
Start: 2025-01-03

## 2025-01-03 RX ORDER — CLOPIDOGREL BISULFATE 75 MG/1
75 TABLET ORAL DAILY
Qty: 90 TABLET | Refills: 3 | Status: SHIPPED | OUTPATIENT
Start: 2025-01-03

## 2025-01-03 RX ORDER — METOPROLOL TARTRATE 50 MG
50 TABLET ORAL 2 TIMES DAILY
Qty: 180 TABLET | Refills: 3 | Status: SHIPPED | OUTPATIENT
Start: 2025-01-03

## 2025-01-03 RX ORDER — ATORVASTATIN CALCIUM 80 MG/1
80 TABLET, FILM COATED ORAL DAILY
Qty: 30 TABLET | Refills: 2 | Status: SHIPPED | OUTPATIENT
Start: 2025-01-03

## 2025-01-03 RX ORDER — LOSARTAN POTASSIUM AND HYDROCHLOROTHIAZIDE 12.5; 5 MG/1; MG/1
1 TABLET ORAL DAILY
Qty: 30 TABLET | Refills: 6 | Status: SHIPPED | OUTPATIENT
Start: 2025-01-03

## 2025-01-03 RX ORDER — OMEGA-3-ACID ETHYL ESTERS 1 G/1
2 CAPSULE, LIQUID FILLED ORAL DAILY
Qty: 120 CAPSULE | Refills: 5 | Status: SHIPPED | OUTPATIENT
Start: 2025-01-03

## 2025-01-03 RX ORDER — ASPIRIN 81 MG/1
81 TABLET ORAL DAILY
Qty: 90 TABLET | Refills: 1 | Status: SHIPPED | OUTPATIENT
Start: 2025-01-03

## 2025-01-03 NOTE — PROGRESS NOTES
[facility]    Encounter Date: 2025    Patient ID: Denny Hutchins is a 54 y.o. male.  : 1970     PCP: Bere Dowling APRN       Chief Complaint: Coronary artery disease involving native coronary artery of      PROBLEM LIST:  Coronary artery disease  NSTEMI, 2021  LHC, 2021: Subtotal proximal RCA occlusion. 3.5 x 18 mm Resolute Keith stent  Echo 2021: Normal LV size.  Mild LVH.  EF 50% with inferobasal akinesia. Abnormal systolic strain pattern. Abnormal RA size.  Dilated right ventricle with abnormal right ventricular function. Mildly dilated aortic root 3.89 cm. Mildly dilated ascending aorta 3.92 cm. Normal valvular morphology.  Paroxysmal atrial fibrillation  New onset 2021 during hospitalization for MI at [place].  Started on amiodarone, metoprolol, and Eliquis. Amiodarone and Eliquis discontinued by Dr. Michaels at follow-up.  Event monitor, 2021: Monitored for 12 days. Baseline SR with PAC.   Dyslipidemia   Essential hypertension   Tobacco use   Needle phobia     History of Present Illness  Patient presents today for a follow-up with a history of CAD, PAF, and cardiac risk factors. Since last visit, the patient has not had any hospitalizations or new medical diagnoses.  The patient had an NSTEMI in  which led to PCI of his right coronary artery.  During that hospital stay he had an episode of atrial fibrillation and was converted with IV amiodarone.  He was discharged with Eliquis but at follow-up his anticoagulation was discontinued.  Ever since then he has never had a reoccurrence of atrial fibrillation.  He denies chest pain, dyspnea, thigh apnea, palpitations or syncope.  He denies signs or symptoms TIA or CVA.  He continues to smoke cigarettes but understands the importance of quitting.  He actually uses a nicotine patch when he is at work since he is unable to smoke.  He is on dual antiplatelet therapy with aspirin  and Plavix and tolerating without reports of active or overt bleeding.  At his last visit Lovaza was added to his medical regimen.  He is due for a colonoscopy and is requesting cardiac clearance    No Known Allergies      Current Outpatient Medications:     aspirin 81 MG EC tablet, Take 1 tablet by mouth Daily., Disp: 90 tablet, Rfl: 1    atorvastatin (LIPITOR) 80 MG tablet, Take 1 tablet by mouth Daily., Disp: 30 tablet, Rfl: 2    clopidogrel (PLAVIX) 75 MG tablet, Take 1 tablet by mouth Daily., Disp: 90 tablet, Rfl: 3    cyclobenzaprine (FLEXERIL) 10 MG tablet, TAKE 1 TABLET BY MOUTH 3 TIMES A DAY AS NEEDED FOR MUSCLE SPASMS., Disp: 90 tablet, Rfl: 0    Diclofenac Sodium (VOLTAREN) 1 % gel gel, Apply 4 g topically to the appropriate area as directed 4 (Four) Times a Day As Needed (knee pain)., Disp: 350 g, Rfl: 6    DULoxetine (CYMBALTA) 60 MG capsule, Take 1 capsule by mouth Daily., Disp: 30 capsule, Rfl: 6    fenofibrate (TRICOR) 48 MG tablet, Take 1 tablet by mouth Daily., Disp: 90 tablet, Rfl: 3    losartan-hydrochlorothiazide (HYZAAR) 50-12.5 MG per tablet, Take 1 tablet by mouth Daily., Disp: 30 tablet, Rfl: 6    metoprolol tartrate (LOPRESSOR) 50 MG tablet, Take 1 tablet by mouth 2 (Two) Times a Day., Disp: 180 tablet, Rfl: 3    nicotine (NICODERM CQ) 14 MG/24HR patch, PLACE 1 PATCH ON THE SKIN AS DIRECTED BY PROVIDER DAILY. START AFTER COMPLETING THE 21 MG PATCHES, Disp: 28 patch, Rfl: 0    nicotine (Nicoderm CQ) 7 MG/24HR patch, Place 1 patch on the skin as directed by provider Daily. Start after completing the 14 mg patches, Disp: 28 each, Rfl: 0    omega-3 acid ethyl esters (LOVAZA) 1 g capsule, Take 2 capsules by mouth Daily., Disp: 120 capsule, Rfl: 5    pantoprazole (PROTONIX) 40 MG EC tablet, TAKE 1 TABLET BY MOUTH EVERY DAY, Disp: 30 tablet, Rfl: 2    PEG-KCl-NaCl-NaSulf-Na Asc-C (MoviPrep) 100 g reconstituted solution powder, Use as directed by provider for colonoscopy prep, Disp: 1 each, Rfl:  "0    The following portions of the patient's history were reviewed and updated as appropriate: allergies, current medications, past family history, past medical history, past social history, past surgical history and problem list.    ROS  Review of Systems   14 point ROS negative except for that listed in the HPI.         Objective:     /78   Pulse 76   Ht 177.8 cm (70\")   Wt 112 kg (247 lb)   SpO2 97%   BMI 35.44 kg/m²      Physical Exam  Constitutional: Patient appears well-developed and well-nourished.   HENT: HEENT exam unremarkable.   Neck: Neck supple. No JVD present. No carotid bruits.   Cardiovascular: Normal rate, regular rhythm and normal heart sounds. No murmur heard.   2+ symmetric pulses.   Pulmonary/Chest: Breath sounds normal. Does not exhibit tenderness.   Abdominal: Abdomen benign.   Musculoskeletal: Does not exhibit edema.   Neurological: Neurological exam unremarkable.   Vitals reviewed.    Data Review:   Lab Results   Component Value Date    GLUCOSE 101 (H) 08/07/2024    BUN 12 08/07/2024    CREATININE 0.89 08/07/2024    EGFR 102.5 08/07/2024    BCR 13.5 08/07/2024     08/07/2024    K 4.1 08/07/2024    CO2 22.0 08/07/2024    CALCIUM 9.5 08/07/2024    ALBUMIN 4.2 08/07/2024    AST 18 08/07/2024    ALT 31 08/07/2024     Lab Results   Component Value Date    CHOL 160 08/07/2024    TRIG 258 (H) 08/07/2024    HDL 28 (L) 08/07/2024    LDL 89 08/07/2024      Lab Results   Component Value Date    WBC 7.00 08/07/2024    RBC 4.42 08/07/2024    HGB 14.3 08/07/2024    HCT 42.6 08/07/2024    MCV 96.4 08/07/2024     08/07/2024     Lab Results   Component Value Date    HGBA1C 5.70 (H) 08/07/2024        Procedures       Advance Care Planning   ACP discussion was held with the patient during this visit. Patient does not have an advance directive, information provided.           Assessment:      Diagnosis Plan   1. Coronary artery disease involving native coronary artery of native heart " without angina pectoris  No signs or symptoms of angina or heart failure  Continue aspirin 81 mg daily  Continue Plavix 75 mg daily      2. Paroxysmal atrial fibrillation  No recurrence of atrial fibrillation since NSTEMI in 2021.  Since no reoccurrence no NOAC needed        3. Essential hypertension  metoprolol tartrate (LOPRESSOR) 50 MG tablet  Continue losartan/hydrochlorothiazide 50/12.5 mg 1 tablet daily      4. Dyslipidemia  Continue Tricor 48 mg daily  Continue Lovaza 1 g 2 capsules daily  Lipitor 80 mg daily        5. Tobacco user  Recommend immediate smoking cessation      6. Mixed hyperlipidemia  atorvastatin (LIPITOR) 80 MG tablet        Plan:   Stable cardiac status.   Continue current medications.   FU in 6 MO, sooner as needed.  Thank you for allowing us to participate in the care of your patient.       JW Geronimo      Please note that portions of this note may have been completed with a voice recognition program. Efforts were made to edit the dictations, but occasionally words are mistranscribed.

## 2025-01-07 ENCOUNTER — OFFICE VISIT (OUTPATIENT)
Dept: INTERNAL MEDICINE | Facility: CLINIC | Age: 55
End: 2025-01-07
Payer: COMMERCIAL

## 2025-01-07 VITALS
HEART RATE: 74 BPM | TEMPERATURE: 96.8 F | BODY MASS INDEX: 35.44 KG/M2 | OXYGEN SATURATION: 96 % | HEIGHT: 70 IN | SYSTOLIC BLOOD PRESSURE: 142 MMHG | RESPIRATION RATE: 14 BRPM | DIASTOLIC BLOOD PRESSURE: 80 MMHG

## 2025-01-07 DIAGNOSIS — M54.42 ACUTE LEFT-SIDED LOW BACK PAIN WITH LEFT-SIDED SCIATICA: Primary | ICD-10-CM

## 2025-01-07 DIAGNOSIS — I10 ESSENTIAL HYPERTENSION: ICD-10-CM

## 2025-01-07 PROCEDURE — 99214 OFFICE O/P EST MOD 30 MIN: CPT | Performed by: NURSE PRACTITIONER

## 2025-01-07 PROCEDURE — 1125F AMNT PAIN NOTED PAIN PRSNT: CPT | Performed by: NURSE PRACTITIONER

## 2025-01-07 PROCEDURE — 3077F SYST BP >= 140 MM HG: CPT | Performed by: NURSE PRACTITIONER

## 2025-01-07 PROCEDURE — 96372 THER/PROPH/DIAG INJ SC/IM: CPT | Performed by: NURSE PRACTITIONER

## 2025-01-07 PROCEDURE — 3079F DIAST BP 80-89 MM HG: CPT | Performed by: NURSE PRACTITIONER

## 2025-01-07 RX ORDER — KETOROLAC TROMETHAMINE 30 MG/ML
60 INJECTION, SOLUTION INTRAMUSCULAR; INTRAVENOUS ONCE
Status: COMPLETED | OUTPATIENT
Start: 2025-01-07 | End: 2025-01-07

## 2025-01-07 RX ORDER — PREDNISONE 10 MG/1
TABLET ORAL
Qty: 48 EACH | Refills: 0 | Status: SHIPPED | OUTPATIENT
Start: 2025-01-07

## 2025-01-07 RX ADMIN — KETOROLAC TROMETHAMINE 60 MG: 30 INJECTION, SOLUTION INTRAMUSCULAR; INTRAVENOUS at 13:27

## 2025-01-07 NOTE — PROGRESS NOTES
Subjective   Denny Hutchins is a 54 y.o. male.     Chief Complaint   Patient presents with    er follow up     Sciatica-still having trouble with left hip/leg.        PCP: Bere Dowling APRN    History of Present Illness     History of Present Illness  The patient is a 54-year-old male here for an ER follow-up after being seen on 01/03/2025 for sciatica.    He had an x-ray in the ER on 01/03/2025, which showed degenerative disc disease of the lumbar region. He continues to experience pain and discomfort in his left hip and leg, which he describes as radiating from his back down to his left lower  leg. The pain does not extend to his toes, and he reports no groin pain. He has not undergone physical therapy for his sciatica or back issues. He reports difficulty standing due to the pain, which has been ongoing for approximately one week. He reports no numbness or tingling /saddle anesthesia and no bowel or bladder accidents. He was administered 3 steroid injections in the ER, which provided temporary relief. However, he reports that the pain medication prescribed did not take effect until the following morning, resulting in sleep deprivation for 3 to 4 days. He has been using cyclobenzaprine for muscle spasms and applying Voltaren gel, but these interventions have not alleviated his symptoms. He has been taking Tylenol Arthritis Pain 620 mg, 2 tablets every 4 to 6 hours, without significant relief. He recalls a fall approximately a year ago, which resulted in back pain that subsequently improved.    His blood pressure is slightly elevated today, although it appears to be better than the readings taken in the ER. He does not monitor his blood pressure at home as he does not have a cuff.    Supplemental Information  He also reports intermittent sharp knee pain, which he attributes to a previous injury where a piece of wood struck his knee.    MEDICATIONS  Current: Percocet, lidocaine patches, Flexeril, Voltaren  gel, Tylenol    Results  Laboratory Studies  Kidney function is good on last labs    Imaging  X-ray in ER 1/3/25  showed degenerative disc disease of the lumbar region.    Lab Results   Component Value Date    WBC 7.00 08/07/2024    HGB 14.3 08/07/2024    HCT 42.6 08/07/2024    MCV 96.4 08/07/2024     08/07/2024     Lab Results   Component Value Date    GLUCOSE 101 (H) 08/07/2024    BUN 12 08/07/2024    CREATININE 0.89 08/07/2024    EGFR 102.5 08/07/2024    BCR 13.5 08/07/2024    K 4.1 08/07/2024    CO2 22.0 08/07/2024    CALCIUM 9.5 08/07/2024    ALBUMIN 4.2 08/07/2024    BILITOT 0.4 08/07/2024    AST 18 08/07/2024    ALT 31 08/07/2024     Lab Results   Component Value Date    CHOL 160 08/07/2024    TRIG 258 (H) 08/07/2024    HDL 28 (L) 08/07/2024    LDL 89 08/07/2024     Lab Results   Component Value Date    TSH 1.340 08/21/2020     A1C Last 3 Results          8/7/2024    11:22   HGBA1C Last 3 Results   Hemoglobin A1C 5.70        The following portions of the patient's history were reviewed and updated as appropriate: allergies, current medications, past family history, past medical history, past social history, past surgical history and problem list.              Outpatient Medications Marked as Taking for the 1/7/25 encounter (Office Visit) with Bere Dowling APRN   Medication Sig Dispense Refill    aspirin 81 MG EC tablet Take 1 tablet by mouth Daily. 90 tablet 1    atorvastatin (LIPITOR) 80 MG tablet Take 1 tablet by mouth Daily. 30 tablet 2    clopidogrel (PLAVIX) 75 MG tablet Take 1 tablet by mouth Daily. 90 tablet 3    cyclobenzaprine (FLEXERIL) 10 MG tablet TAKE 1 TABLET BY MOUTH 3 TIMES A DAY AS NEEDED FOR MUSCLE SPASMS. 90 tablet 0    Diclofenac Sodium (VOLTAREN) 1 % gel gel Apply 4 g topically to the appropriate area as directed 4 (Four) Times a Day As Needed (knee pain). 350 g 6    DULoxetine (CYMBALTA) 60 MG capsule Take 1 capsule by mouth Daily. 30 capsule 6    fenofibrate (TRICOR) 48 MG  "tablet Take 1 tablet by mouth Daily. 90 tablet 3    losartan-hydrochlorothiazide (HYZAAR) 50-12.5 MG per tablet Take 1 tablet by mouth Daily. 30 tablet 6    metoprolol tartrate (LOPRESSOR) 50 MG tablet Take 1 tablet by mouth 2 (Two) Times a Day. 180 tablet 3    nicotine (NICODERM CQ) 14 MG/24HR patch PLACE 1 PATCH ON THE SKIN AS DIRECTED BY PROVIDER DAILY. START AFTER COMPLETING THE 21 MG PATCHES 28 patch 0    nicotine (Nicoderm CQ) 7 MG/24HR patch Place 1 patch on the skin as directed by provider Daily. Start after completing the 14 mg patches 28 each 0    omega-3 acid ethyl esters (LOVAZA) 1 g capsule Take 2 capsules by mouth Daily. 120 capsule 5    pantoprazole (PROTONIX) 40 MG EC tablet TAKE 1 TABLET BY MOUTH EVERY DAY 30 tablet 2     Current Facility-Administered Medications for the 1/7/25 encounter (Office Visit) with Bere Dowling APRN   Medication Dose Route Frequency Provider Last Rate Last Admin    ketorolac (TORADOL) injection 60 mg  60 mg Intramuscular Once Bere Dowling APRN         No Known Allergies        Objective     Vitals:    01/07/25 1252   BP: 142/80   Pulse: 74   Resp: 14   Temp: 96.8 °F (36 °C)   TempSrc: Infrared   SpO2: 96%   Weight: Comment: pt declined today   Height: 177.8 cm (70\")     Body mass index is 35.44 kg/m².  Wt Readings from Last 3 Encounters:   01/03/25 112 kg (247 lb)   10/23/24 110 kg (242 lb 9.6 oz)   07/08/24 116 kg (255 lb)       Physical Exam  Vitals and nursing note reviewed.   Constitutional:       Appearance: Normal appearance. He is well-developed.   HENT:      Head: Normocephalic and atraumatic.   Eyes:      Conjunctiva/sclera: Conjunctivae normal.   Cardiovascular:      Rate and Rhythm: Normal rate and regular rhythm.      Heart sounds: Normal heart sounds.   Pulmonary:      Effort: Pulmonary effort is normal. No respiratory distress.      Breath sounds: Normal breath sounds.   Abdominal:      General: Bowel sounds are normal. There is no distension.      " Palpations: Abdomen is soft.      Tenderness: There is no abdominal tenderness.   Musculoskeletal:      Cervical back: Normal range of motion.      Lumbar back: Spasms and tenderness present. No swelling, edema, deformity, signs of trauma, lacerations or bony tenderness. Normal range of motion. Positive left straight leg raise test. No scoliosis.      Comments: Antalgic gait   Skin:     General: Skin is warm and dry.   Neurological:      Mental Status: He is alert and oriented to person, place, and time.   Psychiatric:         Speech: Speech normal.         Behavior: Behavior normal.         Thought Content: Thought content normal.         Judgment: Judgment normal.             Assessment & Plan   Diagnoses and all orders for this visit:    1. Acute left-sided low back pain with left-sided sciatica (Primary)  -     ketorolac (TORADOL) injection 60 mg  -     Ambulatory Referral to Physical Therapy for Evaluation & Treatment  -     predniSONE (DELTASONE) 10 MG (48) dose pack; As directed on package  Dispense: 48 each; Refill: 0    2. Essential hypertension        Assessment & Plan  1. Sciatica.  The patient's symptoms align with a diagnosis of sciatica, as evidenced by the pain distribution from the lower back to the mid-calf along the sciatic nerve pathway. The x-ray results indicate no significant abnormalities aside from DDD. He has been advised to engage in gentle back stretches and to avoid prolonged bed rest. He has been encouraged to continue his Tylenol regimen. His renal function is within normal limits. A Toradol injection will be administered today. A prednisone dose pack has been prescribed to manage inflammation. He will continue his current muscle relaxant regimen, specifically cyclobenzaprine. A referral for physical therapy has been made. If the condition persists, a consultation with a pain management specialist may be considered     2. Elevated blood pressure.  His blood pressure is slightly elevated  today, although it appears to be better than the readings taken in the ER. He has been advised to continue his current antihypertensive medication regimen. Should he experience symptoms such as headaches, dizziness, chest pain, or shortness of breath, he is to seek immediate medical attention.    Follow-up  The patient will follow up in 2 weeks.        Return for Next scheduled follow up.    I discussed my findings,recommendations, and plan of care was with the patient. They verbalized understanding and agreement.  Patient was encouraged to keep me informed of any acute changes, lack of improvement, or any new concerning symptoms.     Patient or patient representative verbalized consent for the use of Ambient Listening during the visit with  JW Roberson for chart documentation. 1/7/2025  13:18 EST

## 2025-01-07 NOTE — PATIENT INSTRUCTIONS
Back Exercises  These exercises help to make your trunk and back strong. They also help to keep the lower back flexible. Doing these exercises can help to prevent or lessen pain in your lower back.  If you have back pain, try to do these exercises 2-3 times each day or as told by your doctor.  As you get better, do the exercises once each day. Repeat the exercises more often as told by your doctor.  To stop back pain from coming back, do the exercises once each day, or as told by your doctor.  Do exercises exactly as told by your doctor. Stop right away if you feel sudden pain or your pain gets worse.  Exercises  Single knee to chest  Do these steps 3-5 times in a row for each leg:  Lie on your back on a firm bed or the floor with your legs stretched out.  Bring one knee to your chest.  Grab your knee or thigh with both hands and hold it in place.  Pull on your knee until you feel a gentle stretch in your lower back or butt.  Keep doing the stretch for 10-30 seconds.  Slowly let go of your leg and straighten it.  Pelvic tilt  Do these steps 5-10 times in a row:  Lie on your back on a firm bed or the floor with your legs stretched out.  Bend your knees so they point up to the ceiling. Your feet should be flat on the floor.  Tighten your lower belly (abdomen) muscles to press your lower back against the floor. This will make your tailbone point up to the ceiling instead of pointing down to your feet or the floor.  Stay in this position for 5-10 seconds while you gently tighten your muscles and breathe evenly.  Cat-cow  Do these steps until your lower back bends more easily:  Get on your hands and knees on a firm bed or the floor. Keep your hands under your shoulders, and keep your knees under your hips. You may put padding under your knees.  Let your head hang down toward your chest. Tighten (contract) the muscles in your belly. Point your tailbone toward the floor so your lower back becomes rounded like the back of a  cat.  Stay in this position for 5 seconds.  Slowly lift your head. Let the muscles of your belly relax. Point your tailbone up toward the ceiling so your back forms a sagging arch like the back of a cow.  Stay in this position for 5 seconds.    Press-ups  Do these steps 5-10 times in a row:  Lie on your belly (face-down) on a firm bed or the floor.  Place your hands near your head, about shoulder-width apart.  While you keep your back relaxed and keep your hips on the floor, slowly straighten your arms to raise the top half of your body and lift your shoulders. Do not use your back muscles. You may change where you place your hands to make yourself more comfortable.  Stay in this position for 5 seconds. Keep your back relaxed.  Slowly return to lying flat on the floor.    Bridges  Do these steps 10 times in a row:  Lie on your back on a firm bed or the floor.  Bend your knees so they point up to the ceiling. Your feet should be flat on the floor. Your arms should be flat at your sides, next to your body.  Tighten your butt muscles and lift your butt off the floor until your waist is almost as high as your knees. If you do not feel the muscles working in your butt and the back of your thighs, slide your feet 1-2 inches (2.5-5 cm) farther away from your butt.  Stay in this position for 3-5 seconds.  Slowly lower your butt to the floor, and let your butt muscles relax.  If this exercise is too easy, try doing it with your arms crossed over your chest.  Belly crunches  Do these steps 5-10 times in a row:  Lie on your back on a firm bed or the floor with your legs stretched out.  Bend your knees so they point up to the ceiling. Your feet should be flat on the floor.  Cross your arms over your chest.  Tip your chin a little bit toward your chest, but do not bend your neck.  Tighten your belly muscles and slowly raise your chest just enough to lift your shoulder blades a tiny bit off the floor. Avoid raising your body  higher than that because it can put too much stress on your lower back.  Slowly lower your chest and your head to the floor.  Back lifts  Do these steps 5-10 times in a row:  Lie on your belly (face-down) with your arms at your sides, and rest your forehead on the floor.  Tighten the muscles in your legs and your butt.  Slowly lift your chest off the floor while you keep your hips on the floor. Keep the back of your head in line with the curve in your back. Look at the floor while you do this.  Stay in this position for 3-5 seconds.  Slowly lower your chest and your face to the floor.  Contact a doctor if:  Your back pain gets a lot worse when you do an exercise.  Your back pain does not get better within 2 hours after you exercise.  If you have any of these problems, stop doing the exercises. Do not do them again unless your doctor says it is okay.  Get help right away if:  You have sudden, very bad back pain. If this happens, stop doing the exercises. Do not do them again unless your doctor says it is okay.  This information is not intended to replace advice given to you by your health care provider. Make sure you discuss any questions you have with your health care provider.  Document Revised: 03/02/2022 Document Reviewed: 03/02/2022  Elsevier Patient Education © 2024 [employer]

## 2025-01-24 ENCOUNTER — OFFICE VISIT (OUTPATIENT)
Dept: INTERNAL MEDICINE | Facility: CLINIC | Age: 55
End: 2025-01-24
Payer: COMMERCIAL

## 2025-01-24 VITALS
SYSTOLIC BLOOD PRESSURE: 122 MMHG | DIASTOLIC BLOOD PRESSURE: 78 MMHG | WEIGHT: 246.6 LBS | BODY MASS INDEX: 35.3 KG/M2 | TEMPERATURE: 96.8 F | OXYGEN SATURATION: 99 % | HEART RATE: 72 BPM | HEIGHT: 70 IN | RESPIRATION RATE: 16 BRPM

## 2025-01-24 DIAGNOSIS — M54.42 ACUTE LEFT-SIDED LOW BACK PAIN WITH LEFT-SIDED SCIATICA: Primary | ICD-10-CM

## 2025-01-24 DIAGNOSIS — I10 ESSENTIAL HYPERTENSION: ICD-10-CM

## 2025-01-24 PROCEDURE — 3078F DIAST BP <80 MM HG: CPT | Performed by: NURSE PRACTITIONER

## 2025-01-24 PROCEDURE — 1125F AMNT PAIN NOTED PAIN PRSNT: CPT | Performed by: NURSE PRACTITIONER

## 2025-01-24 PROCEDURE — 1160F RVW MEDS BY RX/DR IN RCRD: CPT | Performed by: NURSE PRACTITIONER

## 2025-01-24 PROCEDURE — 99214 OFFICE O/P EST MOD 30 MIN: CPT | Performed by: NURSE PRACTITIONER

## 2025-01-24 PROCEDURE — 3074F SYST BP LT 130 MM HG: CPT | Performed by: NURSE PRACTITIONER

## 2025-01-24 PROCEDURE — 1159F MED LIST DOCD IN RCRD: CPT | Performed by: NURSE PRACTITIONER

## 2025-01-24 RX ORDER — TRAMADOL HYDROCHLORIDE 50 MG/1
50-100 TABLET ORAL EVERY 8 HOURS PRN
Qty: 60 TABLET | Refills: 0 | Status: SHIPPED | OUTPATIENT
Start: 2025-01-24

## 2025-01-24 NOTE — PROGRESS NOTES
Subjective   Denny Hutchins is a 54 y.o. male.     Chief Complaint   Patient presents with    Hypertension    Acute left-sided low back pain with left-sided sciatica     PT is not helping but he is still attending sessions, using Tylenol PRN        PCP: Bere Dowling APRN    History of Present Illness     History of Present Illness  The patient is a 54-year-old male here for a follow-up on hypertension, hyperlipidemia, and anxiety. He also has low back pain and sciatica and is undergoing physical therapy, which he reports is not helping. He uses Tylenol as needed. His back pain was treated in the ER on 01/03/2025 and again in the clinic on 01/07/2025. An x-ray of the lumbar spine on 01/03/2025 showed degenerative changes without evidence of acute processes. He has been treated with Toradol, NSAIDs, Tylenol, steroids, and muscle relaxants.    He reports that his physical therapist has expressed reluctance to continue exercises due to concerns about potential harm. He has been receiving some form of steroid treatment for his back, the name of which he is uncertain, and has been using a TENS unit. Despite these interventions, he continues to experience significant pain. He has attempted to schedule an appointment with an orthopedist but was advised to consult his primary care physician first. He was supposed to see her 1.5 weeks ago. He has been taking muscle relaxants 3 times daily and has found some relief from Lortab, which he takes once in the morning and once at 1:00 PM. However, he experiences difficulty sleeping when lying on his right side, which exacerbates pain on his left side. He has been using a heating pad on his hip nightly but continues to experience pain radiating down his leg. He reports difficulty sleeping, often waking up at 2:00 AM and struggling to return to sleep. He also reports difficulty getting up and down, and limps slightly upon standing. He has found some relief from Percocet,  which was prescribed during a hospital visit. He has not used any other medications for his back pain. He does not smoke marijuana but possesses a medical marijuana card and is considering resuming use.      MEDICATIONS  Current: Tylenol, Toradol, steroids, muscle relaxants, Lortab, Percocet    Results  Imaging  X-ray of the lumbar spine on 1/3/2025 showed degenerative changes without evidence of acute processes.    Lab Results   Component Value Date    WBC 7.00 08/07/2024    HGB 14.3 08/07/2024    HCT 42.6 08/07/2024    MCV 96.4 08/07/2024     08/07/2024     Lab Results   Component Value Date    GLUCOSE 101 (H) 08/07/2024    BUN 12 08/07/2024    CREATININE 0.89 08/07/2024    EGFR 102.5 08/07/2024    BCR 13.5 08/07/2024    K 4.1 08/07/2024    CO2 22.0 08/07/2024    CALCIUM 9.5 08/07/2024    ALBUMIN 4.2 08/07/2024    BILITOT 0.4 08/07/2024    AST 18 08/07/2024    ALT 31 08/07/2024     Lab Results   Component Value Date    CHOL 160 08/07/2024    TRIG 258 (H) 08/07/2024    HDL 28 (L) 08/07/2024    LDL 89 08/07/2024     Lab Results   Component Value Date    TSH 1.340 08/21/2020     A1C Last 3 Results          8/7/2024    11:22   HGBA1C Last 3 Results   Hemoglobin A1C 5.70        The following portions of the patient's history were reviewed and updated as appropriate: allergies, current medications, past family history, past medical history, past social history, past surgical history and problem list.              Outpatient Medications Marked as Taking for the 1/24/25 encounter (Office Visit) with Bere Dowling APRN   Medication Sig Dispense Refill    aspirin 81 MG EC tablet Take 1 tablet by mouth Daily. 90 tablet 1    atorvastatin (LIPITOR) 80 MG tablet Take 1 tablet by mouth Daily. 30 tablet 2    clopidogrel (PLAVIX) 75 MG tablet Take 1 tablet by mouth Daily. 90 tablet 3    cyclobenzaprine (FLEXERIL) 10 MG tablet TAKE 1 TABLET BY MOUTH 3 TIMES A DAY AS NEEDED FOR MUSCLE SPASMS. 90 tablet 0    Diclofenac  "Sodium (VOLTAREN) 1 % gel gel Apply 4 g topically to the appropriate area as directed 4 (Four) Times a Day As Needed (knee pain). 350 g 6    DULoxetine (CYMBALTA) 60 MG capsule Take 1 capsule by mouth Daily. 30 capsule 6    fenofibrate (TRICOR) 48 MG tablet Take 1 tablet by mouth Daily. 90 tablet 3    losartan-hydrochlorothiazide (HYZAAR) 50-12.5 MG per tablet Take 1 tablet by mouth Daily. 30 tablet 6    metoprolol tartrate (LOPRESSOR) 50 MG tablet Take 1 tablet by mouth 2 (Two) Times a Day. 180 tablet 3    nicotine (NICODERM CQ) 14 MG/24HR patch PLACE 1 PATCH ON THE SKIN AS DIRECTED BY PROVIDER DAILY. START AFTER COMPLETING THE 21 MG PATCHES 28 patch 0    nicotine (Nicoderm CQ) 7 MG/24HR patch Place 1 patch on the skin as directed by provider Daily. Start after completing the 14 mg patches 28 each 0    omega-3 acid ethyl esters (LOVAZA) 1 g capsule Take 2 capsules by mouth Daily. 120 capsule 5    pantoprazole (PROTONIX) 40 MG EC tablet TAKE 1 TABLET BY MOUTH EVERY DAY 30 tablet 2     No Known Allergies        Objective     Vitals:    01/24/25 0952   BP: 122/78   BP Location: Left arm   Patient Position: Sitting   Cuff Size: Adult   Pulse: 72   Resp: 16   Temp: 96.8 °F (36 °C)   TempSrc: Infrared   SpO2: 99%   Weight: 112 kg (246 lb 9.6 oz)   Height: 177.8 cm (70\")     Body mass index is 35.38 kg/m².  Wt Readings from Last 3 Encounters:   01/24/25 112 kg (246 lb 9.6 oz)   01/03/25 112 kg (247 lb)   10/23/24 110 kg (242 lb 9.6 oz)       Physical Exam  Vitals and nursing note reviewed.   Constitutional:       Appearance: Normal appearance. He is well-developed.   HENT:      Head: Normocephalic and atraumatic.   Eyes:      Conjunctiva/sclera: Conjunctivae normal.   Cardiovascular:      Rate and Rhythm: Normal rate and regular rhythm.      Heart sounds: Normal heart sounds.   Pulmonary:      Effort: Pulmonary effort is normal. No respiratory distress.      Breath sounds: Normal breath sounds.   Abdominal:      General: " Bowel sounds are normal. There is no distension.      Palpations: Abdomen is soft.      Tenderness: There is no abdominal tenderness.   Musculoskeletal:      Cervical back: Normal range of motion.   Skin:     General: Skin is warm and dry.   Neurological:      Mental Status: He is alert and oriented to person, place, and time.   Psychiatric:         Speech: Speech normal.         Behavior: Behavior normal.         Thought Content: Thought content normal.         Judgment: Judgment normal.               Assessment & Plan   Diagnoses and all orders for this visit:    1. Acute left-sided low back pain with left-sided sciatica (Primary)  -     Ambulatory Referral to Orthopedic Surgery  -     traMADol (ULTRAM) 50 MG tablet; Take 1-2 tablets by mouth Every 8 (Eight) Hours As Needed for Moderate Pain.  Dispense: 60 tablet; Refill: 0  -     Compliance Drug Analysis, Ur - Urine, Clean Catch; Future    2. Essential hypertension        Assessment & Plan  1. Low back pain.  He reports that physical therapy has not been helpful and is experiencing significant pain that affects his sleep and daily activities. A short course of tramadol, a narcotic analgesic, will be prescribed, with a dosage of 1 to 2 tablets up to 3 times daily. A total of 60 tablets will be provided. He has been informed about the potential risks associated with this medication, including addiction/habit formation, sleepiness, and drowsiness, etc. He has been advised to use this medication sparingly. A referral to [facility] will be made for further evaluation and management of his back pain. A urine drugtest will be conducted today.  He reports that he has not had Percocet, Lortab, and morphine within the last 30 days.  He denies any illicit drug use  [place] reviewed via PDMP.  Controlled substance consent and prescribing agreement signed in clinic today urine drug screen ordered  2. Hypertension.  His blood pressure readings have shown significant  improvement since the last visit and are currently within the normal range.  Continue current medication            Return in about 3 months (around 4/24/2025) for chronic condition follow up.    I discussed my findings,recommendations, and plan of care was with the patient. They verbalized understanding and agreement.  Patient was encouraged to keep me informed of any acute changes, lack of improvement, or any new concerning symptoms.     Patient or patient representative verbalized consent for the use of Ambient Listening during the visit with  JW Roberson for chart documentation. 1/24/2025  10:32 EST

## 2025-02-01 LAB
DRUGS UR: NORMAL
Lab: NORMAL

## 2025-02-04 ENCOUNTER — TELEPHONE (OUTPATIENT)
Dept: INTERNAL MEDICINE | Facility: CLINIC | Age: 55
End: 2025-02-04
Payer: COMMERCIAL

## 2025-02-04 DIAGNOSIS — R89.2 ABNORMAL DRUG SCREEN: Primary | ICD-10-CM

## 2025-02-04 NOTE — TELEPHONE ENCOUNTER
----- Message from Bere Dowling sent at 2/3/2025 10:53 AM EST -----  Please call and double check  what narcotics he has had recently. He already told me he had percocet, morphine and hydrocodone for his back pain treatment.   ----------------------    However gabapentin and ambien showed up as unexpected in the UDS. Does he have an explanation ?

## 2025-02-04 NOTE — TELEPHONE ENCOUNTER
Patient has been notified and said he took a couple of his girlfriends nerve pills (gabapentin).  He does not know about the ambien.  He has taken tylenol PM before.

## 2025-02-07 PROBLEM — R89.2 ABNORMAL DRUG SCREEN: Status: ACTIVE | Noted: 2025-02-07

## 2025-02-10 NOTE — TELEPHONE ENCOUNTER
Name: Denny Hutchins      Relationship: Self      Best Callback Number: [phone]       HUB PROVIDED THE RELAY MESSAGE FROM THE OFFICE      PATIENT: HAS FURTHER QUESTIONS AND WOULD LIKE A CALL BACK AT THE FOLLOWING PHONE [phone]    ADDITIONAL INFORMATION: PATIENT IS IN [place] FOR WORK AND WILL BE THERE ALL WEEK.

## 2025-02-20 RX ORDER — PANTOPRAZOLE SODIUM 40 MG/1
40 TABLET, DELAYED RELEASE ORAL DAILY
Qty: 30 TABLET | Refills: 2 | Status: SHIPPED | OUTPATIENT
Start: 2025-02-20

## 2025-02-20 NOTE — TELEPHONE ENCOUNTER
Rx Refill Note  Requested Prescriptions     Pending Prescriptions Disp Refills    pantoprazole (PROTONIX) 40 MG EC tablet [Pharmacy Med Name: PANTOPRAZOLE SOD DR 40 MG TAB] 30 tablet 2     Sig: TAKE 1 TABLET BY MOUTH EVERY DAY      Last office visit with prescribing clinician: 1/24/2025     Next office visit with prescribing clinician: 4/25/2025       Adelaide Guzman  02/20/25, 09:29 EST

## 2025-03-05 ENCOUNTER — CLINICAL SUPPORT (OUTPATIENT)
Dept: INTERNAL MEDICINE | Facility: CLINIC | Age: 55
End: 2025-03-05
Payer: COMMERCIAL

## 2025-03-05 DIAGNOSIS — R89.2 ABNORMAL DRUG SCREEN: ICD-10-CM

## 2025-03-12 LAB
DRUGS UR: NORMAL
Lab: NORMAL

## 2025-04-04 DIAGNOSIS — F41.9 ANXIETY: ICD-10-CM

## 2025-04-04 RX ORDER — DULOXETIN HYDROCHLORIDE 60 MG/1
60 CAPSULE, DELAYED RELEASE ORAL DAILY
Qty: 30 CAPSULE | Refills: 0 | Status: SHIPPED | OUTPATIENT
Start: 2025-04-04

## 2025-04-25 ENCOUNTER — OFFICE VISIT (OUTPATIENT)
Dept: INTERNAL MEDICINE | Facility: CLINIC | Age: 55
End: 2025-04-25
Payer: COMMERCIAL

## 2025-04-25 VITALS
DIASTOLIC BLOOD PRESSURE: 88 MMHG | OXYGEN SATURATION: 96 % | SYSTOLIC BLOOD PRESSURE: 132 MMHG | HEIGHT: 70 IN | WEIGHT: 251.2 LBS | BODY MASS INDEX: 35.96 KG/M2 | TEMPERATURE: 98 F | HEART RATE: 62 BPM | RESPIRATION RATE: 10 BRPM

## 2025-04-25 DIAGNOSIS — I10 ESSENTIAL HYPERTENSION: Primary | ICD-10-CM

## 2025-04-25 DIAGNOSIS — R89.2 ABNORMAL DRUG SCREEN: ICD-10-CM

## 2025-04-25 DIAGNOSIS — E78.2 MIXED HYPERLIPIDEMIA: ICD-10-CM

## 2025-04-25 DIAGNOSIS — F41.9 ANXIETY: ICD-10-CM

## 2025-04-25 DIAGNOSIS — Z12.11 COLON CANCER SCREENING: ICD-10-CM

## 2025-04-25 DIAGNOSIS — R12 HEARTBURN: ICD-10-CM

## 2025-04-25 DIAGNOSIS — Z72.0 TOBACCO USER: ICD-10-CM

## 2025-04-25 RX ORDER — NICOTINE 21 MG/24HR
1 PATCH, TRANSDERMAL 24 HOURS TRANSDERMAL EVERY 24 HOURS
Qty: 28 PATCH | Refills: 0 | Status: SHIPPED | OUTPATIENT
Start: 2025-04-25

## 2025-04-25 RX ORDER — NICOTINE 21 MG/24HR
1 PATCH, TRANSDERMAL 24 HOURS TRANSDERMAL EVERY 24 HOURS
Qty: 42 PATCH | Refills: 0 | Status: SHIPPED | OUTPATIENT
Start: 2025-04-25 | End: 2025-06-06

## 2025-04-25 RX ORDER — DULOXETIN HYDROCHLORIDE 30 MG/1
30 CAPSULE, DELAYED RELEASE ORAL DAILY
Qty: 30 CAPSULE | Refills: 3 | Status: SHIPPED | OUTPATIENT
Start: 2025-04-25

## 2025-04-25 RX ORDER — PANTOPRAZOLE SODIUM 40 MG/1
40 TABLET, DELAYED RELEASE ORAL DAILY
Qty: 30 TABLET | Refills: 6 | Status: SHIPPED | OUTPATIENT
Start: 2025-04-25

## 2025-04-25 NOTE — PROGRESS NOTES
Subjective   Denny Hutchins is a 54 y.o. male.     Chief Complaint   Patient presents with    Hypertension     3 month recheck     Nicotine Dependence     Had success with the nicotine patches previously, ran out last week and was on the lowest dose. He would like a refill on the highest dose as it provided the most support        PCP: Bere Dowling APRN    Hypertension    Nicotine Dependence         History of Present Illness  The patient is a 54-year-old male who is here to follow up on hypertension and nicotine dependence.    Blood pressure in the clinic is 132/88. He does not monitor his blood pressure at home and reports no symptoms such as headaches, dizziness, chest pain, palpitations, shortness of breath, coughing, wheezing, or swelling in the feet and ankles. Current medications for blood pressure control include losartan/hydrochlorothiazide and metoprolol.    Nicotine patches were previously successful for smoking cessation, but he ran out last week with the 7 mg patch and requests a refill. Despite the patches, he continues to smoke approximately one pack of cigarettes daily, which is a reduction from his previous two-pack-a-day habit. Nicotine gum was tried without benefit, and he has abstained from alcohol consumption.    Duloxetine is taken for anxiety, Excessive sweating during physical exertion is reported, which he attributes to his medication. He expresses a desire to lose weight and acknowledges that his diet is not particularly healthy, although junk food and fast food are not consumed.    Occasional heartburn occurs when consuming spicy foods. There is no history of colonoscopy or Cologuard test. An EGD was performed by Dr. Wiseman last year.    Lower back pain was experienced, and medication from his girlfriend's knee replacement stash was taken, which helped alleviate the pain.    SOCIAL HISTORY  The patient admits to smoking about a pack of cigarettes a day. He has quit drinking  alcohol.    Results      Lab Results   Component Value Date    WBC 7.00 08/07/2024    HGB 14.3 08/07/2024    HCT 42.6 08/07/2024    MCV 96.4 08/07/2024     08/07/2024     Lab Results   Component Value Date    GLUCOSE 101 (H) 08/07/2024    BUN 12 08/07/2024    CREATININE 0.89 08/07/2024    EGFR 102.5 08/07/2024    BCR 13.5 08/07/2024    K 4.1 08/07/2024    CO2 22.0 08/07/2024    CALCIUM 9.5 08/07/2024    ALBUMIN 4.2 08/07/2024    BILITOT 0.4 08/07/2024    AST 18 08/07/2024    ALT 31 08/07/2024     Lab Results   Component Value Date    CHOL 160 08/07/2024    TRIG 258 (H) 08/07/2024    HDL 28 (L) 08/07/2024    LDL 89 08/07/2024     Lab Results   Component Value Date    TSH 1.340 08/21/2020     A1C Last 3 Results          8/7/2024    11:22   HGBA1C Last 3 Results   Hemoglobin A1C 5.70        The following portions of the patient's history were reviewed and updated as appropriate: allergies, current medications, past family history, past medical history, past social history, past surgical history and problem list.            Outpatient Medications Marked as Taking for the 4/25/25 encounter (Office Visit) with Bere Dowling APRN   Medication Sig Dispense Refill    aspirin 81 MG EC tablet Take 1 tablet by mouth Daily. 90 tablet 1    atorvastatin (LIPITOR) 80 MG tablet Take 1 tablet by mouth Daily. 30 tablet 2    clopidogrel (PLAVIX) 75 MG tablet Take 1 tablet by mouth Daily. 90 tablet 3    DULoxetine (CYMBALTA) 30 MG capsule Take 1 capsule by mouth Daily. 30 capsule 3    fenofibrate (TRICOR) 48 MG tablet Take 1 tablet by mouth Daily. 90 tablet 3    losartan-hydrochlorothiazide (HYZAAR) 50-12.5 MG per tablet Take 1 tablet by mouth Daily. 30 tablet 6    metoprolol tartrate (LOPRESSOR) 50 MG tablet Take 1 tablet by mouth 2 (Two) Times a Day. 180 tablet 3    nicotine (NICODERM CQ) 14 MG/24HR patch Place 1 patch on the skin as directed by provider Daily. Start after completing the 21 mg patches 28 patch 0    nicotine  "(Nicoderm CQ) 7 MG/24HR patch Place 1 patch on the skin as directed by provider Daily. Start after completing the 14 mg patches 28 each 0    omega-3 acid ethyl esters (LOVAZA) 1 g capsule Take 2 capsules by mouth Daily. 120 capsule 5    pantoprazole (PROTONIX) 40 MG EC tablet Take 1 tablet by mouth Daily. 30 tablet 6    [DISCONTINUED] DULoxetine (CYMBALTA) 60 MG capsule TAKE 1 CAPSULE BY MOUTH EVERY DAY 30 capsule 0    [DISCONTINUED] pantoprazole (PROTONIX) 40 MG EC tablet TAKE 1 TABLET BY MOUTH EVERY DAY 30 tablet 2     No Known Allergies        Objective     Vitals:    04/25/25 0758   BP: 132/88   Pulse: 62   Resp: 10   Temp: 98 °F (36.7 °C)   TempSrc: Infrared   SpO2: 96%   Weight: 114 kg (251 lb 3.2 oz)   Height: 177.8 cm (70\")     Body mass index is 36.04 kg/m².  Wt Readings from Last 3 Encounters:   04/25/25 114 kg (251 lb 3.2 oz)   01/24/25 112 kg (246 lb 9.6 oz)   01/03/25 112 kg (247 lb)       Physical Exam  Vitals and nursing note reviewed.   Constitutional:       General: He is not in acute distress.     Appearance: Normal appearance. He is well-developed. He is obese. He is not ill-appearing or diaphoretic.   HENT:      Head: Normocephalic and atraumatic.   Eyes:      General: No scleral icterus.     Conjunctiva/sclera: Conjunctivae normal.   Neck:      Vascular: No JVD.   Cardiovascular:      Rate and Rhythm: Normal rate and regular rhythm.      Chest Wall: PMI is not displaced. No thrill.      Heart sounds: Normal heart sounds. No murmur heard.  Pulmonary:      Effort: Pulmonary effort is normal. No accessory muscle usage or respiratory distress.      Breath sounds: Normal breath sounds.   Chest:      Chest wall: No tenderness.   Abdominal:      General: Bowel sounds are normal. There is no distension.      Palpations: Abdomen is soft.      Tenderness: There is no abdominal tenderness.   Musculoskeletal:      Cervical back: Normal range of motion.      Right lower leg: No edema.      Left lower leg: No " edema.   Skin:     General: Skin is warm and dry.      Coloration: Skin is not ashen, jaundiced, mottled or pale.      Findings: No erythema.   Neurological:      Mental Status: He is alert and oriented to person, place, and time.   Psychiatric:         Attention and Perception: Attention normal.         Mood and Affect: Mood normal.         Speech: Speech normal.         Behavior: Behavior normal. Behavior is cooperative.         Thought Content: Thought content normal.         Judgment: Judgment normal.                   Assessment & Plan   Diagnoses and all orders for this visit:    1. Essential hypertension (Primary)    2. Mixed hyperlipidemia    3. Anxiety  -     DULoxetine (CYMBALTA) 30 MG capsule; Take 1 capsule by mouth Daily.  Dispense: 30 capsule; Refill: 3    4. Tobacco user  -     nicotine (Nicoderm CQ) 21 MG/24HR patch; Place 1 patch on the skin as directed by provider Daily for 42 days.  Dispense: 42 patch; Refill: 0  -     nicotine (NICODERM CQ) 14 MG/24HR patch; Place 1 patch on the skin as directed by provider Daily. Start after completing the 21 mg patches  Dispense: 28 patch; Refill: 0  -     nicotine (Nicoderm CQ) 7 MG/24HR patch; Place 1 patch on the skin as directed by provider Daily. Start after completing the 14 mg patches  Dispense: 28 each; Refill: 0    5. Colon cancer screening  -     Ambulatory Referral For Screening Colonoscopy    6. Abnormal drug screen    7. Heartburn  -     pantoprazole (PROTONIX) 40 MG EC tablet; Take 1 tablet by mouth Daily.  Dispense: 30 tablet; Refill: 6        Assessment & Plan  1. Hypertension.  - Blood pressure readings have been within acceptable range, with today's reading at 132/88 mmHg.  - Current regimen includes losartan/hydrochlorothiazide and metoprolol.  - No reported symptoms such as headaches, dizziness, chest pain, heart palpitations, or shortness of breath.  - Plan to maintain current medications and schedule a comprehensive lab workup during the  next visit.    2. Nicotine dependence.  - Previously using nicotine patches with some success; ran out of the 7 mg patch last week.  - Smoking approximately one pack of cigarettes per day; reported smoking less when using higher dose patches.  - Advised against smoking while using the patch.  - Prescribed nicotine patches: 21 mg for 42 days, 14 mg for one month, and 7 mg for one month. Encouraged to communicate if an extended period on a particular dose is needed.  Denny Hutchins  reports that he has been smoking cigarettes. He started smoking about 29 years ago. He has a 14.7 pack-year smoking history. He has been exposed to tobacco smoke. He has never used smokeless tobacco. I have educated him on the risk of diseases from using tobacco products such as cancer, COPD, and heart disease.     I advised him to quit and he is willing to quit. We have discussed the following method/s for tobacco cessation:  OTC Cessation Products.  Together we have set a quit date for 1 month from today.  He will follow up with me in several months or sooner to check on his progress.    I spent 4 minutes counseling the patient.       3. Anxiety.  - Currently on duloxetine 60 mg for anxiety.  - Reports excessive sweating, potentially due to duloxetine.  - Plan to reduce duloxetine dosage from 60 mg to 30 mg to assess impact on sweating and mood.    4. Heartburn.  - Experiences occasional heartburn, particularly with spicy foods.  - Prescription for heartburn medication will be renewed.    5. Health maintenance.  - Declined pneumonia and shingles vaccines.  - Colonoscopy ordered with Dr. Wiseman, with a note indicating preference for an alternative prep method.    Follow-up  - Follow-up appointment scheduled in 3 months.        Return in about 3 months (around 7/25/2025) for chronic condition follow up.    I discussed my findings,recommendations, and plan of care was with the patient. They verbalized understanding and  agreement.  Patient was encouraged to keep me informed of any acute changes, lack of improvement, or any new concerning symptoms.     Patient or patient representative verbalized consent for the use of Ambient Listening during the visit with  JW Roberson for chart documentation. 4/25/2025  11:50 EDT

## 2025-05-06 DIAGNOSIS — F41.9 ANXIETY: ICD-10-CM

## 2025-05-06 RX ORDER — DULOXETIN HYDROCHLORIDE 60 MG/1
60 CAPSULE, DELAYED RELEASE ORAL DAILY
Qty: 30 CAPSULE | Refills: 0 | OUTPATIENT
Start: 2025-05-06

## 2025-05-16 ENCOUNTER — LAB REQUISITION (OUTPATIENT)
Dept: LAB | Facility: HOSPITAL | Age: 55
End: 2025-05-16
Payer: COMMERCIAL

## 2025-05-16 ENCOUNTER — OUTSIDE FACILITY SERVICE (OUTPATIENT)
Dept: GASTROENTEROLOGY | Facility: CLINIC | Age: 55
End: 2025-05-16
Payer: COMMERCIAL

## 2025-05-16 DIAGNOSIS — D12.5 BENIGN NEOPLASM OF SIGMOID COLON: ICD-10-CM

## 2025-05-16 DIAGNOSIS — Z12.11 ENCOUNTER FOR SCREENING FOR MALIGNANT NEOPLASM OF COLON: ICD-10-CM

## 2025-05-16 DIAGNOSIS — Z80.0 FAMILY HISTORY OF MALIGNANT NEOPLASM OF DIGESTIVE ORGANS: ICD-10-CM

## 2025-05-16 DIAGNOSIS — D12.0 BENIGN NEOPLASM OF CECUM: ICD-10-CM

## 2025-05-16 DIAGNOSIS — D12.3 BENIGN NEOPLASM OF TRANSVERSE COLON: ICD-10-CM

## 2025-05-16 PROCEDURE — 45385 COLONOSCOPY W/LESION REMOVAL: CPT | Performed by: INTERNAL MEDICINE

## 2025-05-16 PROCEDURE — 45380 COLONOSCOPY AND BIOPSY: CPT | Performed by: INTERNAL MEDICINE

## 2025-05-16 PROCEDURE — 88305 TISSUE EXAM BY PATHOLOGIST: CPT | Performed by: INTERNAL MEDICINE

## 2025-05-19 LAB — REF LAB TEST METHOD: NORMAL

## 2025-05-20 DIAGNOSIS — Z72.0 TOBACCO USER: ICD-10-CM

## 2025-05-21 RX ORDER — NICOTINE 21 MG/24HR
1 PATCH, TRANSDERMAL 24 HOURS TRANSDERMAL EVERY 24 HOURS
Qty: 28 PATCH | Refills: 0 | OUTPATIENT
Start: 2025-05-21

## 2025-07-07 ENCOUNTER — OFFICE VISIT (OUTPATIENT)
Age: 55
End: 2025-07-07
Payer: COMMERCIAL

## 2025-07-07 VITALS
RESPIRATION RATE: 18 BRPM | DIASTOLIC BLOOD PRESSURE: 90 MMHG | WEIGHT: 244.9 LBS | HEART RATE: 98 BPM | SYSTOLIC BLOOD PRESSURE: 150 MMHG | BODY MASS INDEX: 35.06 KG/M2 | TEMPERATURE: 96.9 F | HEIGHT: 70 IN

## 2025-07-07 DIAGNOSIS — M19.012 PRIMARY OSTEOARTHRITIS OF LEFT SHOULDER: ICD-10-CM

## 2025-07-07 DIAGNOSIS — G56.03 BILATERAL CARPAL TUNNEL SYNDROME: ICD-10-CM

## 2025-07-07 DIAGNOSIS — M25.50 MULTIPLE JOINT PAIN: Primary | ICD-10-CM

## 2025-07-07 DIAGNOSIS — M25.562 CHRONIC PAIN OF LEFT KNEE: ICD-10-CM

## 2025-07-07 DIAGNOSIS — M54.32 SCIATICA OF LEFT SIDE: ICD-10-CM

## 2025-07-07 DIAGNOSIS — G89.29 CHRONIC PAIN OF LEFT KNEE: ICD-10-CM

## 2025-07-07 NOTE — PROGRESS NOTES
Office Visit       Date: 07/07/2025   Patient Name: Denny Hutchins  MRN: [id number]  YOB: 1970    Referring Physician: Talia Tomlin PA-C     Chief Complaint: Joint pain      History of Present Illness: Denny Hutchins is a 54 y.o. male who is here today in consultation for joint pain.  The patient presents today with pain in his left shoulder, left knee, and down his left leg.  He also has intermittent numbness and tingling in his hands.    His left shoulder pain began about 18 months ago.  He had an injury of the shoulder while carrying a large box of wood.  The pain is in the superior lateral part of the shoulder.  It worsens with abduction and external rotation.  He describes it as aching and occasionally sharp.  It does not radiate down his arm.    He saw [facility] orthopedics and MRI was performed.  I do not have the results but according to the note it was fairly unremarkable.  He was given an injection that worked for only about 3 to 4 weeks.  He did physical therapy without significant relief.  He has had several more injections without significant relief.  A more recent x-ray showed AC degenerative changes with cystic changes at the glenohumeral joint.    He also complains of pain down his left posterior leg.  It goes from his buttock to his calf.  He describes it as an ache.  It seems to be worse at night.  He was diagnosed as having sciatica.  X-rays of the lumbar spine showed degenerative changes.  Physical therapy did give some relief.    He also complains of left medial knee pain.  This is intermittent.  He will occasionally get a very sharp pain in the medial knee.  This tends to occur with weightbearing.  He rarely has rest pain.  He has had no swelling.  He has no history of gout like flares.    He also has bilateral hand numbness at night.  This rarely occurs during the day.  It will often awaken him and he will have to shake  his hands and hang them off the bed.    He has no history of joint swelling.  He has no small joint complaints.  He has morning stiffness lasting about an hour.  Occasionally the sciatic pain will occur at night but otherwise he has no night pain.  He has no history of skin rashes, psoriasis, iritis, or inflammatory bowel disease.  He has had no small joint pain or swelling.  He has no family history of autoimmune disease.    He takes Tylenol for pain.  He cannot take nonsteroidal anti-inflammatory drugs because of recent myocardial infarction with stent placement.      Subjective   Review of Systems: Review of Systems     Past Medical History:   Past Medical History:   Diagnosis Date    Acute right-sided low back pain without sciatica 8/17/2020    Anxiety     Cellulitis of right hand 11/8/2022    Hyperlipidemia     Hypertension        Past Surgical History:   Past Surgical History:   Procedure Laterality Date    NO PAST SURGERIES         Family History:   Family History   Problem Relation Age of Onset    Diabetes Mother     Diabetes type II Father     No Known Problems Brother        Social History:   Social History     Socioeconomic History    Marital status: Single   Tobacco Use    Smoking status: Every Day     Current packs/day: 0.50     Average packs/day: 0.5 packs/day for 29.5 years (14.8 ttl pk-yrs)     Types: Cigarettes     Start date: 1996     Passive exposure: Current    Smokeless tobacco: Never   Vaping Use    Vaping status: Never Used   Substance and Sexual Activity    Alcohol use: Yes     Alcohol/week: 12.0 standard drinks of alcohol     Types: 12 Cans of beer per week    Drug use: Never    Sexual activity: Yes       Medications:   Current Outpatient Medications:     Acetaminophen (TYLENOL 8 HOUR PO), Take  by mouth., Disp: , Rfl:     aspirin 81 MG EC tablet, Take 1 tablet by mouth Daily., Disp: 90 tablet, Rfl: 1    atorvastatin (LIPITOR) 80 MG tablet, Take 1 tablet by mouth Daily., Disp: 30 tablet,  "Rfl: 2    clopidogrel (PLAVIX) 75 MG tablet, Take 1 tablet by mouth Daily., Disp: 90 tablet, Rfl: 3    DULoxetine (CYMBALTA) 30 MG capsule, Take 1 capsule by mouth Daily., Disp: 30 capsule, Rfl: 3    fenofibrate (TRICOR) 48 MG tablet, Take 1 tablet by mouth Daily., Disp: 90 tablet, Rfl: 3    losartan-hydrochlorothiazide (HYZAAR) 50-12.5 MG per tablet, Take 1 tablet by mouth Daily., Disp: 30 tablet, Rfl: 6    metoprolol tartrate (LOPRESSOR) 50 MG tablet, Take 1 tablet by mouth 2 (Two) Times a Day., Disp: 180 tablet, Rfl: 3    nicotine (NICODERM CQ) 14 MG/24HR patch, Place 1 patch on the skin as directed by provider Daily. Start after completing the 21 mg patches, Disp: 28 patch, Rfl: 0    nicotine (Nicoderm CQ) 7 MG/24HR patch, Place 1 patch on the skin as directed by provider Daily. Start after completing the 14 mg patches, Disp: 28 each, Rfl: 0    omega-3 acid ethyl esters (LOVAZA) 1 g capsule, Take 2 capsules by mouth Daily., Disp: 120 capsule, Rfl: 5    pantoprazole (PROTONIX) 40 MG EC tablet, Take 1 tablet by mouth Daily., Disp: 30 tablet, Rfl: 6    Allergies: No Known Allergies    I have reviewed and updated the patient's chief complaint, history of present illness, review of systems, past medical history, surgical history, family history, social history, medications and allergy list as appropriate.     Objective    Vital Signs:   Vitals:    07/07/25 1047   BP: 150/90   BP Location: Right arm   Patient Position: Sitting   Cuff Size: Adult   Pulse: 98   Resp: 18   Temp: 96.9 °F (36.1 °C)   TempSrc: Infrared   Weight: 111 kg (244 lb 14.4 oz)   Height: 177.8 cm (70\")   PainSc: 0-No pain     Body mass index is 35.14 kg/m².     Physical Exam:  General: The patient is well-developed and well nourished. Cooperative, alert and oriented x3. Affect is normal. Hydration appears normal.   HEENT: Normocephalic and atraumatic.  Male pattern hair loss. Lids and conjunctiva are normal. Pupils are equal and sclera are clear. " Oropharynx is clear   NECK: Supple without adenopathy, masses or thyromegaly.   CARDIOVASCULAR: Regular rate and rhythm. No murmurs, rubs or gallops   LUNGS: Effort is normal. Lungs are clear bilaterally.  There are decreased breath sounds.  ABDOMEN: Soft and non-tender without palpable masses or hepatosplenomegaly.  Palpation is fairly unrevealing due to obesity..   EXTREMITIES: Trace to 1+ pedal edema.  Fingers have borderline clubbing.  SKIN: Inspection and palpation are normal.  His nails are short and he admits to chewing his nail.  I see no nail pits.  He does have calluses on both knees from his work as a .  I see no psoriatic lesions.  I see no nodules.  NEUROLOGIC: Gait is normal.  Deep tendon reflexes are 1-2+ and symmetric.  Straight leg raising is negative.  MUSCULOSKELETAL: Complete joint exam was performed. There was full range of motion of the shoulders, elbows, wrists and hands without soft tissue swelling synovitis or deformities except as noted.  Right shoulder has full range of motion without pain.  Left shoulder has pain at 85 to 90 degrees abduction with limited external rotation.  Internal rotation is good.  There is no AC joint tenderness.  I feel no effusions.  Hips have good flexion and slightly limited  internal and external rotation.  Knees have no palpable effusions.  There is full extension and flexion.  There is medial joint line tenderness on the left.  Ankles and feet have no soft tissue swelling or synovitis.  BACK:  Straight without scoliosis    Assessment / Plan        Assessment & Plan  Multiple joint pain  Left shoulder pain, sciatica, left medial knee pain, and bilateral hand numbness.  Left shoulder pain occurred following injury.  No history of joint swelling.  No labs available.  X-ray of the shoulder showed osteoarthritis of the AC joint and glenohumeral joint.  X-ray of the lumbar spine showed degenerative disc disease.    Historically and by exam, this is most  consistent with osteoarthritis.  For completeness, I will order a few labs today and x-ray of the left knee.  We discussed inflammatory arthritis and osteoarthritis at length.  I will have him wear night splints for presumed carpal tunnel.  He is not a candidate for NSAIDs so I have little more to offer treatment wise.  I will see him in follow-up in about 6 to 8 weeks to review the x-rays and labs and see how the night splints did.  Orders:    Comprehensive Metabolic Panel    CBC Auto Differential    C-reactive Protein    Rheumatoid Factor    Cyclic Citrul Peptide Antibody, IgG / IgA    HLA-B27 Antigen    Primary osteoarthritis of left shoulder  Apparently, x-ray was consistent with osteoarthritis.  I do not think the pain is associated with inflammatory arthritis.  I will obtain several labs today.  I suggested he would want to follow-up with his orthopedist.       Sciatica of left side  Physical therapy gave some relief.  X-ray of the back showed degenerative disc disease.  Symptoms are fairly low-grade currently.  He does not wish further intervention.       Chronic pain of left knee  Pain primarily in the medial knee.  This is probably degenerative.  Orders:    XR Knee 1 or 2 View Left    Bilateral carpal tunnel syndrome  Presumed diagnosis given the night numbness.  We will try night splints.  If no better by follow-up, we will get nerve conduction studies.            Follow Up:   Return in about 6 weeks (around 8/18/2025).        Andrei Shahid MD  [facility]

## 2025-07-07 NOTE — ASSESSMENT & PLAN NOTE
Presumed diagnosis given the night numbness.  We will try night splints.  If no better by follow-up, we will get nerve conduction studies.

## 2025-07-07 NOTE — ASSESSMENT & PLAN NOTE
Left shoulder pain, sciatica, left medial knee pain, and bilateral hand numbness.  Left shoulder pain occurred following injury.  No history of joint swelling.  No labs available.  X-ray of the shoulder showed osteoarthritis of the AC joint and glenohumeral joint.  X-ray of the lumbar spine showed degenerative disc disease.    Historically and by exam, this is most consistent with osteoarthritis.  For completeness, I will order a few labs today and x-ray of the left knee.  We discussed inflammatory arthritis and osteoarthritis at length.  I will have him wear night splints for presumed carpal tunnel.  He is not a candidate for NSAIDs so I have little more to offer treatment wise.  I will see him in follow-up in about 6 to 8 weeks to review the x-rays and labs and see how the night splints did.  Orders:    Comprehensive Metabolic Panel    CBC Auto Differential    C-reactive Protein    Rheumatoid Factor    Cyclic Citrul Peptide Antibody, IgG / IgA    HLA-B27 Antigen

## 2025-07-07 NOTE — ASSESSMENT & PLAN NOTE
Apparently, x-ray was consistent with osteoarthritis.  I do not think the pain is associated with inflammatory arthritis.  I will obtain several labs today.  I suggested he would want to follow-up with his orthopedist.

## 2025-07-07 NOTE — ASSESSMENT & PLAN NOTE
Physical therapy gave some relief.  X-ray of the back showed degenerative disc disease.  Symptoms are fairly low-grade currently.  He does not wish further intervention.

## 2025-07-07 NOTE — ASSESSMENT & PLAN NOTE
Pain primarily in the medial knee.  This is probably degenerative.  Orders:    XR Knee 1 or 2 View Left

## 2025-07-08 ENCOUNTER — RESULTS FOLLOW-UP (OUTPATIENT)
Age: 55
End: 2025-07-08
Payer: COMMERCIAL

## 2025-07-15 LAB
ALBUMIN SERPL-MCNC: 4.6 G/DL (ref 3.8–4.9)
ALP SERPL-CCNC: 75 IU/L (ref 44–121)
ALT SERPL-CCNC: 25 IU/L (ref 0–44)
AST SERPL-CCNC: 16 IU/L (ref 0–40)
BASOPHILS # BLD AUTO: 0.1 X10E3/UL (ref 0–0.2)
BASOPHILS NFR BLD AUTO: 1 %
BILIRUB SERPL-MCNC: 0.4 MG/DL (ref 0–1.2)
BUN SERPL-MCNC: 17 MG/DL (ref 6–24)
BUN/CREAT SERPL: 17 (ref 9–20)
CALCIUM SERPL-MCNC: 9.6 MG/DL (ref 8.7–10.2)
CCP IGA+IGG SERPL IA-ACNC: 5 UNITS (ref 0–19)
CHLORIDE SERPL-SCNC: 101 MMOL/L (ref 96–106)
CO2 SERPL-SCNC: 20 MMOL/L (ref 20–29)
CREAT SERPL-MCNC: 1.02 MG/DL (ref 0.76–1.27)
CRP SERPL-MCNC: 2 MG/L (ref 0–10)
EGFRCR SERPLBLD CKD-EPI 2021: 88 ML/MIN/1.73
EOSINOPHIL # BLD AUTO: 0.2 X10E3/UL (ref 0–0.4)
EOSINOPHIL NFR BLD AUTO: 2 %
ERYTHROCYTE [DISTWIDTH] IN BLOOD BY AUTOMATED COUNT: 13.4 % (ref 11.6–15.4)
GLOBULIN SER CALC-MCNC: 2.7 G/DL (ref 1.5–4.5)
GLUCOSE SERPL-MCNC: 103 MG/DL (ref 70–99)
HCT VFR BLD AUTO: 46.2 % (ref 37.5–51)
HGB BLD-MCNC: 15.2 G/DL (ref 13–17.7)
HLA-B27 QL NAA+PROBE: NEGATIVE
IMM GRANULOCYTES # BLD AUTO: 0.1 X10E3/UL (ref 0–0.1)
IMM GRANULOCYTES NFR BLD AUTO: 1 %
LYMPHOCYTES # BLD AUTO: 2.5 X10E3/UL (ref 0.7–3.1)
LYMPHOCYTES NFR BLD AUTO: 28 %
MCH RBC QN AUTO: 32.3 PG (ref 26.6–33)
MCHC RBC AUTO-ENTMCNC: 32.9 G/DL (ref 31.5–35.7)
MCV RBC AUTO: 98 FL (ref 79–97)
MONOCYTES # BLD AUTO: 0.7 X10E3/UL (ref 0.1–0.9)
MONOCYTES NFR BLD AUTO: 8 %
NEUTROPHILS # BLD AUTO: 5.6 X10E3/UL (ref 1.4–7)
NEUTROPHILS NFR BLD AUTO: 60 %
PLATELET # BLD AUTO: 231 X10E3/UL (ref 150–450)
POTASSIUM SERPL-SCNC: 4.3 MMOL/L (ref 3.5–5.2)
PROT SERPL-MCNC: 7.3 G/DL (ref 6–8.5)
RBC # BLD AUTO: 4.7 X10E6/UL (ref 4.14–5.8)
RHEUMATOID FACT SERPL-ACNC: <10 IU/ML
SODIUM SERPL-SCNC: 138 MMOL/L (ref 134–144)
WBC # BLD AUTO: 9.1 X10E3/UL (ref 3.4–10.8)

## 2025-08-08 ENCOUNTER — OFFICE VISIT (OUTPATIENT)
Dept: CARDIOLOGY | Facility: CLINIC | Age: 55
End: 2025-08-08
Payer: COMMERCIAL

## 2025-08-08 VITALS
HEART RATE: 96 BPM | HEIGHT: 70 IN | OXYGEN SATURATION: 97 % | WEIGHT: 251 LBS | BODY MASS INDEX: 35.93 KG/M2 | DIASTOLIC BLOOD PRESSURE: 76 MMHG | SYSTOLIC BLOOD PRESSURE: 126 MMHG

## 2025-08-08 DIAGNOSIS — R42 DIZZINESS: ICD-10-CM

## 2025-08-08 DIAGNOSIS — I10 ESSENTIAL HYPERTENSION: ICD-10-CM

## 2025-08-08 DIAGNOSIS — Z72.0 TOBACCO USER: ICD-10-CM

## 2025-08-08 DIAGNOSIS — E78.5 DYSLIPIDEMIA: ICD-10-CM

## 2025-08-08 DIAGNOSIS — I48.0 PAROXYSMAL ATRIAL FIBRILLATION: ICD-10-CM

## 2025-08-08 DIAGNOSIS — I25.10 CORONARY ARTERY DISEASE INVOLVING NATIVE CORONARY ARTERY OF NATIVE HEART WITHOUT ANGINA PECTORIS: Primary | ICD-10-CM

## 2025-08-08 PROCEDURE — 1160F RVW MEDS BY RX/DR IN RCRD: CPT | Performed by: INTERNAL MEDICINE

## 2025-08-08 PROCEDURE — 3074F SYST BP LT 130 MM HG: CPT | Performed by: INTERNAL MEDICINE

## 2025-08-08 PROCEDURE — 99214 OFFICE O/P EST MOD 30 MIN: CPT | Performed by: INTERNAL MEDICINE

## 2025-08-08 PROCEDURE — 3078F DIAST BP <80 MM HG: CPT | Performed by: INTERNAL MEDICINE

## 2025-08-08 PROCEDURE — 93000 ELECTROCARDIOGRAM COMPLETE: CPT | Performed by: INTERNAL MEDICINE

## 2025-08-08 PROCEDURE — 1159F MED LIST DOCD IN RCRD: CPT | Performed by: INTERNAL MEDICINE

## 2025-08-15 ENCOUNTER — OFFICE VISIT (OUTPATIENT)
Age: 55
End: 2025-08-15
Payer: COMMERCIAL

## 2025-08-15 VITALS
BODY MASS INDEX: 35.07 KG/M2 | HEIGHT: 70 IN | HEART RATE: 69 BPM | WEIGHT: 245 LBS | DIASTOLIC BLOOD PRESSURE: 82 MMHG | TEMPERATURE: 98.2 F | SYSTOLIC BLOOD PRESSURE: 140 MMHG

## 2025-08-15 DIAGNOSIS — M54.32 SCIATICA OF LEFT SIDE: ICD-10-CM

## 2025-08-15 DIAGNOSIS — M19.012 PRIMARY OSTEOARTHRITIS OF LEFT SHOULDER: ICD-10-CM

## 2025-08-15 DIAGNOSIS — M25.562 CHRONIC PAIN OF LEFT KNEE: ICD-10-CM

## 2025-08-15 DIAGNOSIS — G89.29 CHRONIC PAIN OF LEFT KNEE: ICD-10-CM

## 2025-08-15 DIAGNOSIS — G56.03 BILATERAL CARPAL TUNNEL SYNDROME: ICD-10-CM

## 2025-08-15 DIAGNOSIS — M25.50 MULTIPLE JOINT PAIN: Primary | ICD-10-CM

## 2025-08-25 ENCOUNTER — OFFICE VISIT (OUTPATIENT)
Dept: INTERNAL MEDICINE | Facility: CLINIC | Age: 55
End: 2025-08-25
Payer: COMMERCIAL

## 2025-08-25 ENCOUNTER — LAB (OUTPATIENT)
Dept: INTERNAL MEDICINE | Facility: CLINIC | Age: 55
End: 2025-08-25
Payer: COMMERCIAL

## 2025-08-25 VITALS
SYSTOLIC BLOOD PRESSURE: 126 MMHG | TEMPERATURE: 97.8 F | HEART RATE: 76 BPM | HEIGHT: 70 IN | OXYGEN SATURATION: 97 % | DIASTOLIC BLOOD PRESSURE: 82 MMHG | BODY MASS INDEX: 35.45 KG/M2 | WEIGHT: 247.6 LBS

## 2025-08-25 DIAGNOSIS — E78.5 DYSLIPIDEMIA: ICD-10-CM

## 2025-08-25 DIAGNOSIS — I10 PRIMARY HYPERTENSION: ICD-10-CM

## 2025-08-25 DIAGNOSIS — R12 HEARTBURN: ICD-10-CM

## 2025-08-25 DIAGNOSIS — F41.9 ANXIETY: ICD-10-CM

## 2025-08-25 DIAGNOSIS — Z12.5 SCREENING FOR PROSTATE CANCER: ICD-10-CM

## 2025-08-25 DIAGNOSIS — E55.9 VITAMIN D DEFICIENCY: ICD-10-CM

## 2025-08-25 DIAGNOSIS — I25.10 CORONARY ARTERY DISEASE INVOLVING NATIVE CORONARY ARTERY OF NATIVE HEART WITHOUT ANGINA PECTORIS: ICD-10-CM

## 2025-08-25 DIAGNOSIS — M19.90 ARTHRITIS: ICD-10-CM

## 2025-08-25 DIAGNOSIS — M79.18 MYALGIA, MULTIPLE SITES: ICD-10-CM

## 2025-08-25 DIAGNOSIS — I10 PRIMARY HYPERTENSION: Primary | ICD-10-CM

## 2025-08-25 PROBLEM — F10.10 HARMFUL USE OF ALCOHOL: Status: ACTIVE | Noted: 2021-03-24

## 2025-08-25 LAB
25(OH)D3 SERPL-MCNC: 23.2 NG/ML (ref 30–100)
ALBUMIN SERPL-MCNC: 4.3 G/DL (ref 3.5–5.2)
ALBUMIN/GLOB SERPL: 1.3 G/DL
ALP SERPL-CCNC: 68 U/L (ref 39–117)
ALT SERPL W P-5'-P-CCNC: 26 U/L (ref 1–41)
ANION GAP SERPL CALCULATED.3IONS-SCNC: 15.3 MMOL/L (ref 5–15)
AST SERPL-CCNC: 18 U/L (ref 1–40)
BASOPHILS # BLD AUTO: 0.06 10*3/MM3 (ref 0–0.2)
BASOPHILS NFR BLD AUTO: 0.7 % (ref 0–1.5)
BILIRUB SERPL-MCNC: 0.6 MG/DL (ref 0–1.2)
BUN SERPL-MCNC: 12 MG/DL (ref 6–20)
BUN/CREAT SERPL: 14.5 (ref 7–25)
CALCIUM SPEC-SCNC: 9.5 MG/DL (ref 8.6–10.5)
CHLORIDE SERPL-SCNC: 101 MMOL/L (ref 98–107)
CHOLEST SERPL-MCNC: 157 MG/DL (ref 0–200)
CO2 SERPL-SCNC: 20.7 MMOL/L (ref 22–29)
CREAT SERPL-MCNC: 0.83 MG/DL (ref 0.76–1.27)
DEPRECATED RDW RBC AUTO: 45.9 FL (ref 37–54)
EGFRCR SERPLBLD CKD-EPI 2021: 104 ML/MIN/1.73
EOSINOPHIL # BLD AUTO: 0.24 10*3/MM3 (ref 0–0.4)
EOSINOPHIL NFR BLD AUTO: 3 % (ref 0.3–6.2)
ERYTHROCYTE [DISTWIDTH] IN BLOOD BY AUTOMATED COUNT: 12.8 % (ref 12.3–15.4)
GLOBULIN UR ELPH-MCNC: 3.2 GM/DL
GLUCOSE SERPL-MCNC: 105 MG/DL (ref 65–99)
HCT VFR BLD AUTO: 46.6 % (ref 37.5–51)
HDLC SERPL-MCNC: 31 MG/DL (ref 40–60)
HGB BLD-MCNC: 15.4 G/DL (ref 13–17.7)
IMM GRANULOCYTES # BLD AUTO: 0.02 10*3/MM3 (ref 0–0.05)
IMM GRANULOCYTES NFR BLD AUTO: 0.2 % (ref 0–0.5)
LDLC SERPL CALC-MCNC: 99 MG/DL (ref 0–100)
LDLC/HDLC SERPL: 3.07 {RATIO}
LYMPHOCYTES # BLD AUTO: 2.23 10*3/MM3 (ref 0.7–3.1)
LYMPHOCYTES NFR BLD AUTO: 27.7 % (ref 19.6–45.3)
MCH RBC QN AUTO: 32 PG (ref 26.6–33)
MCHC RBC AUTO-ENTMCNC: 33 G/DL (ref 31.5–35.7)
MCV RBC AUTO: 96.9 FL (ref 79–97)
MONOCYTES # BLD AUTO: 0.63 10*3/MM3 (ref 0.1–0.9)
MONOCYTES NFR BLD AUTO: 7.8 % (ref 5–12)
NEUTROPHILS NFR BLD AUTO: 4.86 10*3/MM3 (ref 1.7–7)
NEUTROPHILS NFR BLD AUTO: 60.6 % (ref 42.7–76)
NRBC BLD AUTO-RTO: 0 /100 WBC (ref 0–0.2)
PLATELET # BLD AUTO: 227 10*3/MM3 (ref 140–450)
PMV BLD AUTO: 9 FL (ref 6–12)
POTASSIUM SERPL-SCNC: 4.1 MMOL/L (ref 3.5–5.2)
PROT SERPL-MCNC: 7.5 G/DL (ref 6–8.5)
PSA SERPL-MCNC: 0.68 NG/ML (ref 0–4)
RBC # BLD AUTO: 4.81 10*6/MM3 (ref 4.14–5.8)
SODIUM SERPL-SCNC: 137 MMOL/L (ref 136–145)
TRIGL SERPL-MCNC: 154 MG/DL (ref 0–150)
TSH SERPL DL<=0.05 MIU/L-ACNC: 1.39 UIU/ML (ref 0.27–4.2)
VIT B12 BLD-MCNC: 606 PG/ML (ref 211–946)
VLDLC SERPL-MCNC: 27 MG/DL (ref 5–40)
WBC NRBC COR # BLD AUTO: 8.04 10*3/MM3 (ref 3.4–10.8)

## 2025-08-25 PROCEDURE — 1125F AMNT PAIN NOTED PAIN PRSNT: CPT | Performed by: NURSE PRACTITIONER

## 2025-08-25 PROCEDURE — 99214 OFFICE O/P EST MOD 30 MIN: CPT | Performed by: NURSE PRACTITIONER

## 2025-08-25 PROCEDURE — 82607 VITAMIN B-12: CPT | Performed by: NURSE PRACTITIONER

## 2025-08-25 PROCEDURE — 3079F DIAST BP 80-89 MM HG: CPT | Performed by: NURSE PRACTITIONER

## 2025-08-25 PROCEDURE — 80053 COMPREHEN METABOLIC PANEL: CPT | Performed by: NURSE PRACTITIONER

## 2025-08-25 PROCEDURE — 3074F SYST BP LT 130 MM HG: CPT | Performed by: NURSE PRACTITIONER

## 2025-08-25 PROCEDURE — 36415 COLL VENOUS BLD VENIPUNCTURE: CPT | Performed by: NURSE PRACTITIONER

## 2025-08-25 PROCEDURE — 85025 COMPLETE CBC W/AUTO DIFF WBC: CPT | Performed by: NURSE PRACTITIONER

## 2025-08-25 PROCEDURE — 82306 VITAMIN D 25 HYDROXY: CPT | Performed by: NURSE PRACTITIONER

## 2025-08-25 PROCEDURE — G0103 PSA SCREENING: HCPCS | Performed by: NURSE PRACTITIONER

## 2025-08-25 PROCEDURE — 80061 LIPID PANEL: CPT | Performed by: NURSE PRACTITIONER

## 2025-08-25 PROCEDURE — 84443 ASSAY THYROID STIM HORMONE: CPT | Performed by: NURSE PRACTITIONER

## 2025-08-25 RX ORDER — VITAMIN B COMPLEX
100 TABLET ORAL DAILY
Qty: 90 EACH | Refills: 3 | Status: SHIPPED | OUTPATIENT
Start: 2025-08-25

## 2025-08-25 RX ORDER — PANTOPRAZOLE SODIUM 40 MG/1
40 TABLET, DELAYED RELEASE ORAL DAILY
Qty: 90 TABLET | Refills: 1 | Status: SHIPPED | OUTPATIENT
Start: 2025-08-25

## 2025-08-25 RX ORDER — DULOXETIN HYDROCHLORIDE 30 MG/1
30 CAPSULE, DELAYED RELEASE ORAL DAILY
Qty: 90 CAPSULE | Refills: 1 | Status: SHIPPED | OUTPATIENT
Start: 2025-08-25

## 2025-08-25 RX ORDER — ATORVASTATIN CALCIUM 80 MG/1
80 TABLET, FILM COATED ORAL DAILY
Qty: 90 TABLET | Refills: 1 | Status: SHIPPED | OUTPATIENT
Start: 2025-08-25

## 2025-08-25 RX ORDER — LOSARTAN POTASSIUM AND HYDROCHLOROTHIAZIDE 12.5; 5 MG/1; MG/1
1 TABLET ORAL DAILY
Qty: 90 TABLET | Refills: 1 | Status: SHIPPED | OUTPATIENT
Start: 2025-08-25